# Patient Record
Sex: MALE | Race: WHITE | Employment: OTHER | URBAN - METROPOLITAN AREA
[De-identification: names, ages, dates, MRNs, and addresses within clinical notes are randomized per-mention and may not be internally consistent; named-entity substitution may affect disease eponyms.]

---

## 2021-05-19 ENCOUNTER — OFFICE VISIT (OUTPATIENT)
Dept: ORTHOPEDIC SURGERY | Age: 76
End: 2021-05-19
Payer: MEDICARE

## 2021-05-19 ENCOUNTER — TELEPHONE (OUTPATIENT)
Dept: ORTHOPEDIC SURGERY | Age: 76
End: 2021-05-19

## 2021-05-19 VITALS — WEIGHT: 240 LBS | HEIGHT: 76 IN | BODY MASS INDEX: 29.22 KG/M2

## 2021-05-19 DIAGNOSIS — Z98.890 HISTORY OF LEFT KNEE SURGERY: ICD-10-CM

## 2021-05-19 DIAGNOSIS — M17.32 POST-TRAUMATIC ARTHRITIS OF LEFT LOWER LEG: Primary | ICD-10-CM

## 2021-05-19 PROCEDURE — 99203 OFFICE O/P NEW LOW 30 MIN: CPT | Performed by: ORTHOPAEDIC SURGERY

## 2021-05-19 RX ORDER — MELOXICAM 7.5 MG/1
7.5 TABLET ORAL DAILY
Qty: 30 TABLET | Refills: 1 | Status: SHIPPED | OUTPATIENT
Start: 2021-05-19 | End: 2021-07-12

## 2021-05-19 NOTE — PROGRESS NOTES
CHIEF COMPLAINT: Left knee pain/ posttraumatic osteoarthritis. HISTORY:  Mr. Leandro Armstrong 70 y.o.  male presents today for the first visit for evaluation of left knee pain which started to worsen few months ago and worsening over time gradually. He is complaining of achy pain. Pain is increase with standing and walking and decrease with rest. Pain is sharp early in the morning with first few steps, dull achy pain by the end of the day. Alleviating factors: rest. No radiation and no numbness and tingling sensation. No other complaint. 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     Past Medical History:   Diagnosis Date    Anxiety     CAD (coronary artery disease)     Depression     GERD (gastroesophageal reflux disease)     Hyperlipidemia     Hypertension     Thyroid disease     Wears glasses        Past Surgical History:   Procedure Laterality Date    CARDIAC SURGERY      stent    CHOLECYSTECTOMY  18 years ago    FRACTURE SURGERY Left     KNEE ARTHROSCOPY Right     SHOULDER ARTHROSCOPY Left 15 years ago    TIBIA FRACTURE SURGERY Left 1/26/15    ORIF left tibia nonunion fracture       Social History     Socioeconomic History    Marital status:      Spouse name: Not on file    Number of children: Not on file    Years of education: Not on file    Highest education level: Not on file   Occupational History    Occupation: ret   Tobacco Use    Smoking status: Former Smoker    Smokeless tobacco: Never Used    Tobacco comment: quit:  14 years ago   Substance and Sexual Activity    Alcohol use: Yes     Comment: rare    Drug use: No    Sexual activity: Yes     Partners: Female   Other Topics Concern    Not on file   Social History Narrative    Not on file     Social Determinants of Health     Financial Resource Strain:     Difficulty of Paying Living Expenses:    Food Insecurity:     Worried About Running Out of Food in the Last Year:     Ran Out of Food in the Last Year:    Transportation Needs:     Lack of Transportation (Medical):  Lack of Transportation (Non-Medical):    Physical Activity:     Days of Exercise per Week:     Minutes of Exercise per Session:    Stress:     Feeling of Stress :    Social Connections:     Frequency of Communication with Friends and Family:     Frequency of Social Gatherings with Friends and Family:     Attends Jewish Services:     Active Member of Clubs or Organizations:     Attends Club or Organization Meetings:     Marital Status:    Intimate Partner Violence:     Fear of Current or Ex-Partner:     Emotionally Abused:     Physically Abused:     Sexually Abused:        Family History   Problem Relation Age of Onset    Cancer Mother     Other Father         MVA       Current Outpatient Medications on File Prior to Visit   Medication Sig Dispense Refill    vitamin D (CHOLECALCIFEROL) 1000 UNIT TABS tablet Take 1,000 Units by mouth daily.  LORazepam (ATIVAN) 1 MG tablet Take 0.5 mg by mouth 2 times daily.  buPROPion SR (WELLBUTRIN SR) 150 MG SR tablet Take 150 mg by mouth 2 times daily.  simvastatin (ZOCOR) 40 MG tablet Take 20 mg by mouth nightly.  lisinopril (PRINIVIL;ZESTRIL) 20 MG tablet Take 10 mg by mouth daily.  omeprazole (PRILOSEC) 20 MG capsule Take 20 mg by mouth nightly.  levothyroxine (SYNTHROID) 125 MCG tablet Take 125 mcg by mouth Daily.  busPIRone (BUSPAR) 10 MG tablet Take 15 mg by mouth 2 times daily.  (Patient not taking: Reported on 5/19/2021)       Current Facility-Administered Medications on File Prior to Visit   Medication Dose Route Frequency Provider Last Rate Last Admin    oxyCODONE-acetaminophen (PERCOCET) 5-325 MG per tablet 1 tablet  1 tablet Oral Q4H PRN Nathan Reyes MD           Pertinent items are noted in HPI  Review of systems reviewed from Patient History Form dated on 5/19/2021 and available in the patient's chart under the Media tab. PHYSICAL EXAMINATION:  Mr. Dequan Hilliard is a very pleasant 70 y.o.  male who presents today in no acute distress, awake, alert, and oriented. He is well dressed, nourished and  groomed. Patient with normal affect. Height is  6' 4\" (1.93 m), weight is 240 lb (108.9 kg), Body mass index is 29.21 kg/m². Resting respiratory rate is 16. Examination of the gait, showed that the patient walks heel-toe with a non-antalgic gait and no limp. Examination of both knees showing full ROM, left moderate crepitus, tenderness on medial joint line, stable to varus and valgus stress. He has intact sensation and good pedal pulses. He has good strength in 2 planes, and has mild tenderness on deep palpation over the medial joint line. Knee reflex 1+ bilaterally. There is a large scar on the left lateral knee that is intact. IMAGING:  Xray 3 views of the left knee was obtained today in the office and reviewed. These demonstrate severe degenerative changes with narrowing of the joint space in the medial joint space compartment, subchondral sclerosis, and marginal osteophytosis. There are 2 plates in the tibial fracture. The lateral plate appears to have a crack in the plate. IMPRESSION: Left knee posttraumatic DJD. PLAN: I discussed with the patient the treatment options including both surgical and non-surgical treatment. We recommended Quad exercises and stretching of the calf and hamstrings which was taught to the patient today. He will take NSAIDS as needed intermittently. F/u in 3-4 months, PT if needed. He understands that this may need surgery if the pain did not to resolve.        Juwan Thomas MD

## 2021-05-19 NOTE — TELEPHONE ENCOUNTER
Patient was notified that we will send in meloxicam. Patient states he does not have a reaction to that

## 2021-05-19 NOTE — TELEPHONE ENCOUNTER
General Question     Subject: PATIENT CALLING BACK, CONCERNING PRESCRIPTION  Patient and /or Facility Request: PATIENT  Contact Number: [phone]

## 2021-05-19 NOTE — TELEPHONE ENCOUNTER
Called and left  for patient asking about naproxen. Patient chart states that he is unable to take this due to reacting with hives.

## 2021-05-24 PROBLEM — M17.32 POST-TRAUMATIC ARTHRITIS OF LEFT LOWER LEG: Status: ACTIVE | Noted: 2021-05-24

## 2021-07-12 RX ORDER — MELOXICAM 7.5 MG/1
TABLET ORAL
Qty: 30 TABLET | Refills: 1 | Status: SHIPPED | OUTPATIENT
Start: 2021-07-12 | End: 2022-08-05

## 2022-08-05 ENCOUNTER — OFFICE VISIT (OUTPATIENT)
Dept: ORTHOPEDIC SURGERY | Age: 77
End: 2022-08-05
Payer: MEDICARE

## 2022-08-05 VITALS — BODY MASS INDEX: 29.22 KG/M2 | HEIGHT: 76 IN | WEIGHT: 240 LBS

## 2022-08-05 DIAGNOSIS — M17.32 POST-TRAUMATIC ARTHRITIS OF LEFT LOWER LEG: Primary | ICD-10-CM

## 2022-08-05 PROCEDURE — 99213 OFFICE O/P EST LOW 20 MIN: CPT | Performed by: ORTHOPAEDIC SURGERY

## 2022-08-05 PROCEDURE — 1123F ACP DISCUSS/DSCN MKR DOCD: CPT | Performed by: ORTHOPAEDIC SURGERY

## 2022-08-05 RX ORDER — MELOXICAM 7.5 MG/1
7.5 TABLET ORAL DAILY
Qty: 30 TABLET | Refills: 0 | Status: SHIPPED | OUTPATIENT
Start: 2022-08-05 | End: 2022-09-04

## 2022-08-15 NOTE — PROGRESS NOTES
CHIEF COMPLAINT: Left knee pain/ posttraumatic osteoarthritis. HISTORY:  Mr. Sandhya Reed 78 y.o.  male presents today for f/u evaluation of left knee pain which started to worsen few months ago and worsening over time gradually. He is complaining of achy pain. Pain is increase with standing and walking and decrease with rest. Pain is sharp early in the morning with first few steps, dull achy pain by the end of the day. Alleviating factors: rest. No radiation and no numbness and tingling sensation. No other complaint. 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     Past Medical History:   Diagnosis Date    Anxiety     CAD (coronary artery disease)     Depression     GERD (gastroesophageal reflux disease)     Hyperlipidemia     Hypertension     Thyroid disease     Wears glasses        Past Surgical History:   Procedure Laterality Date    CARDIAC SURGERY      stent    CHOLECYSTECTOMY  18 years ago    FRACTURE SURGERY Left     KNEE ARTHROSCOPY Right     SHOULDER ARTHROSCOPY Left 15 years ago    TIBIA FRACTURE SURGERY Left 1/26/15    ORIF left tibia nonunion fracture       Social History     Socioeconomic History    Marital status:      Spouse name: Not on file    Number of children: Not on file    Years of education: Not on file    Highest education level: Not on file   Occupational History    Occupation: ret   Tobacco Use    Smoking status: Former    Smokeless tobacco: Never    Tobacco comments:     quit:  14 years ago   Substance and Sexual Activity    Alcohol use: Yes     Comment: rare    Drug use: No    Sexual activity: Yes     Partners: Female   Other Topics Concern    Not on file   Social History Narrative    Not on file     Social Determinants of Health     Financial Resource Strain: Not on file   Food Insecurity: Not on file   Transportation Needs: Not on file   Physical Activity: Not on file   Stress: Not on file   Social Connections: Not on file   Intimate Partner Violence: Not on file   Housing Stability: Not on file       Family History   Problem Relation Age of Onset    Cancer Mother     Other Father         MVA       Current Outpatient Medications on File Prior to Visit   Medication Sig Dispense Refill    vitamin D (CHOLECALCIFEROL) 1000 UNIT TABS tablet Take 1,000 Units by mouth daily. LORazepam (ATIVAN) 1 MG tablet Take 0.5 mg by mouth 2 times daily. buPROPion SR (WELLBUTRIN SR) 150 MG SR tablet Take 150 mg by mouth 2 times daily. simvastatin (ZOCOR) 40 MG tablet Take 20 mg by mouth nightly. busPIRone (BUSPAR) 10 MG tablet Take 15 mg by mouth 2 times daily. (Patient not taking: Reported on 5/19/2021)      omeprazole (PRILOSEC) 20 MG capsule Take 20 mg by mouth nightly. levothyroxine (SYNTHROID) 125 MCG tablet Take 125 mcg by mouth Daily. Current Facility-Administered Medications on File Prior to Visit   Medication Dose Route Frequency Provider Last Rate Last Admin    oxyCODONE-acetaminophen (PERCOCET) 5-325 MG per tablet 1 tablet  1 tablet Oral Q4H PRN Nasima Mejia MD           Pertinent items are noted in HPI  Review of systems reviewed from Patient History Form and available in the patient's chart under the Media tab. PHYSICAL EXAMINATION:  Mr. Lila Melendrez is a very pleasant 78 y.o.  male who presents today in no acute distress, awake, alert, and oriented. He is well dressed, nourished and  groomed. Patient with normal affect. Height is  6' 4\" (1.93 m), weight is 240 lb (108.9 kg), Body mass index is 29.21 kg/m². Resting respiratory rate is 16. Examination of the gait, showed that the patient walks heel-toe with a non-antalgic gait and no limp. Examination of both knees showing full ROM, left moderate crepitus, tenderness on medial joint line, stable to varus and valgus stress. He has intact sensation and good pedal pulses.  He has good strength in 2 planes, and has mild tenderness on deep palpation over the medial joint line. Knee reflex 1+ bilaterally. There is a large scar on the left lateral knee that is intact. IMAGING:  Xray 3 views of the left knee was obtained 5/19/2021 in the office and reviewed. These demonstrate severe degenerative changes with narrowing of the joint space in the medial joint space compartment, subchondral sclerosis, and marginal osteophytosis. There are 2 plates in the tibial fracture. The lateral plate appears to have a crack in the plate. IMPRESSION: Left knee posttraumatic DJD. PLAN: I discussed with the patient the treatment options including both surgical and non-surgical treatment. We recommended Quad exercises and stretching of the calf and hamstrings which was taught to the patient today. He will take NSAIDS as needed intermittently. F/u in 3-4 months, PT if needed. He understands that this may need surgery if the pain did not to resolve.  He would like to try Synvisc injectio left knee and we will send for approval.      Ramesh Holt MD

## 2022-08-25 ENCOUNTER — TELEPHONE (OUTPATIENT)
Dept: ORTHOPEDIC SURGERY | Age: 77
End: 2022-08-25

## 2022-08-25 NOTE — TELEPHONE ENCOUNTER
General Question     Subject: Patient call and would like to know what his co pay is going to be he stated it will be 20% he just need to know how much that will be for his visits for his gel injection shots. Please Advise.   Patient Valaria Day  Contact Number: [phone]

## 2022-08-25 NOTE — TELEPHONE ENCOUNTER
Delmis was contacted in regards to his question on his co-pay. He was given the medication and Injection Patient Self-Pay Charge at Time of Service for the following:    Durolane $1375.60  Supartz $218.2  Euflexxa $279.4  Gelsyn 3 $491.2    For the a series 3 injection (Euflexxa and Gelsyn) the patient would pay his normal co-pay for the first office visit and then no co-pay for the additional two. Staff is unsure what the 20% is referrenced to by his insurance. Patient will call insurance and ask if the medication is to be billed at 20% or the injection charge is to be billed at 20%? *ADDENDED: 20% then there is no up front charge   Patient will have to wait until they get the bill from there insurance company. Patient notified.

## 2022-09-07 ENCOUNTER — OFFICE VISIT (OUTPATIENT)
Dept: ORTHOPEDIC SURGERY | Age: 77
End: 2022-09-07
Payer: MEDICARE

## 2022-09-07 VITALS — WEIGHT: 255 LBS | HEIGHT: 76 IN | BODY MASS INDEX: 31.05 KG/M2

## 2022-09-07 DIAGNOSIS — M17.32 POST-TRAUMATIC ARTHRITIS OF LEFT LOWER LEG: Primary | ICD-10-CM

## 2022-09-07 PROCEDURE — 20610 DRAIN/INJ JOINT/BURSA W/O US: CPT | Performed by: ORTHOPAEDIC SURGERY

## 2022-09-07 RX ORDER — HYALURONATE SODIUM 10 MG/ML
20 SYRINGE (ML) INTRAARTICULAR ONCE
Status: COMPLETED | OUTPATIENT
Start: 2022-09-07 | End: 2022-09-07

## 2022-09-07 RX ADMIN — Medication 20 MG: at 11:18

## 2022-09-07 NOTE — PROGRESS NOTES
CHIEF COMPLAINT: Left knee pain/ posttraumatic osteoarthritis. HISTORY:  Mr. Kirby Setting 78 y.o.  male presents today for f/u evaluation of left knee pain which started to worsen few months ago and worsening over time gradually. He is complaining of achy pain 4/10. Pain is increase with standing and walking and decrease with rest. Pain is sharp early in the morning with first few steps, dull achy pain by the end of the day. Alleviating factors: rest. No radiation and no numbness and tingling sensation. No other complaint. 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     Past Medical History:   Diagnosis Date    Anxiety     CAD (coronary artery disease)     Depression     GERD (gastroesophageal reflux disease)     Hyperlipidemia     Hypertension     Thyroid disease     Wears glasses        Past Surgical History:   Procedure Laterality Date    CARDIAC SURGERY      stent    CHOLECYSTECTOMY  18 years ago    FRACTURE SURGERY Left     KNEE ARTHROSCOPY Right     SHOULDER ARTHROSCOPY Left 15 years ago    TIBIA FRACTURE SURGERY Left 1/26/15    ORIF left tibia nonunion fracture       Social History     Socioeconomic History    Marital status:      Spouse name: Not on file    Number of children: Not on file    Years of education: Not on file    Highest education level: Not on file   Occupational History    Occupation: ret   Tobacco Use    Smoking status: Former    Smokeless tobacco: Never    Tobacco comments:     quit:  14 years ago   Substance and Sexual Activity    Alcohol use: Yes     Comment: rare    Drug use: No    Sexual activity: Yes     Partners: Female   Other Topics Concern    Not on file   Social History Narrative    Not on file     Social Determinants of Health     Financial Resource Strain: Not on file   Food Insecurity: Not on file   Transportation Needs: Not on file   Physical Activity: Not on file   Stress: Not on file   Social Connections: Not on file   Intimate Partner Violence: Not on file   Housing Stability: Not on file       Family History   Problem Relation Age of Onset    Cancer Mother     Other Father         MVA       Current Outpatient Medications on File Prior to Visit   Medication Sig Dispense Refill    meloxicam (MOBIC) 7.5 MG tablet Take 1 tablet by mouth in the morning. 30 tablet 0    vitamin D (CHOLECALCIFEROL) 1000 UNIT TABS tablet Take 1,000 Units by mouth daily. LORazepam (ATIVAN) 1 MG tablet Take 0.5 mg by mouth 2 times daily. buPROPion SR (WELLBUTRIN SR) 150 MG SR tablet Take 150 mg by mouth 2 times daily. simvastatin (ZOCOR) 40 MG tablet Take 20 mg by mouth nightly. busPIRone (BUSPAR) 10 MG tablet Take 15 mg by mouth 2 times daily. (Patient not taking: Reported on 5/19/2021)      omeprazole (PRILOSEC) 20 MG capsule Take 20 mg by mouth nightly. levothyroxine (SYNTHROID) 125 MCG tablet Take 125 mcg by mouth Daily. Current Facility-Administered Medications on File Prior to Visit   Medication Dose Route Frequency Provider Last Rate Last Admin    oxyCODONE-acetaminophen (PERCOCET) 5-325 MG per tablet 1 tablet  1 tablet Oral Q4H PRN Kimberley Grubbs MD           Pertinent items are noted in HPI  Review of systems reviewed from Patient History Form and available in the patient's chart under the Media tab. PHYSICAL EXAMINATION:  Mr. Elvia Bower is a very pleasant 78 y.o.  male who presents today in no acute distress, awake, alert, and oriented. He is well dressed, nourished and  groomed. Patient with normal affect. Height is  6' 4\" (1.93 m), weight is 255 lb (115.7 kg), Body mass index is 31.04 kg/m². Resting respiratory rate is 16. Examination of the gait, showed that the patient walks heel-toe with a non-antalgic gait and no limp. Examination of both knees showing full ROM, left moderate crepitus, tenderness on medial joint line, stable to varus and valgus stress.   He has intact sensation and good pedal pulses. He has good strength in 2 planes, and has mild tenderness on deep palpation over the medial joint line. Knee reflex 1+ bilaterally. There is a large scar on the left lateral knee that is intact. IMAGING:  Xray 3 views of the left knee was obtained 5/19/2021 in the office and reviewed. These demonstrate severe degenerative changes with narrowing of the joint space in the medial joint space compartment, subchondral sclerosis, and marginal osteophytosis. There are 2 plates in the tibial fracture. The lateral plate appears to have a crack in the plate. IMPRESSION: Left knee posttraumatic DJD. PLAN: I discussed with the patient the treatment options including both surgical and non-surgical treatment. We recommended continue Quad exercises and stretching of the calf and hamstrings which was taught to the patient today. He will take NSAIDS as needed intermittently. He understands that this may need surgery if the pain did not to resolve. He would like to try Euflexxa injection left knee. PROCEDURE:    With the patient's permission, and under sterile condition, the left knee was prepared and draped with alcohol and injected with 1st Euflexxa through the medial parapatellar port area. The patient tolerated the procedure well. A band-aid was applied and the patient was advised to ice the knee for 15-20 minutes to relieve any injection site related pain. He reported a good improvement immediatly after the injection. F/U next week for 2nd Euflexxa injection.       John Martinez MD

## 2022-09-08 ENCOUNTER — TELEPHONE (OUTPATIENT)
Dept: ORTHOPEDIC SURGERY | Age: 77
End: 2022-09-08

## 2022-09-08 NOTE — TELEPHONE ENCOUNTER
Patient reports some tightness in calf muscle. No Discoloration in skin tone. No pain with squeezing or touch. No redness at injection site. Patient advised that it is common to experience soreness in the muscle group that surrounds the affected joint. Given instruction to stretch with heat to relieve tightness in his calf.

## 2022-09-14 ENCOUNTER — OFFICE VISIT (OUTPATIENT)
Dept: ORTHOPEDIC SURGERY | Age: 77
End: 2022-09-14
Payer: MEDICARE

## 2022-09-14 VITALS — BODY MASS INDEX: 31.05 KG/M2 | HEIGHT: 76 IN | WEIGHT: 255 LBS

## 2022-09-14 DIAGNOSIS — Z98.890 HISTORY OF LEFT KNEE SURGERY: ICD-10-CM

## 2022-09-14 DIAGNOSIS — M17.32 POST-TRAUMATIC ARTHRITIS OF LEFT LOWER LEG: Primary | ICD-10-CM

## 2022-09-14 PROCEDURE — 20610 DRAIN/INJ JOINT/BURSA W/O US: CPT | Performed by: ORTHOPAEDIC SURGERY

## 2022-09-14 RX ORDER — HYALURONATE SODIUM 10 MG/ML
20 SYRINGE (ML) INTRAARTICULAR ONCE
Status: COMPLETED | OUTPATIENT
Start: 2022-09-14 | End: 2022-09-14

## 2022-09-14 RX ADMIN — Medication 20 MG: at 08:30

## 2022-09-18 NOTE — PROGRESS NOTES
CHIEF COMPLAINT: Left knee pain/ posttraumatic osteoarthritis. HISTORY:  Mr. Tigre Gipson 78 y.o.  male presents today for f/u evaluation of left knee pain which started to worsen few months ago and worsening over time gradually. He is complaining of achy pain 4/10. Pain is increase with standing and walking and decrease with rest. Pain is sharp early in the morning with first few steps, dull achy pain by the end of the day. Alleviating factors: rest. No radiation and no numbness and tingling sensation. No other complaint. 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     Past Medical History:   Diagnosis Date    Anxiety     CAD (coronary artery disease)     Depression     GERD (gastroesophageal reflux disease)     Hyperlipidemia     Hypertension     Thyroid disease     Wears glasses        Past Surgical History:   Procedure Laterality Date    CARDIAC SURGERY      stent    CHOLECYSTECTOMY  18 years ago    FRACTURE SURGERY Left     KNEE ARTHROSCOPY Right     SHOULDER ARTHROSCOPY Left 15 years ago    TIBIA FRACTURE SURGERY Left 1/26/15    ORIF left tibia nonunion fracture       Social History     Socioeconomic History    Marital status:      Spouse name: Not on file    Number of children: Not on file    Years of education: Not on file    Highest education level: Not on file   Occupational History    Occupation: ret   Tobacco Use    Smoking status: Former    Smokeless tobacco: Never    Tobacco comments:     quit:  14 years ago   Substance and Sexual Activity    Alcohol use: Yes     Comment: rare    Drug use: No    Sexual activity: Yes     Partners: Female   Other Topics Concern    Not on file   Social History Narrative    Not on file     Social Determinants of Health     Financial Resource Strain: Not on file   Food Insecurity: Not on file   Transportation Needs: Not on file   Physical Activity: Not on file   Stress: Not on file   Social Connections: Not on file   Intimate Partner Violence: Not on file   Housing Stability: Not on file       Family History   Problem Relation Age of Onset    Cancer Mother     Other Father         MVA       Current Outpatient Medications on File Prior to Visit   Medication Sig Dispense Refill    meloxicam (MOBIC) 7.5 MG tablet Take 1 tablet by mouth in the morning. 30 tablet 0    vitamin D (CHOLECALCIFEROL) 1000 UNIT TABS tablet Take 1,000 Units by mouth daily. LORazepam (ATIVAN) 1 MG tablet Take 0.5 mg by mouth 2 times daily. buPROPion SR (WELLBUTRIN SR) 150 MG SR tablet Take 150 mg by mouth 2 times daily. simvastatin (ZOCOR) 40 MG tablet Take 20 mg by mouth nightly. busPIRone (BUSPAR) 10 MG tablet Take 15 mg by mouth 2 times daily. (Patient not taking: Reported on 5/19/2021)      omeprazole (PRILOSEC) 20 MG capsule Take 20 mg by mouth nightly. levothyroxine (SYNTHROID) 125 MCG tablet Take 125 mcg by mouth Daily. Current Facility-Administered Medications on File Prior to Visit   Medication Dose Route Frequency Provider Last Rate Last Admin    oxyCODONE-acetaminophen (PERCOCET) 5-325 MG per tablet 1 tablet  1 tablet Oral Q4H PRN Cleve Stafford MD           Pertinent items are noted in HPI  Review of systems reviewed from Patient History Form and available in the patient's chart under the Media tab. PHYSICAL EXAMINATION:  Mr. Parmjit Kovacs is a very pleasant 78 y.o.  male who presents today in no acute distress, awake, alert, and oriented. He is well dressed, nourished and  groomed. Patient with normal affect. Height is  6' 4\" (1.93 m), weight is 255 lb (115.7 kg), Body mass index is 31.04 kg/m². Resting respiratory rate is 16. Examination of the gait, showed that the patient walks heel-toe with a non-antalgic gait and no limp. Examination of both knees showing full ROM, left moderate crepitus, tenderness on medial joint line, stable to varus and valgus stress.   He has intact

## 2022-09-21 ENCOUNTER — OFFICE VISIT (OUTPATIENT)
Dept: ORTHOPEDIC SURGERY | Age: 77
End: 2022-09-21
Payer: MEDICARE

## 2022-09-21 VITALS — WEIGHT: 255 LBS | HEIGHT: 76 IN | BODY MASS INDEX: 31.05 KG/M2

## 2022-09-21 DIAGNOSIS — M17.32 POST-TRAUMATIC ARTHRITIS OF LEFT LOWER LEG: Primary | ICD-10-CM

## 2022-09-21 PROCEDURE — 20610 DRAIN/INJ JOINT/BURSA W/O US: CPT | Performed by: ORTHOPAEDIC SURGERY

## 2022-09-21 RX ORDER — HYALURONATE SODIUM 10 MG/ML
20 SYRINGE (ML) INTRAARTICULAR ONCE
Status: COMPLETED | OUTPATIENT
Start: 2022-09-21 | End: 2022-09-21

## 2022-09-21 RX ADMIN — Medication 20 MG: at 09:01

## 2022-10-03 NOTE — PROGRESS NOTES
CHIEF COMPLAINT: Left knee pain/ posttraumatic osteoarthritis. HISTORY:  Mr. Vanda Gaona 78 y.o.  male presents today for f/u evaluation of left knee pain which started to worsen few months ago and worsening over time gradually. He is complaining of achy pain 5/10. Pain is increase with standing and walking and decrease with rest. Pain is sharp early in the morning with first few steps, dull achy pain by the end of the day. Alleviating factors: rest. No radiation and no numbness and tingling sensation. No other complaint. 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     Past Medical History:   Diagnosis Date    Anxiety     CAD (coronary artery disease)     Depression     GERD (gastroesophageal reflux disease)     Hyperlipidemia     Hypertension     Thyroid disease     Wears glasses        Past Surgical History:   Procedure Laterality Date    CARDIAC SURGERY      stent    CHOLECYSTECTOMY  18 years ago    FRACTURE SURGERY Left     KNEE ARTHROSCOPY Right     SHOULDER ARTHROSCOPY Left 15 years ago    TIBIA FRACTURE SURGERY Left 1/26/15    ORIF left tibia nonunion fracture       Social History     Socioeconomic History    Marital status:      Spouse name: Not on file    Number of children: Not on file    Years of education: Not on file    Highest education level: Not on file   Occupational History    Occupation: ret   Tobacco Use    Smoking status: Former    Smokeless tobacco: Never    Tobacco comments:     quit:  14 years ago   Substance and Sexual Activity    Alcohol use: Yes     Comment: rare    Drug use: No    Sexual activity: Yes     Partners: Female   Other Topics Concern    Not on file   Social History Narrative    Not on file     Social Determinants of Health     Financial Resource Strain: Not on file   Food Insecurity: Not on file   Transportation Needs: Not on file   Physical Activity: Not on file   Stress: Not on file   Social Connections: Not on file   Intimate Partner Violence: Not on file   Housing Stability: Not on file       Family History   Problem Relation Age of Onset    Cancer Mother     Other Father         MVA       Current Outpatient Medications on File Prior to Visit   Medication Sig Dispense Refill    meloxicam (MOBIC) 7.5 MG tablet Take 1 tablet by mouth in the morning. 30 tablet 0    vitamin D (CHOLECALCIFEROL) 1000 UNIT TABS tablet Take 1,000 Units by mouth daily. LORazepam (ATIVAN) 1 MG tablet Take 0.5 mg by mouth 2 times daily. buPROPion SR (WELLBUTRIN SR) 150 MG SR tablet Take 150 mg by mouth 2 times daily. simvastatin (ZOCOR) 40 MG tablet Take 20 mg by mouth nightly. busPIRone (BUSPAR) 10 MG tablet Take 15 mg by mouth 2 times daily. (Patient not taking: Reported on 5/19/2021)      omeprazole (PRILOSEC) 20 MG capsule Take 20 mg by mouth nightly. levothyroxine (SYNTHROID) 125 MCG tablet Take 125 mcg by mouth Daily. Current Facility-Administered Medications on File Prior to Visit   Medication Dose Route Frequency Provider Last Rate Last Admin    oxyCODONE-acetaminophen (PERCOCET) 5-325 MG per tablet 1 tablet  1 tablet Oral Q4H PRN Benito Reis MD           Pertinent items are noted in HPI  Review of systems reviewed from Patient History Form and available in the patient's chart under the Media tab. PHYSICAL EXAMINATION:  Mr. Tye Issa is a very pleasant 78 y.o.  male who presents today in no acute distress, awake, alert, and oriented. He is well dressed, nourished and  groomed. Patient with normal affect. Height is  6' 4\" (1.93 m), weight is 255 lb (115.7 kg), Body mass index is 31.04 kg/m². Resting respiratory rate is 16. Examination of the gait, showed that the patient walks heel-toe with a non-antalgic gait and no limp. Examination of both knees showing full ROM, left moderate crepitus, tenderness on medial joint line, stable to varus and valgus stress.   He has intact sensation and good pedal pulses. He has good strength in 2 planes, and has mild tenderness on deep palpation over the medial joint line. Knee reflex 1+ bilaterally. There is a large scar on the left lateral knee that is intact. IMAGING:  Xray 3 views of the left knee was obtained 5/19/2021 in the office and reviewed. These demonstrate severe degenerative changes with narrowing of the joint space in the medial joint space compartment, subchondral sclerosis, and marginal osteophytosis. There are 2 plates in the tibial fracture. The lateral plate appears to have a crack in the plate. IMPRESSION: Left knee posttraumatic DJD. PLAN: I discussed with the patient the treatment options including both surgical and non-surgical treatment. We recommended continue Quad exercises and stretching of the calf and hamstrings which was taught to the patient today. He will take NSAIDS as needed intermittently. He understands that this may need surgery if the pain did not to resolve. He would like to try Euflexxa injection left knee. PROCEDURE:    With the patient's permission, and under sterile condition, the left knee was prepared and draped with alcohol and injected with 3rd Euflexxa through the medial parapatellar port area. The patient tolerated the procedure well. A band-aid was applied and the patient was advised to ice the knee for 15-20 minutes to relieve any injection site related pain. He reported a good improvement immediatly after the injection. F/U in 3-4 months.       Kim Martinez MD

## 2023-03-01 ENCOUNTER — TELEPHONE (OUTPATIENT)
Dept: ORTHOPEDIC SURGERY | Age: 78
End: 2023-03-01

## 2023-03-08 ENCOUNTER — OFFICE VISIT (OUTPATIENT)
Dept: ORTHOPEDIC SURGERY | Age: 78
End: 2023-03-08

## 2023-03-08 VITALS — BODY MASS INDEX: 31.05 KG/M2 | WEIGHT: 255 LBS | HEIGHT: 76 IN

## 2023-03-08 DIAGNOSIS — M17.32 POST-TRAUMATIC ARTHRITIS OF LEFT LOWER LEG: ICD-10-CM

## 2023-03-08 DIAGNOSIS — Z98.890 HISTORY OF LEFT KNEE SURGERY: ICD-10-CM

## 2023-03-08 DIAGNOSIS — S82.252K CLOSED DISPLACED COMMINUTED FRACTURE OF SHAFT OF LEFT TIBIA WITH NONUNION: ICD-10-CM

## 2023-03-08 DIAGNOSIS — M17.32 POST-TRAUMATIC OSTEOARTHRITIS OF LEFT KNEE: Primary | ICD-10-CM

## 2023-03-08 RX ORDER — LIDOCAINE HYDROCHLORIDE 10 MG/ML
40 INJECTION, SOLUTION INFILTRATION; PERINEURAL ONCE
Status: COMPLETED | OUTPATIENT
Start: 2023-03-08 | End: 2023-03-08

## 2023-03-08 RX ORDER — TRIAMCINOLONE ACETONIDE 40 MG/ML
40 INJECTION, SUSPENSION INTRA-ARTICULAR; INTRAMUSCULAR ONCE
Status: COMPLETED | OUTPATIENT
Start: 2023-03-08 | End: 2023-03-08

## 2023-03-08 RX ADMIN — LIDOCAINE HYDROCHLORIDE 40 MG: 10 INJECTION, SOLUTION INFILTRATION; PERINEURAL at 10:29

## 2023-03-08 RX ADMIN — TRIAMCINOLONE ACETONIDE 40 MG: 40 INJECTION, SUSPENSION INTRA-ARTICULAR; INTRAMUSCULAR at 10:29

## 2023-03-08 NOTE — PROGRESS NOTES
CHIEF COMPLAINT:   1- Left knee pain/ posttraumatic osteoarthritis. 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 HISTORY:  Mr. Penny Louis 78 y.o.  male presents today for f/u evaluation of left knee pain which started to worsen summer 2022 and worsening over time gradually. He had left knee Eufflexxa injection on 9/21/2023 with good improvement. He is complaining of achy pain 6/10. Pain is increase with standing and walking and decrease with rest. Pain is sharp early in the morning with first few steps, dull achy pain by the end of the day. Alleviating factors: rest. No radiation and no numbness and tingling sensation. No other complaint. 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     Past Medical History:   Diagnosis Date    Anxiety     CAD (coronary artery disease)     Depression     GERD (gastroesophageal reflux disease)     Hyperlipidemia     Hypertension     Thyroid disease     Wears glasses        Past Surgical History:   Procedure Laterality Date    CARDIAC SURGERY      stent    CHOLECYSTECTOMY  18 years ago    FRACTURE SURGERY Left     KNEE ARTHROSCOPY Right     SHOULDER ARTHROSCOPY Left 15 years ago    TIBIA FRACTURE SURGERY Left 1/26/15    ORIF left tibia nonunion fracture       Social History     Socioeconomic History    Marital status:      Spouse name: Not on file    Number of children: Not on file    Years of education: Not on file    Highest education level: Not on file   Occupational History    Occupation: ret   Tobacco Use    Smoking status: Former    Smokeless tobacco: Never    Tobacco comments:     quit:  14 years ago   Substance and Sexual Activity    Alcohol use: Yes     Comment: rare    Drug use: No    Sexual activity: Yes     Partners: Female   Other Topics Concern    Not on file   Social History Narrative    Not on file     Social Determinants of Health     Financial Resource Strain: Not on file   Food Insecurity: Not on file   Transportation Needs: Not on file   Physical Activity: Not on file   Stress: Not on file   Social Connections: Not on file   Intimate Partner Violence: Not on file   Housing Stability: Not on file       Family History   Problem Relation Age of Onset    Cancer Mother     Other Father         MVA       Current Outpatient Medications on File Prior to Visit   Medication Sig Dispense Refill    meloxicam (MOBIC) 7.5 MG tablet Take 1 tablet by mouth in the morning. 30 tablet 0    vitamin D (CHOLECALCIFEROL) 1000 UNIT TABS tablet Take 1,000 Units by mouth daily. LORazepam (ATIVAN) 1 MG tablet Take 0.5 mg by mouth 2 times daily. buPROPion SR (WELLBUTRIN SR) 150 MG SR tablet Take 150 mg by mouth 2 times daily. simvastatin (ZOCOR) 40 MG tablet Take 20 mg by mouth nightly. busPIRone (BUSPAR) 10 MG tablet Take 15 mg by mouth 2 times daily. (Patient not taking: Reported on 5/19/2021)      omeprazole (PRILOSEC) 20 MG capsule Take 20 mg by mouth nightly. levothyroxine (SYNTHROID) 125 MCG tablet Take 125 mcg by mouth Daily. Current Facility-Administered Medications on File Prior to Visit   Medication Dose Route Frequency Provider Last Rate Last Admin    oxyCODONE-acetaminophen (PERCOCET) 5-325 MG per tablet 1 tablet  1 tablet Oral Q4H PRN Mary Dallas MD           Pertinent items are noted in HPI  Review of systems reviewed from Patient History Form and available in the patient's chart under the Media tab. PHYSICAL EXAMINATION:  Mr. Wilhelmenia Runner is a very pleasant 78 y.o.  male who presents today in no acute distress, awake, alert, and oriented. He is well dressed, nourished and  groomed. Patient with normal affect. Height is  6' 4\" (1.93 m), weight is 255 lb (115.7 kg), Body mass index is 31.04 kg/m². Resting respiratory rate is 16.      Examination of the gait, showed that the patient walks heel-toe with a non-antalgic gait and no limp.  Examination of both knees showing full ROM, left moderate crepitus, tenderness on medial joint line, stable to varus and valgus stress. He has intact sensation and good pedal pulses. He has good strength in 2 planes, and has mild tenderness on deep palpation over the medial joint line. Knee reflex 1+ bilaterally. There is a large scar on the left lateral knee that is intact. No tenderness over the tibia fracture nonunion site. IMAGING:  Xray 3 views of the left knee was obtained today in the office and reviewed. These demonstrate severe degenerative changes with narrowing of the joint space in the medial joint space compartment, subchondral sclerosis, and marginal osteophytosis. There are 2 plates in the tibial fracture. The anterior plate has a crack in the plate but fracture healed. IMPRESSION: Left knee posttraumatic DJD. 1- Left knee pain/ posttraumatic osteoarthritis. 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 PLAN: I discussed with the patient the treatment options including both surgical and non-surgical treatment. We recommended continue Quad exercises and stretching of the calf and hamstrings which was taught to the patient today. He will take NSAIDS as needed intermittently. He understands that this may need surgery if the pain did not to resolve. I believe he will benefit from cortisone injection left knee, and he agreed to have. PROCEDURE:    With the patient's permission, and under sterile condition, the left knee was prepared and draped with alcohol and injected with a mixture of 1 mL Kenalog 40mg and 4 mL of 1% lidocaine through the medial parapatellar port area. The patient tolerated the procedure well. A band-aid was applied and the patient was advised to ice the knee for 15-20 minutes to relieve any injection site related pain. He reported a good improvement immediatly after the injection.  We will sent for Eufflexxa approval.    F/U in 3-4 months.       Lina Morgan MD

## 2023-03-14 DIAGNOSIS — M17.12 PRIMARY OSTEOARTHRITIS OF LEFT KNEE: Primary | ICD-10-CM

## 2023-03-20 ENCOUNTER — TELEPHONE (OUTPATIENT)
Dept: ORTHOPEDIC SURGERY | Age: 78
End: 2023-03-20

## 2023-03-20 NOTE — TELEPHONE ENCOUNTER
Called and left patient a voicemail message saying his Euflexxa injections are approved 3/22 - 5/21. Asked him to callback to schedule 3 appointments at his convenience.

## 2023-03-29 ENCOUNTER — OFFICE VISIT (OUTPATIENT)
Dept: ORTHOPEDIC SURGERY | Age: 78
End: 2023-03-29
Payer: MEDICARE

## 2023-03-29 VITALS — WEIGHT: 255 LBS | HEIGHT: 76 IN | BODY MASS INDEX: 31.05 KG/M2

## 2023-03-29 DIAGNOSIS — M17.32 POST-TRAUMATIC OSTEOARTHRITIS OF LEFT KNEE: Primary | ICD-10-CM

## 2023-03-29 DIAGNOSIS — S82.252K CLOSED DISPLACED COMMINUTED FRACTURE OF SHAFT OF LEFT TIBIA WITH NONUNION: ICD-10-CM

## 2023-03-29 PROCEDURE — 99213 OFFICE O/P EST LOW 20 MIN: CPT | Performed by: ORTHOPAEDIC SURGERY

## 2023-03-29 PROCEDURE — 20610 DRAIN/INJ JOINT/BURSA W/O US: CPT | Performed by: ORTHOPAEDIC SURGERY

## 2023-03-29 PROCEDURE — 1123F ACP DISCUSS/DSCN MKR DOCD: CPT | Performed by: ORTHOPAEDIC SURGERY

## 2023-03-29 RX ORDER — HYALURONATE SODIUM 10 MG/ML
20 SYRINGE (ML) INTRAARTICULAR ONCE
Status: COMPLETED | OUTPATIENT
Start: 2023-03-29 | End: 2023-03-29

## 2023-03-29 RX ADMIN — Medication 20 MG: at 09:26

## 2023-03-29 NOTE — PROGRESS NOTES
CHIEF COMPLAINT:   1- Left knee pain/ posttraumatic osteoarthritis. 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 HISTORY:  Mr. Vyas Nephew 78 y.o.  male presents today for f/u evaluation of left knee pain which started to worsen summer 2022 and worsening over time gradually. He had left knee cortisone injection on 3/8/2023 with good improvement. He had left knee Eufflexxa injection on 9/21/2023 with good improvement. He is complaining of achy pain 4/10. Pain is increase with standing and walking and decrease with rest. Pain is sharp early in the morning with first few steps, dull achy pain by the end of the day. Alleviating factors: rest. No radiation and no numbness and tingling sensation. No other complaint. 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     Past Medical History:   Diagnosis Date    Anxiety     CAD (coronary artery disease)     Depression     GERD (gastroesophageal reflux disease)     Hyperlipidemia     Hypertension     Thyroid disease     Wears glasses        Past Surgical History:   Procedure Laterality Date    CARDIAC SURGERY      stent    CHOLECYSTECTOMY  18 years ago    FRACTURE SURGERY Left     KNEE ARTHROSCOPY Right     SHOULDER ARTHROSCOPY Left 15 years ago    TIBIA FRACTURE SURGERY Left 1/26/15    ORIF left tibia nonunion fracture       Social History     Socioeconomic History    Marital status:      Spouse name: Not on file    Number of children: Not on file    Years of education: Not on file    Highest education level: Not on file   Occupational History    Occupation: ret   Tobacco Use    Smoking status: Former    Smokeless tobacco: Never    Tobacco comments:     quit:  14 years ago   Substance and Sexual Activity    Alcohol use: Yes     Comment: rare    Drug use: No    Sexual activity: Yes     Partners: Female   Other Topics Concern    Not on file   Social History Narrative    Not

## 2023-04-05 ENCOUNTER — OFFICE VISIT (OUTPATIENT)
Dept: ORTHOPEDIC SURGERY | Age: 78
End: 2023-04-05

## 2023-04-05 VITALS — WEIGHT: 255 LBS | BODY MASS INDEX: 31.05 KG/M2 | HEIGHT: 76 IN

## 2023-04-05 DIAGNOSIS — S82.252K CLOSED DISPLACED COMMINUTED FRACTURE OF SHAFT OF LEFT TIBIA WITH NONUNION: ICD-10-CM

## 2023-04-05 DIAGNOSIS — M17.32 POST-TRAUMATIC OSTEOARTHRITIS OF LEFT KNEE: Primary | ICD-10-CM

## 2023-04-05 RX ORDER — HYALURONATE SODIUM 10 MG/ML
20 SYRINGE (ML) INTRAARTICULAR ONCE
Status: COMPLETED | OUTPATIENT
Start: 2023-04-05 | End: 2023-04-05

## 2023-04-05 RX ADMIN — Medication 20 MG: at 09:40

## 2023-04-05 NOTE — PROGRESS NOTES
CHIEF COMPLAINT:   1- Left knee pain/ posttraumatic osteoarthritis. 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 HISTORY:  Mr. Norma Patel 78 y.o.  male presents today for f/u evaluation of left knee pain which started to worsen summer 2022 and worsening over time gradually. He had left knee cortisone injection on 3/8/2023 with good improvement. He had left knee Eufflexxa injection on 9/21/2023 with good improvement. He reported no to minimal achy pain 0/10. Pain is increase with standing and walking and decrease with rest. Pain is sharp early in the morning with first few steps, dull achy pain by the end of the day. Alleviating factors: rest. No radiation and no numbness and tingling sensation. No other complaint. 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     Past Medical History:   Diagnosis Date    Anxiety     CAD (coronary artery disease)     Depression     GERD (gastroesophageal reflux disease)     Hyperlipidemia     Hypertension     Thyroid disease     Wears glasses        Past Surgical History:   Procedure Laterality Date    CARDIAC SURGERY      stent    CHOLECYSTECTOMY  18 years ago    FRACTURE SURGERY Left     KNEE ARTHROSCOPY Right     SHOULDER ARTHROSCOPY Left 15 years ago    TIBIA FRACTURE SURGERY Left 1/26/15    ORIF left tibia nonunion fracture       Social History     Socioeconomic History    Marital status:      Spouse name: Not on file    Number of children: Not on file    Years of education: Not on file    Highest education level: Not on file   Occupational History    Occupation: ret   Tobacco Use    Smoking status: Former    Smokeless tobacco: Never    Tobacco comments:     quit:  14 years ago   Substance and Sexual Activity    Alcohol use: Yes     Comment: rare    Drug use: No    Sexual activity: Yes     Partners: Female   Other Topics Concern    Not on file   Social History Narrative

## 2023-07-02 SDOH — HEALTH STABILITY: PHYSICAL HEALTH: ON AVERAGE, HOW MANY DAYS PER WEEK DO YOU ENGAGE IN MODERATE TO STRENUOUS EXERCISE (LIKE A BRISK WALK)?: 0 DAYS

## 2023-07-05 ENCOUNTER — OFFICE VISIT (OUTPATIENT)
Dept: ORTHOPEDIC SURGERY | Age: 78
End: 2023-07-05
Payer: MEDICARE

## 2023-07-05 VITALS — HEIGHT: 76 IN | WEIGHT: 255 LBS | BODY MASS INDEX: 31.05 KG/M2

## 2023-07-05 DIAGNOSIS — M17.32 POST-TRAUMATIC OSTEOARTHRITIS OF LEFT KNEE: Primary | ICD-10-CM

## 2023-07-05 DIAGNOSIS — S82.252K CLOSED DISPLACED COMMINUTED FRACTURE OF SHAFT OF LEFT TIBIA WITH NONUNION: ICD-10-CM

## 2023-07-05 PROCEDURE — 20610 DRAIN/INJ JOINT/BURSA W/O US: CPT | Performed by: ORTHOPAEDIC SURGERY

## 2023-07-05 PROCEDURE — 99213 OFFICE O/P EST LOW 20 MIN: CPT | Performed by: ORTHOPAEDIC SURGERY

## 2023-07-05 PROCEDURE — 1123F ACP DISCUSS/DSCN MKR DOCD: CPT | Performed by: ORTHOPAEDIC SURGERY

## 2023-07-05 RX ORDER — TRIAMCINOLONE ACETONIDE 40 MG/ML
40 INJECTION, SUSPENSION INTRA-ARTICULAR; INTRAMUSCULAR ONCE
Status: COMPLETED | OUTPATIENT
Start: 2023-07-05 | End: 2023-07-05

## 2023-07-05 RX ORDER — LIDOCAINE HYDROCHLORIDE 10 MG/ML
40 INJECTION, SOLUTION INFILTRATION; PERINEURAL ONCE
Status: COMPLETED | OUTPATIENT
Start: 2023-07-05 | End: 2023-07-05

## 2023-07-05 RX ADMIN — TRIAMCINOLONE ACETONIDE 40 MG: 40 INJECTION, SUSPENSION INTRA-ARTICULAR; INTRAMUSCULAR at 15:16

## 2023-07-05 RX ADMIN — LIDOCAINE HYDROCHLORIDE 40 MG: 10 INJECTION, SOLUTION INFILTRATION; PERINEURAL at 15:15

## 2023-07-07 NOTE — PROGRESS NOTES
CHIEF COMPLAINT:   1- Left knee pain/ posttraumatic osteoarthritis. 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 HISTORY:  Mr. Nicole Toledo 78 y.o.  male presents today for f/u evaluation of left knee pain which started to worsen summer 2022 and worsening over time gradually. He had left knee cortisone injection on 4/12/2023 with good improvement. He had left knee Eufflexxa injection on 9/21/2022 with good improvement. He reported no to minimal achy pain 0/10. Pain is increase with standing and walking and decrease with rest. Pain is sharp early in the morning with first few steps, dull achy pain by the end of the day. Alleviating factors: rest. No radiation and no numbness and tingling sensation. No other complaint. 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     Past Medical History:   Diagnosis Date    Anxiety     CAD (coronary artery disease)     Depression     GERD (gastroesophageal reflux disease)     Hyperlipidemia     Hypertension     Thyroid disease     Wears glasses        Past Surgical History:   Procedure Laterality Date    CARDIAC SURGERY      stent    CHOLECYSTECTOMY  18 years ago    FRACTURE SURGERY Left     KNEE ARTHROSCOPY Right     SHOULDER ARTHROSCOPY Left 15 years ago    TIBIA FRACTURE SURGERY Left 1/26/15    ORIF left tibia nonunion fracture       Social History     Socioeconomic History    Marital status:      Spouse name: Not on file    Number of children: Not on file    Years of education: Not on file    Highest education level: Not on file   Occupational History    Occupation: ret   Tobacco Use    Smoking status: Former    Smokeless tobacco: Never    Tobacco comments:     quit:  14 years ago   Substance and Sexual Activity    Alcohol use: Yes     Comment: rare    Drug use: No    Sexual activity: Yes     Partners: Female   Other Topics Concern    Not on file   Social History Narrative

## 2023-11-22 ENCOUNTER — OFFICE VISIT (OUTPATIENT)
Dept: ORTHOPEDIC SURGERY | Age: 78
End: 2023-11-22

## 2023-11-22 VITALS — HEIGHT: 76 IN | BODY MASS INDEX: 31.05 KG/M2 | WEIGHT: 255 LBS

## 2023-11-22 DIAGNOSIS — M17.32 POST-TRAUMATIC OSTEOARTHRITIS OF LEFT KNEE: Primary | ICD-10-CM

## 2023-11-22 DIAGNOSIS — M17.32 POST-TRAUMATIC ARTHRITIS OF LEFT LOWER LEG: ICD-10-CM

## 2023-11-22 DIAGNOSIS — S82.252K CLOSED DISPLACED COMMINUTED FRACTURE OF SHAFT OF LEFT TIBIA WITH NONUNION: ICD-10-CM

## 2023-11-22 RX ORDER — TRIAMCINOLONE ACETONIDE 40 MG/ML
40 INJECTION, SUSPENSION INTRA-ARTICULAR; INTRAMUSCULAR ONCE
Status: COMPLETED | OUTPATIENT
Start: 2023-11-22 | End: 2023-11-22

## 2023-11-22 RX ORDER — LIDOCAINE HYDROCHLORIDE 10 MG/ML
4 INJECTION, SOLUTION INFILTRATION; PERINEURAL ONCE
Status: COMPLETED | OUTPATIENT
Start: 2023-11-22 | End: 2023-11-22

## 2023-11-22 RX ADMIN — LIDOCAINE HYDROCHLORIDE 4 ML: 10 INJECTION, SOLUTION INFILTRATION; PERINEURAL at 15:43

## 2023-11-22 RX ADMIN — TRIAMCINOLONE ACETONIDE 40 MG: 40 INJECTION, SUSPENSION INTRA-ARTICULAR; INTRAMUSCULAR at 15:44

## 2023-11-27 NOTE — PROGRESS NOTES
CHIEF COMPLAINT:   1- Left knee pain/ posttraumatic osteoarthritis. 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 HISTORY:  Mr. Rodolfo Howard 70 y.o.  male presents today for f/u evaluation of left knee pain which started to worsen summer 2022 and worsening over time gradually. He had left knee cortisone injection on 4/12/2023 with good improvement. He had left knee cortisone injection on 7/5/2023 with good improvement. He reported no to minimal achy pain 7/10. Pain is increase with standing and walking and decrease with rest. Pain is sharp early in the morning with first few steps, dull achy pain by the end of the day. Alleviating factors: rest. No radiation and no numbness and tingling sensation. No other complaint. 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     Past Medical History:   Diagnosis Date    Anxiety     CAD (coronary artery disease)     Depression     GERD (gastroesophageal reflux disease)     Hyperlipidemia     Hypertension     Thyroid disease     Wears glasses        Past Surgical History:   Procedure Laterality Date    CARDIAC SURGERY      stent    CHOLECYSTECTOMY  18 years ago    FRACTURE SURGERY Left     KNEE ARTHROSCOPY Right     SHOULDER ARTHROSCOPY Left 15 years ago    TIBIA FRACTURE SURGERY Left 1/26/15    ORIF left tibia nonunion fracture       Social History     Socioeconomic History    Marital status:      Spouse name: Not on file    Number of children: Not on file    Years of education: Not on file    Highest education level: Not on file   Occupational History    Occupation: ret   Tobacco Use    Smoking status: Former    Smokeless tobacco: Never    Tobacco comments:     quit:  14 years ago   Substance and Sexual Activity    Alcohol use: Yes     Comment: rare    Drug use: No    Sexual activity: Yes     Partners: Female   Other Topics Concern    Not on file   Social History Narrative

## 2024-02-09 ENCOUNTER — TELEPHONE (OUTPATIENT)
Dept: ORTHOPEDIC SURGERY | Age: 79
End: 2024-02-09

## 2024-02-09 NOTE — TELEPHONE ENCOUNTER
Patient was scheduled for 2/14/2024 at the [facility], [address] [place] [address]., [address], [place] [address].

## 2024-02-09 NOTE — TELEPHONE ENCOUNTER
General Question     Subject: EUFLEXXA - PLEASE GET PA   Patient and /or Facility Request: Mark Josue \"Mina\"  Contact Number: [phone]     Pt WOULD LIKE TO GET EUFLEXXA GEL INJECTIONS FOR L  KNEE AND IS READY FOR THE PA AND FOR THEM TO BE ORDERED.     PLEASE CALL WHEN OK TO SCHEDULE.

## 2024-02-14 ENCOUNTER — OFFICE VISIT (OUTPATIENT)
Dept: ORTHOPEDIC SURGERY | Age: 79
End: 2024-02-14
Payer: MEDICARE

## 2024-02-14 DIAGNOSIS — M17.32 POST-TRAUMATIC OSTEOARTHRITIS OF LEFT KNEE: Primary | ICD-10-CM

## 2024-02-14 PROCEDURE — 20610 DRAIN/INJ JOINT/BURSA W/O US: CPT | Performed by: ORTHOPAEDIC SURGERY

## 2024-02-14 PROCEDURE — 1123F ACP DISCUSS/DSCN MKR DOCD: CPT | Performed by: ORTHOPAEDIC SURGERY

## 2024-02-14 PROCEDURE — 99213 OFFICE O/P EST LOW 20 MIN: CPT | Performed by: ORTHOPAEDIC SURGERY

## 2024-02-14 RX ORDER — LIDOCAINE HYDROCHLORIDE 10 MG/ML
4 INJECTION, SOLUTION INFILTRATION; PERINEURAL ONCE
Status: COMPLETED | OUTPATIENT
Start: 2024-02-14 | End: 2024-02-14

## 2024-02-14 RX ORDER — TRIAMCINOLONE ACETONIDE 40 MG/ML
40 INJECTION, SUSPENSION INTRA-ARTICULAR; INTRAMUSCULAR ONCE
Status: COMPLETED | OUTPATIENT
Start: 2024-02-14 | End: 2024-02-14

## 2024-02-14 RX ADMIN — TRIAMCINOLONE ACETONIDE 40 MG: 40 INJECTION, SUSPENSION INTRA-ARTICULAR; INTRAMUSCULAR at 08:54

## 2024-02-14 RX ADMIN — LIDOCAINE HYDROCHLORIDE 4 ML: 10 INJECTION, SOLUTION INFILTRATION; PERINEURAL at 08:53

## 2024-02-14 NOTE — PROGRESS NOTES
CHIEF COMPLAINT:   1- Left knee pain/ posttraumatic osteoarthritis.  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    HISTORY:  Mr. Josue 78 y.o.  male presents today for f/u evaluation of left knee pain which started to worsen summer 2022 and worsening over time gradually. He had left knee cortisone injection on 11/22/2023 with good improvement. He had left knee Euflexxa injection on 7/5/2023 with good improvement.  He reported no to minimal achy pain 7/10.  Pain is increase with standing and walking and decrease with rest. Pain is sharp early in the morning with first few steps, dull achy pain by the end of the day. Alleviating factors: rest. No radiation and no numbness and tingling sensation. No other complaint.  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    Past Medical History:   Diagnosis Date    Anxiety     CAD (coronary artery disease)     Depression     GERD (gastroesophageal reflux disease)     Hyperlipidemia     Hypertension     Thyroid disease     Wears glasses        Past Surgical History:   Procedure Laterality Date    CARDIAC SURGERY      stent    CHOLECYSTECTOMY  18 years ago    FRACTURE SURGERY Left     KNEE ARTHROSCOPY Right     SHOULDER ARTHROSCOPY Left 15 years ago    TIBIA FRACTURE SURGERY Left 1/26/15    ORIF left tibia nonunion fracture       Social History     Socioeconomic History    Marital status:      Spouse name: Not on file    Number of children: Not on file    Years of education: Not on file    Highest education level: Not on file   Occupational History    Occupation: ret   Tobacco Use    Smoking status: Former    Smokeless tobacco: Never    Tobacco comments:     quit:  14 years ago   Substance and Sexual Activity    Alcohol use: Yes     Comment: rare    Drug use: No    Sexual activity: Yes     Partners: Female   Other Topics Concern    Not on file   Social History Narrative

## 2024-02-19 ENCOUNTER — TELEPHONE (OUTPATIENT)
Dept: ORTHOPEDIC SURGERY | Age: 79
End: 2024-02-19

## 2024-02-19 NOTE — TELEPHONE ENCOUNTER
Augustus Noe MA  P [facility] Schedule Staff  EUFLEXXA  (SERIES OF 3) LEFT KNEE    APPROVED FOR BUY & BILL  APPROVED # [id number].   DATES:  02/14/2024 - 08/12/2024.  PER FAX FROM [facility]. SAVED TO [employer].    [facility] stating that his injections were approved and to call [phone] to schedule his appointments.     Patient will need to schedule 1 injection per week for 3 consecutive weeks.

## 2024-02-28 ENCOUNTER — OFFICE VISIT (OUTPATIENT)
Dept: ORTHOPEDIC SURGERY | Age: 79
End: 2024-02-28
Payer: MEDICARE

## 2024-02-28 VITALS — BODY MASS INDEX: 31.05 KG/M2 | WEIGHT: 255 LBS | HEIGHT: 76 IN

## 2024-02-28 DIAGNOSIS — M17.32 POST-TRAUMATIC OSTEOARTHRITIS OF LEFT KNEE: Primary | ICD-10-CM

## 2024-02-28 DIAGNOSIS — S82.252K CLOSED DISPLACED COMMINUTED FRACTURE OF SHAFT OF LEFT TIBIA WITH NONUNION: ICD-10-CM

## 2024-02-28 PROCEDURE — 1123F ACP DISCUSS/DSCN MKR DOCD: CPT | Performed by: ORTHOPAEDIC SURGERY

## 2024-02-28 PROCEDURE — 99213 OFFICE O/P EST LOW 20 MIN: CPT | Performed by: ORTHOPAEDIC SURGERY

## 2024-02-28 PROCEDURE — 20610 DRAIN/INJ JOINT/BURSA W/O US: CPT | Performed by: ORTHOPAEDIC SURGERY

## 2024-02-28 RX ORDER — HYALURONATE SODIUM 10 MG/ML
20 SYRINGE (ML) INTRAARTICULAR ONCE
Status: COMPLETED | OUTPATIENT
Start: 2024-02-28 | End: 2024-02-28

## 2024-02-28 RX ADMIN — Medication 20 MG: at 10:41

## 2024-02-28 NOTE — PROGRESS NOTES
CHIEF COMPLAINT:   1- Left knee pain/ posttraumatic osteoarthritis.  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    HISTORY:  Mr. Josue 78 y.o.  male presents today for f/u evaluation of left knee pain which started to worsen summer 2022 and worsening over time gradually. He had left knee cortisone injection on 2/14/2024 with good improvement. He had left knee Euflexxa injection on 7/5/2023 with good improvement.  He reported no to minimal achy pain 5/10.  Pain is increase with standing and walking and decrease with rest. Pain is sharp early in the morning with first few steps, dull achy pain by the end of the day. Alleviating factors: rest. No radiation and no numbness and tingling sensation. No other complaint.  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    Past Medical History:   Diagnosis Date    Anxiety     CAD (coronary artery disease)     Depression     GERD (gastroesophageal reflux disease)     Hyperlipidemia     Hypertension     Thyroid disease     Wears glasses        Past Surgical History:   Procedure Laterality Date    CARDIAC SURGERY      stent    CHOLECYSTECTOMY  18 years ago    FRACTURE SURGERY Left     KNEE ARTHROSCOPY Right     SHOULDER ARTHROSCOPY Left 15 years ago    TIBIA FRACTURE SURGERY Left 1/26/15    ORIF left tibia nonunion fracture       Social History     Socioeconomic History    Marital status:      Spouse name: Not on file    Number of children: Not on file    Years of education: Not on file    Highest education level: Not on file   Occupational History    Occupation: ret   Tobacco Use    Smoking status: Former    Smokeless tobacco: Never    Tobacco comments:     quit:  14 years ago   Substance and Sexual Activity    Alcohol use: Yes     Comment: rare    Drug use: No    Sexual activity: Yes     Partners: Female   Other Topics Concern    Not on file   Social History Narrative

## 2024-03-06 ENCOUNTER — OFFICE VISIT (OUTPATIENT)
Dept: ORTHOPEDIC SURGERY | Age: 79
End: 2024-03-06
Payer: MEDICARE

## 2024-03-06 VITALS — BODY MASS INDEX: 31.05 KG/M2 | HEIGHT: 76 IN | WEIGHT: 255 LBS

## 2024-03-06 DIAGNOSIS — M17.32 POST-TRAUMATIC ARTHRITIS OF LEFT LOWER LEG: ICD-10-CM

## 2024-03-06 DIAGNOSIS — M17.32 POST-TRAUMATIC OSTEOARTHRITIS OF LEFT KNEE: Primary | ICD-10-CM

## 2024-03-06 DIAGNOSIS — S82.252K CLOSED DISPLACED COMMINUTED FRACTURE OF SHAFT OF LEFT TIBIA WITH NONUNION: ICD-10-CM

## 2024-03-06 PROCEDURE — 1123F ACP DISCUSS/DSCN MKR DOCD: CPT | Performed by: ORTHOPAEDIC SURGERY

## 2024-03-06 PROCEDURE — 99213 OFFICE O/P EST LOW 20 MIN: CPT | Performed by: ORTHOPAEDIC SURGERY

## 2024-03-06 PROCEDURE — 20610 DRAIN/INJ JOINT/BURSA W/O US: CPT | Performed by: ORTHOPAEDIC SURGERY

## 2024-03-06 RX ORDER — HYALURONATE SODIUM 10 MG/ML
20 SYRINGE (ML) INTRAARTICULAR ONCE
Status: COMPLETED | OUTPATIENT
Start: 2024-03-06 | End: 2024-03-06

## 2024-03-06 RX ADMIN — Medication 20 MG: at 13:18

## 2024-03-06 NOTE — PROGRESS NOTES
Not on file     Social Determinants of Health     Financial Resource Strain: Not on file   Food Insecurity: Not on file   Transportation Needs: Not on file   Physical Activity: Unknown (7/2/2023)    Exercise Vital Sign     Days of Exercise per Week: 0 days     Minutes of Exercise per Session: Not on file   Stress: Not on file   Social Connections: Not on file   Intimate Partner Violence: Not At Risk (7/2/2023)    Humiliation, Afraid, Rape, and Kick questionnaire     Fear of Current or Ex-Partner: No     Emotionally Abused: No     Physically Abused: No     Sexually Abused: No   Housing Stability: Not on file       Family History   Problem Relation Age of Onset    Cancer Mother     Other Father         MVA       Current Outpatient Medications on File Prior to Visit   Medication Sig Dispense Refill    meloxicam (MOBIC) 7.5 MG tablet Take 1 tablet by mouth in the morning. 30 tablet 0    vitamin D (CHOLECALCIFEROL) 1000 UNIT TABS tablet Take 1,000 Units by mouth daily.      LORazepam (ATIVAN) 1 MG tablet Take 0.5 mg by mouth 2 times daily.      buPROPion SR (WELLBUTRIN SR) 150 MG SR tablet Take 150 mg by mouth 2 times daily.      simvastatin (ZOCOR) 40 MG tablet Take 20 mg by mouth nightly.      busPIRone (BUSPAR) 10 MG tablet Take 15 mg by mouth 2 times daily. (Patient not taking: Reported on 5/19/2021)      omeprazole (PRILOSEC) 20 MG capsule Take 20 mg by mouth nightly.      levothyroxine (SYNTHROID) 125 MCG tablet Take 125 mcg by mouth Daily.       Current Facility-Administered Medications on File Prior to Visit   Medication Dose Route Frequency Provider Last Rate Last Admin    oxyCODONE-acetaminophen (PERCOCET) 5-325 MG per tablet 1 tablet  1 tablet Oral Q4H PRN Mark Mccall MD           Pertinent items are noted in HPI  Review of systems reviewed from Patient History Form and available in the patient's chart under the Media tab.        PHYSICAL EXAMINATION:  Mr. Josue is a very pleasant 78 y.o.

## 2024-03-13 ENCOUNTER — OFFICE VISIT (OUTPATIENT)
Dept: ORTHOPEDIC SURGERY | Age: 79
End: 2024-03-13

## 2024-03-13 VITALS — WEIGHT: 255.07 LBS | BODY MASS INDEX: 31.06 KG/M2 | HEIGHT: 76 IN

## 2024-03-13 DIAGNOSIS — M17.32 POST-TRAUMATIC ARTHRITIS OF LEFT LOWER LEG: ICD-10-CM

## 2024-03-13 DIAGNOSIS — S82.252K CLOSED DISPLACED COMMINUTED FRACTURE OF SHAFT OF LEFT TIBIA WITH NONUNION: ICD-10-CM

## 2024-03-13 DIAGNOSIS — M17.32 POST-TRAUMATIC OSTEOARTHRITIS OF LEFT KNEE: Primary | ICD-10-CM

## 2024-03-13 RX ORDER — HYALURONATE SODIUM 10 MG/ML
20 SYRINGE (ML) INTRAARTICULAR ONCE
Status: COMPLETED | OUTPATIENT
Start: 2024-03-13 | End: 2024-03-13

## 2024-03-13 RX ADMIN — Medication 20 MG: at 10:28

## 2024-03-13 NOTE — PROGRESS NOTES
condition, the left knee was prepared and draped with alcohol and injected with 3rd Eufflexxa through the medial parapatellar port area.  The patient tolerated the procedure well.   A band-aid was applied and the patient was advised to ice the knee for 15-20 minutes to relieve any injection site related pain.    He reported a good improvement immediatly after the injection.     F/U in 3-4 months, PT if indicated.         Parag Britton MD

## 2024-03-27 SDOH — HEALTH STABILITY: PHYSICAL HEALTH: ON AVERAGE, HOW MANY DAYS PER WEEK DO YOU ENGAGE IN MODERATE TO STRENUOUS EXERCISE (LIKE A BRISK WALK)?: 0 DAYS

## 2024-03-27 SDOH — HEALTH STABILITY: PHYSICAL HEALTH: ON AVERAGE, HOW MANY MINUTES DO YOU ENGAGE IN EXERCISE AT THIS LEVEL?: 0 MIN

## 2024-03-29 ENCOUNTER — OFFICE VISIT (OUTPATIENT)
Dept: ORTHOPEDIC SURGERY | Age: 79
End: 2024-03-29
Payer: MEDICARE

## 2024-03-29 VITALS — WEIGHT: 255 LBS | HEIGHT: 75 IN | BODY MASS INDEX: 31.71 KG/M2

## 2024-03-29 DIAGNOSIS — M17.32 POST-TRAUMATIC OSTEOARTHRITIS OF LEFT KNEE: Primary | ICD-10-CM

## 2024-03-29 PROCEDURE — 1123F ACP DISCUSS/DSCN MKR DOCD: CPT | Performed by: ORTHOPAEDIC SURGERY

## 2024-03-29 PROCEDURE — 99213 OFFICE O/P EST LOW 20 MIN: CPT | Performed by: ORTHOPAEDIC SURGERY

## 2024-03-29 NOTE — PROGRESS NOTES
[facility]  Office Visit    Chief Complaint: Left knee pain    HPI:  Mark Josue is a 78 y.o. who is here for evaluation of left knee pain.  He is seen in and been treated by Dr. Britton for this issue for multiple years.  His history is significant for left proximal tibia fracture that he sustained about 40 years ago.  He subsequently had the hardware removed after that surgery.  However, he again fractured his left proximal tibia in November 2014.  This was initially treated in a cast nonoperatively for approximately 2 months at  before he underwent open reduction internal fixation in February 2015 with Dr. Britton.  He did go on to heal this fracture.  He now reports worsening left knee pain.  He has been treated in the past with steroid and Euflexxa injections.  However, he continues to have pain on a daily basis that is worse with weightbearing activities including steps. The patient has difficulty climbing stairs, difficulty getting out of a chair, difficulty with activities of daily living including hygiene and pain at night.  Pain level at rest is 7 and with activities 9-10/10.     He does have congestive heart failure and takes aspirin.  He denies diabetes, history of blood clots, tobacco use.  He has help at home.  He walks with use of a cane.  He also wears a knee brace.      Past Medical History:   Diagnosis Date    Anxiety     CAD (coronary artery disease)     Depression     GERD (gastroesophageal reflux disease)     Hyperlipidemia     Hypertension     Thyroid disease     Wears glasses         ROS:  Constitutional: denies fever, chills, weight loss  MSK: denies pain in other joints, muscle aches  Neurological: denies numbness, tingling, weakness    Exam:  Ht 1.905 m (6' 3\")   Wt 115.7 kg (255 lb)   BMI 31.87 kg/m²      Appearance: sitting in exam room chair, appears to be in no acute distress, awake and alert  Resp: unlabored breathing on room air  Skin: warm, dry and

## 2024-04-03 ENCOUNTER — TELEPHONE (OUTPATIENT)
Dept: ORTHOPEDIC SURGERY | Age: 79
End: 2024-04-03

## 2024-04-03 NOTE — TELEPHONE ENCOUNTER
Milena from call center called, patient called in to see if he stop by tomorrow (Thursday) and get fitted for a brace?  I asked Alison about it, and she did not see anything in the notes, and told me to send a message.      Please call patient [phone], thank you!

## 2024-04-04 ENCOUNTER — TELEPHONE (OUTPATIENT)
Dept: ORTHOPEDIC SURGERY | Age: 79
End: 2024-04-04

## 2024-04-04 DIAGNOSIS — M17.32 POST-TRAUMATIC OSTEOARTHRITIS OF LEFT KNEE: Primary | ICD-10-CM

## 2024-04-05 ENCOUNTER — PREP FOR PROCEDURE (OUTPATIENT)
Dept: ORTHOPEDIC SURGERY | Age: 79
End: 2024-04-05

## 2024-04-05 PROBLEM — M17.12 OSTEOARTHRITIS OF LEFT KNEE: Status: ACTIVE | Noted: 2024-04-05

## 2024-04-08 ENCOUNTER — APPOINTMENT (OUTPATIENT)
Dept: PHYSICAL THERAPY | Age: 79
End: 2024-04-08
Payer: MEDICARE

## 2024-04-09 ENCOUNTER — HOSPITAL ENCOUNTER (OUTPATIENT)
Dept: PHYSICAL THERAPY | Age: 79
Setting detail: THERAPIES SERIES
Discharge: HOME | End: 2024-04-09
Payer: MEDICARE

## 2024-04-09 DIAGNOSIS — M17.12 LOCALIZED OSTEOARTHRITIS OF LEFT KNEE: Primary | ICD-10-CM

## 2024-04-09 PROCEDURE — 97530 THERAPEUTIC ACTIVITIES: CPT

## 2024-04-09 PROCEDURE — 97161 PT EVAL LOW COMPLEX 20 MIN: CPT

## 2024-04-09 PROCEDURE — 97112 NEUROMUSCULAR REEDUCATION: CPT

## 2024-04-09 NOTE — PLAN OF CARE
functional ROM, Decreased core/proximal hip strength and neuromuscular control, Decreased LE functional strength , and Reduced balance/proprioceptive control    Functional Activity Limitations (from functional questionnaire and intake):  Reduced ability to tolerate prolonged functional positions  Reduced ability or difficulty with changes of positions or transfers between positions  Reduced ability to maintain good posture and demonstrate good body mechanics with sitting, bending, and lifting  Reduced ability to sleep  Reduced ability to perform lifting, reaching, carrying tasks  Reduced ability to squat  Reduced ability to ambulate prolonged functional periods/distances/surfaces  Reduced ability to ascend/descend stairs    Participation Restrictions:   Reduced participation in social activities    Classification :   Signs/symptoms consistent with knee OA/hip OA    Barriers to/and or personal factors that will affect rehab potential:   Co-morbidities    Physical Therapy Evaluation Complexity Justification  [x] A history of present problem and 1-2 personal factors and/or co-morbidities that impact the plan of care  [x] A total of 3 elements found upon examination of body systems using standardized tests and measures addressing any of the following: body structures, functions (impairments), activity limitations, and/or participation restrictions  [x] A clinical presentation with stable and/or uncomplicated characteristics   [x] Clinical decision making of LOW (45395 - Typically 20 minutes face-to-face) complexity using standardized patient assessment instrument and/or measurable assessment of functional outcome.    Today's Assessment: See above    Medical Necessity Documentation:  I certify that this patient meets the below criteria necessary for medical necessity for care and/or justification of therapy services:  The patient has functional impairments and/or activity limitations and would benefit from continued

## 2024-05-20 ENCOUNTER — OFFICE VISIT (OUTPATIENT)
Dept: ORTHOPEDIC SURGERY | Age: 79
End: 2024-05-20
Payer: OTHER GOVERNMENT

## 2024-05-20 VITALS — HEIGHT: 75 IN | BODY MASS INDEX: 31.71 KG/M2 | WEIGHT: 255 LBS

## 2024-05-20 DIAGNOSIS — M17.32 POST-TRAUMATIC OSTEOARTHRITIS OF LEFT KNEE: Primary | ICD-10-CM

## 2024-05-20 DIAGNOSIS — M17.11 ARTHRITIS OF RIGHT KNEE: ICD-10-CM

## 2024-05-20 PROCEDURE — 99213 OFFICE O/P EST LOW 20 MIN: CPT | Performed by: PHYSICIAN ASSISTANT

## 2024-05-20 PROCEDURE — 1123F ACP DISCUSS/DSCN MKR DOCD: CPT | Performed by: PHYSICIAN ASSISTANT

## 2024-05-20 PROCEDURE — 20610 DRAIN/INJ JOINT/BURSA W/O US: CPT | Performed by: PHYSICIAN ASSISTANT

## 2024-05-20 RX ORDER — BUPIVACAINE HYDROCHLORIDE 2.5 MG/ML
2 INJECTION, SOLUTION INFILTRATION; PERINEURAL ONCE
Status: COMPLETED | OUTPATIENT
Start: 2024-05-20 | End: 2024-05-20

## 2024-05-20 RX ORDER — TRIAMCINOLONE ACETONIDE 40 MG/ML
40 INJECTION, SUSPENSION INTRA-ARTICULAR; INTRAMUSCULAR ONCE
Status: COMPLETED | OUTPATIENT
Start: 2024-05-20 | End: 2024-05-20

## 2024-05-20 RX ADMIN — BUPIVACAINE HYDROCHLORIDE 5 MG: 2.5 INJECTION, SOLUTION INFILTRATION; PERINEURAL at 10:53

## 2024-05-20 RX ADMIN — TRIAMCINOLONE ACETONIDE 40 MG: 40 INJECTION, SUSPENSION INTRA-ARTICULAR; INTRAMUSCULAR at 10:55

## 2024-05-20 RX ADMIN — TRIAMCINOLONE ACETONIDE 40 MG: 40 INJECTION, SUSPENSION INTRA-ARTICULAR; INTRAMUSCULAR at 10:54

## 2024-05-20 NOTE — PROGRESS NOTES
This dictation was done with [email or web address] dictation and may contain mechanical errors related to translation.  Height 1.905 m (6' 3\"), weight 115.7 kg (255 lb).    This is a very pleasant 78-year-old gentleman who has bilateral knee osteoarthritis he is actually on the schedule for more than 3 months to have his left knee replaced.  Both knees are causing some soreness and pain he is getting ready to do some preconditioning physical therapy prior to the surgery so we talked about short and long-term expectations and we decided to do injections of cortisone in both of his knees today.    This patient is here in follow-up for ongoing pain and dysfunction in their knees. There x-rays done previously showed joint space narrowing subchondral sclerosis with osteophyte formation. They currently have had relief with injections of cortisone, anti-inflammatories and a home exercise program. There are here with increased pain over the last few days with swelling and dysfunction.  They noticed that there was some increasing pain and and swelling and disability over the last 7 to 10 days especially.  This increase led to them making an appointment.    On examination this is a well-developed patient in no acute distress. They are alert and oriented ×3. They walk without antalgia or any significant varus or valgus deformity. They have crepitus during flexion and extension in the patellofemoral joint. They have a negative Lachman and a negative Abdulaziz. There are good distal pulses and good dorsiflexion and plantarflexion strength present    My impression is bilateral knee osteoarthritis    After a discussion of the multiple options, they consented to a cortisone shot. 1 ml of 40mg/ml Kenalog and 2 ml's of 0.25%Marcaine were injected into both the right and the left knee joint spaces.  The leg was slightly flexed and injected just lateral to the patella tendon to under the patella. This was done with sterile technique and they tolerated

## 2024-07-08 ENCOUNTER — HOSPITAL ENCOUNTER (OUTPATIENT)
Dept: CT IMAGING | Age: 79
Discharge: HOME | End: 2024-07-08
Attending: ORTHOPAEDIC SURGERY
Payer: MEDICARE

## 2024-07-08 DIAGNOSIS — M17.32 POST-TRAUMATIC OSTEOARTHRITIS OF LEFT KNEE: ICD-10-CM

## 2024-07-08 PROCEDURE — 73700 CT LOWER EXTREMITY W/O DYE: CPT

## 2024-07-15 ENCOUNTER — TELEPHONE (OUTPATIENT)
Dept: ORTHOPEDIC SURGERY | Age: 79
End: 2024-07-15

## 2024-07-15 NOTE — TELEPHONE ENCOUNTER
Pt has [employer] product and they wont pay for the CT.   Apparently he had it done already on 7-8.  He said they called him to schedule it.   Now Seun wants a P2P because they dont think it is necessary.   How do we take care of this so he doesn't get a bill.

## 2024-08-07 ENCOUNTER — TELEPHONE (OUTPATIENT)
Dept: ORTHOPEDIC SURGERY | Age: 79
End: 2024-08-07

## 2024-08-07 NOTE — TELEPHONE ENCOUNTER
Pt wife works 7-8 hrs on the weekends every week.   Please call him regarding getting home healthcare for those hours she is gone.    [phone]

## 2024-08-12 ENCOUNTER — HOSPITAL ENCOUNTER (OUTPATIENT)
Dept: PREADMISSION TESTING | Age: 79
Discharge: HOME | End: 2024-08-16
Payer: MEDICARE

## 2024-08-12 ENCOUNTER — OFFICE VISIT (OUTPATIENT)
Dept: ORTHOPEDIC SURGERY | Age: 79
End: 2024-08-12
Payer: MEDICARE

## 2024-08-12 VITALS — HEIGHT: 75 IN | WEIGHT: 255 LBS | BODY MASS INDEX: 31.71 KG/M2

## 2024-08-12 DIAGNOSIS — M17.32 POST-TRAUMATIC OSTEOARTHRITIS OF LEFT KNEE: Primary | ICD-10-CM

## 2024-08-12 DIAGNOSIS — M17.32 POST-TRAUMATIC OSTEOARTHRITIS OF LEFT KNEE: ICD-10-CM

## 2024-08-12 LAB
25(OH)D3 SERPL-MCNC: 17.6 NG/ML
ABO + RH BLD: NORMAL
ALBUMIN SERPL-MCNC: 3.9 G/DL (ref 3.4–5)
ANION GAP SERPL CALCULATED.3IONS-SCNC: 15 MMOL/L (ref 3–16)
APTT BLD: 31.3 SEC (ref 22.1–36.4)
BASOPHILS # BLD: 0.1 K/UL (ref 0–0.2)
BASOPHILS NFR BLD: 0.7 %
BLD GP AB SCN SERPL QL: NORMAL
BUN SERPL-MCNC: 16 MG/DL (ref 7–20)
CALCIUM SERPL-MCNC: 8.8 MG/DL (ref 8.3–10.6)
CHLORIDE SERPL-SCNC: 104 MMOL/L (ref 99–110)
CO2 SERPL-SCNC: 22 MMOL/L (ref 21–32)
CREAT SERPL-MCNC: 1.3 MG/DL (ref 0.8–1.3)
DEPRECATED RDW RBC AUTO: 15.1 % (ref 12.4–15.4)
EOSINOPHIL # BLD: 0.1 K/UL (ref 0–0.6)
EOSINOPHIL NFR BLD: 1.7 %
EST. AVERAGE GLUCOSE BLD GHB EST-MCNC: 114 MG/DL
GFR SERPLBLD CREATININE-BSD FMLA CKD-EPI: 57 ML/MIN/{1.73_M2}
GLUCOSE SERPL-MCNC: 94 MG/DL (ref 70–99)
HBA1C MFR BLD: 5.6 %
HCT VFR BLD AUTO: 45.8 % (ref 40.5–52.5)
HGB BLD-MCNC: 15.9 G/DL (ref 13.5–17.5)
INR PPP: 1.15 (ref 0.85–1.15)
LYMPHOCYTES # BLD: 1.5 K/UL (ref 1–5.1)
LYMPHOCYTES NFR BLD: 17.2 %
MCH RBC QN AUTO: 33.8 PG (ref 26–34)
MCHC RBC AUTO-ENTMCNC: 34.7 G/DL (ref 31–36)
MCV RBC AUTO: 97.3 FL (ref 80–100)
MONOCYTES # BLD: 1 K/UL (ref 0–1.3)
MONOCYTES NFR BLD: 11.7 %
MRSA DNA SPEC QL NAA+PROBE: NORMAL
NEUTROPHILS # BLD: 6 K/UL (ref 1.7–7.7)
NEUTROPHILS NFR BLD: 68.7 %
PLATELET # BLD AUTO: 222 K/UL (ref 135–450)
PMV BLD AUTO: 8.9 FL (ref 5–10.5)
POTASSIUM SERPL-SCNC: 3.8 MMOL/L (ref 3.5–5.1)
PREALB SERPL-MCNC: 30.1 MG/DL (ref 20–40)
PROTHROMBIN TIME: 14.9 SEC (ref 11.9–14.9)
RBC # BLD AUTO: 4.7 M/UL (ref 4.2–5.9)
SODIUM SERPL-SCNC: 141 MMOL/L (ref 136–145)
WBC # BLD AUTO: 8.7 K/UL (ref 4–11)

## 2024-08-12 PROCEDURE — 86900 BLOOD TYPING SEROLOGIC ABO: CPT

## 2024-08-12 PROCEDURE — 83036 HEMOGLOBIN GLYCOSYLATED A1C: CPT

## 2024-08-12 PROCEDURE — 80048 BASIC METABOLIC PNL TOTAL CA: CPT

## 2024-08-12 PROCEDURE — 86901 BLOOD TYPING SEROLOGIC RH(D): CPT

## 2024-08-12 PROCEDURE — 85610 PROTHROMBIN TIME: CPT

## 2024-08-12 PROCEDURE — 86850 RBC ANTIBODY SCREEN: CPT

## 2024-08-12 PROCEDURE — 84134 ASSAY OF PREALBUMIN: CPT

## 2024-08-12 PROCEDURE — 82306 VITAMIN D 25 HYDROXY: CPT

## 2024-08-12 PROCEDURE — 85730 THROMBOPLASTIN TIME PARTIAL: CPT

## 2024-08-12 PROCEDURE — 82040 ASSAY OF SERUM ALBUMIN: CPT

## 2024-08-12 PROCEDURE — 99214 OFFICE O/P EST MOD 30 MIN: CPT | Performed by: ORTHOPAEDIC SURGERY

## 2024-08-12 PROCEDURE — 87641 MR-STAPH DNA AMP PROBE: CPT

## 2024-08-12 PROCEDURE — 85025 COMPLETE CBC W/AUTO DIFF WBC: CPT

## 2024-08-12 PROCEDURE — MISCCOLD COLD THERAPY UNIT AND PAD: Performed by: ORTHOPAEDIC SURGERY

## 2024-08-12 PROCEDURE — 1123F ACP DISCUSS/DSCN MKR DOCD: CPT | Performed by: ORTHOPAEDIC SURGERY

## 2024-08-12 RX ORDER — CARVEDILOL 25 MG/1
12.5 TABLET ORAL 2 TIMES DAILY WITH MEALS
COMMUNITY
Start: 2024-02-13

## 2024-08-12 RX ORDER — TADALAFIL 5 MG/1
5 TABLET ORAL DAILY
COMMUNITY

## 2024-08-12 RX ORDER — FLUTICASONE PROPIONATE 50 MCG
1 SPRAY, SUSPENSION (ML) NASAL EVERY EVENING
COMMUNITY
Start: 2024-02-13

## 2024-08-12 RX ORDER — ASPIRIN 81 MG/1
81 TABLET, CHEWABLE ORAL NIGHTLY
COMMUNITY

## 2024-08-12 RX ORDER — BUSPIRONE HYDROCHLORIDE 15 MG/1
15 TABLET ORAL 2 TIMES DAILY
COMMUNITY
End: 2024-08-13

## 2024-08-12 RX ORDER — DAPAGLIFLOZIN 10 MG/1
10 TABLET, FILM COATED ORAL DAILY
COMMUNITY
Start: 2024-04-14

## 2024-08-12 NOTE — PROGRESS NOTES
[facility]  Office Visit    Chief Complaint: Left knee pain    HPI:  Mark Josue is a 78 y.o. who is here in follow-up of left knee pain.  He is scheduled for left total knee arthroplasty next week.  For review, he has been seen and treated by Dr. Britton for this issue for multiple years.  His history is significant for left proximal tibia fracture that he sustained about 40 years ago.  He subsequently had the hardware removed after that surgery.  However, he again fractured his left proximal tibia in November 2014.  This was initially treated in a cast nonoperatively for approximately 2 months at  before he underwent open reduction internal fixation in February 2015 with Dr. Britton.  He did go on to heal this fracture.  He now reports worsening left knee pain.  He has been treated in the past with steroid and Euflexxa injections.  However, he continues to have pain on a daily basis that is worse with weightbearing activities including steps. The patient has difficulty climbing stairs, difficulty getting out of a chair, difficulty with activities of daily living including hygiene and pain at night.  Pain level at rest is 7 and with activities 9-10/10.     He does have congestive heart failure and takes aspirin.  He denies diabetes, history of blood clots, tobacco use.  He has help at home.  He walks with use of a cane.  He also wears a knee brace.      Past Medical History:   Diagnosis Date    Anxiety     CAD (coronary artery disease)     Depression     GERD (gastroesophageal reflux disease)     Hyperlipidemia     Hypertension     Thyroid disease     Wears glasses         ROS:  Constitutional: denies fever, chills, weight loss  MSK: denies pain in other joints, muscle aches  Neurological: denies numbness, tingling, weakness    Exam:  Ht 1.905 m (6' 3\")   Wt 115.7 kg (255 lb)   BMI 31.87 kg/m²      Appearance: sitting in exam room chair, appears to be in no acute distress, awake

## 2024-08-12 NOTE — PROGRESS NOTES
Patient  attended JET class on 8/12/2024 . Patient verified surgery for Total Knee replacement. Patient  received patient information and educational JET folder including the following handouts: jet powerpoint, ERAS, incentive spirometry including purpose and how to perform, case management contact information, hand hygiene, preventing constipation, choices for home health care agency list, pre-operative showering techniques and the use of anti-septic 3 days before surgery.  Interviews completed by PT, OT, and Nurse Navigator.  Labs and Tests to be completed/completed as ordered/necessary by outpatient lab if not already completed.  Anti-septic bottle given to patient to take home. Patient  states no further questions or concerns. Patient provided with orthopedic office, after hours clinic, case management, and nurse navigator contact information.    Date Of Surgery: 8/20/2024  Surgeon: Dr Deng  Per Patient Will see/Saw Primary care provider on 8/14/2024.     Will see/Saw Specialist Cardiology  on 7/12/2024.    HISTORY OF JOINT REPLACEMENT(S): No history of joint replacement    DC Plan: Home    HOME ASSISTANCE - WHO WILL BE STAYING WITH YOU AT HOME FOR FIRST SEVERAL DAYS? spouse Aria    DC TRANSPORTATION: spouse Aria    STEPS INTO HOME: through back 5; through front 3    STEPS TO BATHROOM/BEDROOM: none - one level    DME NEEDS:  Needs a rolling walker, agreeable to hospital durable medical equipment representative and company. Already has a cane and a shower chair.    LENGTH OF STAY HAS BEEN DISCUSSED WITH THE PATIENT, APPROPRIATE TO HIS/ HER PROCEDURE.  PATIENT HAS BEEN INFORMED THAT THEY WILL BE DISCHARGED WHEN THE PHYSICIAN DEEMS THEM MEDICALLY READY. MOST PATIENTS CAN EXPECT TO BE IN THE HOSPITAL ONE NIGHT OR 1-2 DAYS AS AN ADMISSION FOR THOSE WITH MEDICAL HEALTH ISSUES/COMPLICATIONS.    HOME CARE CHOICE(S): open to any agency, home health care list was provided to patient. States his home health care will

## 2024-08-12 NOTE — DISCHARGE INSTR - COC
[facility] Continuity of Care Form    Patient Name:  Mark Josue  : 1945    MRN:  [id number]    Admit date:  (Not on file)  Discharge date:  2024    Code Status Order: Prior  Advance Directives: Yes    Admitting Physician: Rory Deng MD  PCP: Darius Sow MD    Discharging Nurse: roxana  Discharging Hospital Unit/Room#: No information available for this encounter.  Discharging Unit Phone Number: [phone]    Emergency Contact:        Past Surgical History:  Past Surgical History:   Procedure Laterality Date    CARDIAC SURGERY      stent    CHOLECYSTECTOMY  18 years ago    FRACTURE SURGERY Left     KNEE ARTHROSCOPY Right     SHOULDER ARTHROSCOPY Left 15 years ago    TIBIA FRACTURE SURGERY Left 1/26/15    ORIF left tibia nonunion fracture       Immunization History:   Immunization History   Administered Date(s) Administered    COVID-19, PFIZER Bivalent, DO NOT Dilute, (age 12y+), IM, 30 mcg/0.3 mL 2022    COVID-19, PFIZER GRAY top, DO NOT Dilute, (age 12 y+), IM, 30 mcg/0.3 mL 2022    COVID-19, PFIZER PURPLE top, DILUTE for use, (age 12 y+), 30mcg/0.3mL 2021, 2021, 2021    COVID-19, PFIZER, (- formula), (age 12y+), IM, 30mcg/0.3mL 2023       Active Problems:  Principal Problem:    Osteoarthritis of left knee  Resolved Problems:    * No resolved hospital problems. *      Isolation/Infection:       Nurse Assessment:  Last Vital Signs:There were no vitals taken for this visit.  Last documented pain score (0-10 scale):    Last Weight:   Wt Readings from Last 1 Encounters:   24 115.7 kg (255 lb)     Mental Status:  oriented, alert, coherent, and able to concentrate and follow conversation     IV Access:  - None    Nursing Mobility/ADLs:  Walking   Assisted  Transfer  Assisted  Bathing  Independent  Dressing  Independent  Toileting  Independent  Feeding  Independent  Med Admin  Independent  Med Delivery   whole    Wound Care Documentation  signed by CELINA Morris - CNP on 8/12/24 at 2:59 PM EDT/ Dr Deng    CASE MANAGEMENT/SOCIAL WORK SECTION    Inpatient Status Date: ***    [facility] Readmission Risk Assessment Score:    Discharging to Facility/ Agency   Name:   Address:  Phone:  Fax:    Dialysis Facility (if applicable)   Name:  Address:  Dialysis Schedule:  Phone:  Fax:    / signature: {Esignature:[id number]}          Followup with Dr Deng 2 weeks after surgery in office   [phone]    [facility], [address], [address]   [id number]    DISCHARGE INSTRUCTIONS FOR TOTAL JOINT REPLACEMENT  Activity:  Wear knee hi GRAHAM hose daily and remove at night for 2 weeks post surgery  Elevate your leg if swelling occurs in your ankle.    Continue the exercise program as prescribed by physical therapists.  Take frequent walks.  Use walker, crutches, or cane with weight bearing instructions as indicated by the physical therapists.  Take rest periods often.   Incentive spirometer 4 times a day for 10 breaths for 1 weeks post surgery  Wear BIPAP/CPAP at all times when sleeping or napping if you have sleep apnea AND have a BIPAP/CPAP at home  Do not drive until cleared by your surgeon.  Wound Care:  Check the incision every day for drainage or wound opening. Call for concerns.   Do not scrub wound.  Pat it dry with a soft towel.   Don’t apply any lotions, ointments,  or creams to your wound.  Ice the operative knee over clothing or a cloth . Use as needed for swelling and pain control   Diet:  You can resume your normal diet.  There are no limits on your diet due to your surgery.  Pain pills and activity changes may lead to constipation.  To prevent this, use prune juice or bran cereals liberally.  You may need to use a laxative such as Dulcolax, Senokot, or Milk of Magnesia.  Drink plenty of fluids.  Medications:  Take pain pills as ordered to maintain comfort.  Never drive while taking pain

## 2024-08-13 ENCOUNTER — TELEPHONE (OUTPATIENT)
Dept: ORTHOPEDIC SURGERY | Age: 79
End: 2024-08-13

## 2024-08-13 NOTE — TELEPHONE ENCOUNTER
Other PATIENT IS CALLING IN WANTING TO KNOW IF HE SHOULD STOP TAKING BUPROPION. HE NEEDED CLARIFICATION SAYS DR WATSON DID NOT MENTION TO HIM TO STOP THIS.  [phone]

## 2024-08-13 NOTE — PROGRESS NOTES
Covid testing to be done @  If positive---Pt instructed to notify MD ASAP   If negative--pt was instructed to bring results DOP/DOS         PRE-OP INSTRUCTIONS     Do not eat or drink anything after 12:00 midnight prior to surgery.    This includes water, chewing gum, mints and ice chips.    You may brush your teeth and gargle the morning of surgery but DO  NOT SWALLOW THE WATER.    Take the following medications with a small sip of water on the morning of procedure...Follow your MD/Surgeons pre-procedure instructions regarding your medications     You may be asked to stop blood thinners such as:  Coumadin, Plavix, Fragmin and lovenox.  Please check with your doctor before stopping these or any other medications.    Aspirin, ibuprofen, advil and naproxen, any anti-inflammatory products should be stopped for a week prior to your surgery.    Do not smoke and do not drink any alcoholic beverages 24 hours prior to your surgery.    Please do not wear any jewelry or body piercings on the day of surgery.    Please wear something simple, loose fitting clothing to the hospital.  Do not wear any make-up(including eye make-up) or nail polish on your fingers and toes.    As part of our patient safety program to minimize surgical infections, we ask you to do the following:    Please notify your surgeon if you develop any illness between now and the day of your surgery.  This includes a cough, cold, fever, sore  throat, nausea, vomiting, diarrhea, etc.   Please notify your surgeon if you experience dizziness, shortness of  breath or blurred vision between now and the time of your surgery.     Please notify your surgeon of any open or redden areas that may look infected       DO NOT shave your operative site 96 hours(four days) prior to surgery.          Shower the week before surgery with an antibacterial soap such as:dial,safeguard, etc.       Three(3) days prior to your surgery, cleanse the operative site with

## 2024-08-13 NOTE — PROGRESS NOTES
Notification sent to Dr Deng and medical assistant Joanne VIVAS regarding abnormal preoperative labs and pertinent medical history.

## 2024-08-13 NOTE — PROGRESS NOTES
Follow Up Prior to Surgery    DOS:   :1945      History and Physical: Appointment  @ 1400 with Dr Sow                         Surgeon's Name: Dr Deng

## 2024-08-13 NOTE — PROGRESS NOTES
[facility] Pre-Admission Testing Electronic Communication Worksheet for OR/ENDO Procedures        Patient: Mark Josue    DOS: 8/20    Transportation Confirmed: [x] YES    []  NO    History and Physical:  [x] YES    []  NO  [] N/A  If yes, please list doctor or Urgent Care and date of H&P:   Appointment 8/14 @ 2818 with Dr Sow    Additional Clearance(Cardiac, Pulmonary, etc):  [x] YES    []  NO  Cardiac Clearance 7/2 with Dr Leija     Pre-Admission Testing Visit:  [x] YES    []  NO If no, do labs/testing need to be done DOS?  [] YES    [x]  NO    Medication Reconciliation Complete:  [x] YES    []  NO        Additional Notes:                Interview Complete: [x] YES    []  NO          Maddie Diego RN  11:09 AM

## 2024-08-19 ENCOUNTER — ANESTHESIA EVENT (OUTPATIENT)
Dept: OPERATING ROOM | Age: 79
End: 2024-08-19
Payer: MEDICARE

## 2024-08-20 ENCOUNTER — ANESTHESIA (OUTPATIENT)
Dept: OPERATING ROOM | Age: 79
End: 2024-08-20
Payer: MEDICARE

## 2024-08-20 ENCOUNTER — HOSPITAL ENCOUNTER (OUTPATIENT)
Age: 79
Setting detail: OBSERVATION
Discharge: NURSING, REHAB OR LONG-TERM CARE | End: 2024-08-23
Attending: ORTHOPAEDIC SURGERY | Admitting: ORTHOPAEDIC SURGERY
Payer: OTHER GOVERNMENT

## 2024-08-20 ENCOUNTER — APPOINTMENT (OUTPATIENT)
Dept: GENERAL RADIOLOGY | Age: 79
End: 2024-08-20
Attending: ORTHOPAEDIC SURGERY
Payer: OTHER GOVERNMENT

## 2024-08-20 DIAGNOSIS — M17.12 ARTHRITIS OF LEFT KNEE: Primary | ICD-10-CM

## 2024-08-20 LAB
ABO + RH BLD: NORMAL
BLD GP AB SCN SERPL QL: NORMAL
GLUCOSE BLD-MCNC: 115 MG/DL (ref 70–99)
GLUCOSE BLD-MCNC: 140 MG/DL (ref 70–99)
GLUCOSE BLD-MCNC: 157 MG/DL (ref 70–99)
GLUCOSE BLD-MCNC: 174 MG/DL (ref 70–99)
PERFORMED ON: ABNORMAL

## 2024-08-20 PROCEDURE — 3600000005 HC SURGERY LEVEL 5 BASE: Performed by: ORTHOPAEDIC SURGERY

## 2024-08-20 PROCEDURE — 6360000002 HC RX W HCPCS

## 2024-08-20 PROCEDURE — 2580000003 HC RX 258: Performed by: ORTHOPAEDIC SURGERY

## 2024-08-20 PROCEDURE — 6370000000 HC RX 637 (ALT 250 FOR IP): Performed by: ORTHOPAEDIC SURGERY

## 2024-08-20 PROCEDURE — 7100000001 HC PACU RECOVERY - ADDTL 15 MIN: Performed by: ORTHOPAEDIC SURGERY

## 2024-08-20 PROCEDURE — C9290 INJ, BUPIVACAINE LIPOSOME: HCPCS | Performed by: ORTHOPAEDIC SURGERY

## 2024-08-20 PROCEDURE — G0378 HOSPITAL OBSERVATION PER HR: HCPCS

## 2024-08-20 PROCEDURE — 2709999900 HC NON-CHARGEABLE SUPPLY: Performed by: ORTHOPAEDIC SURGERY

## 2024-08-20 PROCEDURE — 2700000000 HC OXYGEN THERAPY PER DAY

## 2024-08-20 PROCEDURE — 6360000002 HC RX W HCPCS: Performed by: ANESTHESIOLOGY

## 2024-08-20 PROCEDURE — 2500000003 HC RX 250 WO HCPCS: Performed by: ANESTHESIOLOGY

## 2024-08-20 PROCEDURE — 97530 THERAPEUTIC ACTIVITIES: CPT

## 2024-08-20 PROCEDURE — 86900 BLOOD TYPING SEROLOGIC ABO: CPT

## 2024-08-20 PROCEDURE — 2580000003 HC RX 258: Performed by: ANESTHESIOLOGY

## 2024-08-20 PROCEDURE — 20985 CPTR-ASST DIR MS PX: CPT | Performed by: ORTHOPAEDIC SURGERY

## 2024-08-20 PROCEDURE — 86850 RBC ANTIBODY SCREEN: CPT

## 2024-08-20 PROCEDURE — 2500000003 HC RX 250 WO HCPCS

## 2024-08-20 PROCEDURE — 3700000001 HC ADD 15 MINUTES (ANESTHESIA): Performed by: ORTHOPAEDIC SURGERY

## 2024-08-20 PROCEDURE — A4217 STERILE WATER/SALINE, 500 ML: HCPCS | Performed by: ORTHOPAEDIC SURGERY

## 2024-08-20 PROCEDURE — 6360000002 HC RX W HCPCS: Performed by: ORTHOPAEDIC SURGERY

## 2024-08-20 PROCEDURE — C1776 JOINT DEVICE (IMPLANTABLE): HCPCS | Performed by: ORTHOPAEDIC SURGERY

## 2024-08-20 PROCEDURE — 3700000000 HC ANESTHESIA ATTENDED CARE: Performed by: ORTHOPAEDIC SURGERY

## 2024-08-20 PROCEDURE — 64447 NJX AA&/STRD FEMORAL NRV IMG: CPT | Performed by: ANESTHESIOLOGY

## 2024-08-20 PROCEDURE — 2720000010 HC SURG SUPPLY STERILE: Performed by: ORTHOPAEDIC SURGERY

## 2024-08-20 PROCEDURE — 73560 X-RAY EXAM OF KNEE 1 OR 2: CPT

## 2024-08-20 PROCEDURE — 27447 TOTAL KNEE ARTHROPLASTY: CPT | Performed by: ORTHOPAEDIC SURGERY

## 2024-08-20 PROCEDURE — 3600000015 HC SURGERY LEVEL 5 ADDTL 15MIN: Performed by: ORTHOPAEDIC SURGERY

## 2024-08-20 PROCEDURE — 97116 GAIT TRAINING THERAPY: CPT

## 2024-08-20 PROCEDURE — 97162 PT EVAL MOD COMPLEX 30 MIN: CPT

## 2024-08-20 PROCEDURE — 94760 N-INVAS EAR/PLS OXIMETRY 1: CPT

## 2024-08-20 PROCEDURE — 27447 TOTAL KNEE ARTHROPLASTY: CPT | Performed by: PHYSICIAN ASSISTANT

## 2024-08-20 PROCEDURE — 86901 BLOOD TYPING SEROLOGIC RH(D): CPT

## 2024-08-20 PROCEDURE — 7100000000 HC PACU RECOVERY - FIRST 15 MIN: Performed by: ORTHOPAEDIC SURGERY

## 2024-08-20 DEVICE — INSERT TIB SZ 7 THK11MM KNEE X3 CNDYL STBL BEAR TECHNOLOGY: Type: IMPLANTABLE DEVICE | Site: KNEE | Status: FUNCTIONAL

## 2024-08-20 DEVICE — CEMENT BNE RADIOPAQUE FAST SET ACRYL RESIN HI VISC SIMPLEXHV: Type: IMPLANTABLE DEVICE | Site: KNEE | Status: FUNCTIONAL

## 2024-08-20 DEVICE — IMPLANTABLE DEVICE: Type: IMPLANTABLE DEVICE | Site: KNEE | Status: FUNCTIONAL

## 2024-08-20 DEVICE — IMPLANTABLE DEVICE: Type: IMPLANTABLE DEVICE | Site: PATELLA | Status: FUNCTIONAL

## 2024-08-20 DEVICE — BASEPLATE TIB SZ 7 KNEE TRITANIUM CEM PRI LO PROF TRIATHLON: Type: IMPLANTABLE DEVICE | Site: KNEE | Status: FUNCTIONAL

## 2024-08-20 RX ORDER — BUPROPION HYDROCHLORIDE 150 MG/1
150 TABLET, EXTENDED RELEASE ORAL 2 TIMES DAILY
Status: DISCONTINUED | OUTPATIENT
Start: 2024-08-20 | End: 2024-08-23 | Stop reason: HOSPADM

## 2024-08-20 RX ORDER — VASOPRESSIN 20 U/ML
INJECTION PARENTERAL PRN
Status: DISCONTINUED | OUTPATIENT
Start: 2024-08-20 | End: 2024-08-20 | Stop reason: SDUPTHER

## 2024-08-20 RX ORDER — SODIUM CHLORIDE 0.9 % (FLUSH) 0.9 %
5-40 SYRINGE (ML) INJECTION PRN
Status: DISCONTINUED | OUTPATIENT
Start: 2024-08-20 | End: 2024-08-20 | Stop reason: HOSPADM

## 2024-08-20 RX ORDER — OXYCODONE HYDROCHLORIDE 10 MG/1
10 TABLET ORAL PRN
Status: DISCONTINUED | OUTPATIENT
Start: 2024-08-20 | End: 2024-08-20 | Stop reason: HOSPADM

## 2024-08-20 RX ORDER — ACETAMINOPHEN 325 MG/1
650 TABLET ORAL
Status: DISCONTINUED | OUTPATIENT
Start: 2024-08-20 | End: 2024-08-20 | Stop reason: HOSPADM

## 2024-08-20 RX ORDER — MIDAZOLAM HYDROCHLORIDE 1 MG/ML
2 INJECTION INTRAMUSCULAR; INTRAVENOUS ONCE
Status: COMPLETED | OUTPATIENT
Start: 2024-08-20 | End: 2024-08-20

## 2024-08-20 RX ORDER — LIDOCAINE HYDROCHLORIDE 20 MG/ML
INJECTION, SOLUTION EPIDURAL; INFILTRATION; INTRACAUDAL; PERINEURAL PRN
Status: DISCONTINUED | OUTPATIENT
Start: 2024-08-20 | End: 2024-08-20 | Stop reason: SDUPTHER

## 2024-08-20 RX ORDER — SODIUM CHLORIDE 450 MG/100ML
INJECTION, SOLUTION INTRAVENOUS CONTINUOUS
Status: DISCONTINUED | OUTPATIENT
Start: 2024-08-20 | End: 2024-08-23 | Stop reason: HOSPADM

## 2024-08-20 RX ORDER — LEVOTHYROXINE SODIUM 125 UG/1
125 TABLET ORAL DAILY
Status: DISCONTINUED | OUTPATIENT
Start: 2024-08-21 | End: 2024-08-23 | Stop reason: HOSPADM

## 2024-08-20 RX ORDER — FENTANYL CITRATE 0.05 MG/ML
50 INJECTION, SOLUTION INTRAMUSCULAR; INTRAVENOUS EVERY 5 MIN PRN
Status: DISCONTINUED | OUTPATIENT
Start: 2024-08-20 | End: 2024-08-20 | Stop reason: HOSPADM

## 2024-08-20 RX ORDER — PROPOFOL 10 MG/ML
INJECTION, EMULSION INTRAVENOUS PRN
Status: DISCONTINUED | OUTPATIENT
Start: 2024-08-20 | End: 2024-08-20 | Stop reason: SDUPTHER

## 2024-08-20 RX ORDER — INSULIN LISPRO 100 [IU]/ML
0.08 INJECTION, SOLUTION INTRAVENOUS; SUBCUTANEOUS
Status: DISCONTINUED | OUTPATIENT
Start: 2024-08-20 | End: 2024-08-22

## 2024-08-20 RX ORDER — MORPHINE SULFATE 2 MG/ML
2 INJECTION, SOLUTION INTRAMUSCULAR; INTRAVENOUS
Status: DISCONTINUED | OUTPATIENT
Start: 2024-08-20 | End: 2024-08-23 | Stop reason: HOSPADM

## 2024-08-20 RX ORDER — DEXTROSE MONOHYDRATE 100 MG/ML
INJECTION, SOLUTION INTRAVENOUS CONTINUOUS PRN
Status: DISCONTINUED | OUTPATIENT
Start: 2024-08-20 | End: 2024-08-23 | Stop reason: HOSPADM

## 2024-08-20 RX ORDER — BUPIVACAINE HYDROCHLORIDE AND EPINEPHRINE 2.5; 5 MG/ML; UG/ML
30 INJECTION, SOLUTION EPIDURAL; INFILTRATION; INTRACAUDAL; PERINEURAL ONCE
Status: COMPLETED | OUTPATIENT
Start: 2024-08-20 | End: 2024-08-20

## 2024-08-20 RX ORDER — OXYCODONE HYDROCHLORIDE 5 MG/1
5 TABLET ORAL EVERY 4 HOURS PRN
Status: DISCONTINUED | OUTPATIENT
Start: 2024-08-20 | End: 2024-08-23 | Stop reason: HOSPADM

## 2024-08-20 RX ORDER — POLYETHYLENE GLYCOL 3350 17 G/17G
17 POWDER, FOR SOLUTION ORAL DAILY PRN
Status: DISCONTINUED | OUTPATIENT
Start: 2024-08-20 | End: 2024-08-23 | Stop reason: HOSPADM

## 2024-08-20 RX ORDER — ATORVASTATIN CALCIUM 10 MG/1
10 TABLET, FILM COATED ORAL NIGHTLY
Status: DISCONTINUED | OUTPATIENT
Start: 2024-08-20 | End: 2024-08-23 | Stop reason: HOSPADM

## 2024-08-20 RX ORDER — SODIUM CHLORIDE 9 MG/ML
INJECTION, SOLUTION INTRAVENOUS PRN
Status: DISCONTINUED | OUTPATIENT
Start: 2024-08-20 | End: 2024-08-20 | Stop reason: HOSPADM

## 2024-08-20 RX ORDER — INSULIN LISPRO 100 [IU]/ML
0-4 INJECTION, SOLUTION INTRAVENOUS; SUBCUTANEOUS NIGHTLY
Status: DISCONTINUED | OUTPATIENT
Start: 2024-08-20 | End: 2024-08-22

## 2024-08-20 RX ORDER — TRANEXAMIC ACID 650 MG/1
1950 TABLET ORAL ONCE
Status: COMPLETED | OUTPATIENT
Start: 2024-08-20 | End: 2024-08-20

## 2024-08-20 RX ORDER — ONDANSETRON 2 MG/ML
INJECTION INTRAMUSCULAR; INTRAVENOUS PRN
Status: DISCONTINUED | OUTPATIENT
Start: 2024-08-20 | End: 2024-08-20 | Stop reason: SDUPTHER

## 2024-08-20 RX ORDER — ONDANSETRON 2 MG/ML
4 INJECTION INTRAMUSCULAR; INTRAVENOUS EVERY 6 HOURS PRN
Status: DISCONTINUED | OUTPATIENT
Start: 2024-08-20 | End: 2024-08-23 | Stop reason: HOSPADM

## 2024-08-20 RX ORDER — SODIUM CHLORIDE 9 MG/ML
INJECTION, SOLUTION INTRAVENOUS PRN
Status: DISCONTINUED | OUTPATIENT
Start: 2024-08-20 | End: 2024-08-23 | Stop reason: HOSPADM

## 2024-08-20 RX ORDER — ACETAMINOPHEN 500 MG
1000 TABLET ORAL ONCE
Status: COMPLETED | OUTPATIENT
Start: 2024-08-20 | End: 2024-08-20

## 2024-08-20 RX ORDER — OXYCODONE HYDROCHLORIDE 10 MG/1
10 TABLET ORAL EVERY 4 HOURS PRN
Status: DISCONTINUED | OUTPATIENT
Start: 2024-08-20 | End: 2024-08-23 | Stop reason: HOSPADM

## 2024-08-20 RX ORDER — ROCURONIUM BROMIDE 10 MG/ML
INJECTION, SOLUTION INTRAVENOUS PRN
Status: DISCONTINUED | OUTPATIENT
Start: 2024-08-20 | End: 2024-08-20 | Stop reason: SDUPTHER

## 2024-08-20 RX ORDER — MORPHINE SULFATE 4 MG/ML
4 INJECTION, SOLUTION INTRAMUSCULAR; INTRAVENOUS
Status: DISCONTINUED | OUTPATIENT
Start: 2024-08-20 | End: 2024-08-23 | Stop reason: HOSPADM

## 2024-08-20 RX ORDER — INSULIN GLARGINE 100 [IU]/ML
0.25 INJECTION, SOLUTION SUBCUTANEOUS NIGHTLY
Status: DISCONTINUED | OUTPATIENT
Start: 2024-08-20 | End: 2024-08-22

## 2024-08-20 RX ORDER — SODIUM CHLORIDE 0.9 % (FLUSH) 0.9 %
5-40 SYRINGE (ML) INJECTION EVERY 12 HOURS SCHEDULED
Status: DISCONTINUED | OUTPATIENT
Start: 2024-08-20 | End: 2024-08-20 | Stop reason: HOSPADM

## 2024-08-20 RX ORDER — GLUCAGON 1 MG/ML
1 KIT INJECTION PRN
Status: DISCONTINUED | OUTPATIENT
Start: 2024-08-20 | End: 2024-08-23 | Stop reason: HOSPADM

## 2024-08-20 RX ORDER — DULOXETIN HYDROCHLORIDE 60 MG/1
60 CAPSULE, DELAYED RELEASE ORAL DAILY
Status: DISCONTINUED | OUTPATIENT
Start: 2024-08-20 | End: 2024-08-20 | Stop reason: HOSPADM

## 2024-08-20 RX ORDER — DEXAMETHASONE SODIUM PHOSPHATE 4 MG/ML
INJECTION, SOLUTION INTRA-ARTICULAR; INTRALESIONAL; INTRAMUSCULAR; INTRAVENOUS; SOFT TISSUE PRN
Status: DISCONTINUED | OUTPATIENT
Start: 2024-08-20 | End: 2024-08-20 | Stop reason: SDUPTHER

## 2024-08-20 RX ORDER — PHENYLEPHRINE HCL IN 0.9% NACL 1 MG/10 ML
SYRINGE (ML) INTRAVENOUS PRN
Status: DISCONTINUED | OUTPATIENT
Start: 2024-08-20 | End: 2024-08-20 | Stop reason: SDUPTHER

## 2024-08-20 RX ORDER — FENTANYL CITRATE 0.05 MG/ML
100 INJECTION, SOLUTION INTRAMUSCULAR; INTRAVENOUS ONCE
Status: DISCONTINUED | OUTPATIENT
Start: 2024-08-20 | End: 2024-08-20

## 2024-08-20 RX ORDER — ONDANSETRON 2 MG/ML
4 INJECTION INTRAMUSCULAR; INTRAVENOUS
Status: DISCONTINUED | OUTPATIENT
Start: 2024-08-20 | End: 2024-08-20 | Stop reason: HOSPADM

## 2024-08-20 RX ORDER — MAGNESIUM HYDROXIDE 1200 MG/15ML
LIQUID ORAL CONTINUOUS PRN
Status: DISCONTINUED | OUTPATIENT
Start: 2024-08-20 | End: 2024-08-20 | Stop reason: HOSPADM

## 2024-08-20 RX ORDER — SODIUM CHLORIDE 0.9 % (FLUSH) 0.9 %
5-40 SYRINGE (ML) INJECTION PRN
Status: DISCONTINUED | OUTPATIENT
Start: 2024-08-20 | End: 2024-08-23 | Stop reason: HOSPADM

## 2024-08-20 RX ORDER — OXYCODONE HYDROCHLORIDE 5 MG/1
5 TABLET ORAL PRN
Status: DISCONTINUED | OUTPATIENT
Start: 2024-08-20 | End: 2024-08-20 | Stop reason: HOSPADM

## 2024-08-20 RX ORDER — FENTANYL CITRATE 50 UG/ML
INJECTION, SOLUTION INTRAMUSCULAR; INTRAVENOUS PRN
Status: DISCONTINUED | OUTPATIENT
Start: 2024-08-20 | End: 2024-08-20 | Stop reason: SDUPTHER

## 2024-08-20 RX ORDER — CALCIUM CARBONATE 500 MG/1
500 TABLET, CHEWABLE ORAL 3 TIMES DAILY PRN
Status: DISCONTINUED | OUTPATIENT
Start: 2024-08-20 | End: 2024-08-23 | Stop reason: HOSPADM

## 2024-08-20 RX ORDER — ASPIRIN 81 MG/1
81 TABLET ORAL 2 TIMES DAILY
Status: DISCONTINUED | OUTPATIENT
Start: 2024-08-20 | End: 2024-08-23 | Stop reason: HOSPADM

## 2024-08-20 RX ORDER — SUCCINYLCHOLINE/SOD CL,ISO/PF 200MG/10ML
SYRINGE (ML) INTRAVENOUS PRN
Status: DISCONTINUED | OUTPATIENT
Start: 2024-08-20 | End: 2024-08-20 | Stop reason: SDUPTHER

## 2024-08-20 RX ORDER — BUPIVACAINE HYDROCHLORIDE AND EPINEPHRINE 2.5; 5 MG/ML; UG/ML
INJECTION, SOLUTION EPIDURAL; INFILTRATION; INTRACAUDAL; PERINEURAL
Status: COMPLETED | OUTPATIENT
Start: 2024-08-20 | End: 2024-08-20

## 2024-08-20 RX ORDER — CARVEDILOL 12.5 MG/1
12.5 TABLET ORAL 2 TIMES DAILY WITH MEALS
Status: DISCONTINUED | OUTPATIENT
Start: 2024-08-20 | End: 2024-08-23 | Stop reason: HOSPADM

## 2024-08-20 RX ORDER — ACETAMINOPHEN 325 MG/1
650 TABLET ORAL EVERY 6 HOURS
Status: DISCONTINUED | OUTPATIENT
Start: 2024-08-20 | End: 2024-08-23 | Stop reason: HOSPADM

## 2024-08-20 RX ORDER — ONDANSETRON 4 MG/1
4 TABLET, ORALLY DISINTEGRATING ORAL EVERY 8 HOURS PRN
Status: DISCONTINUED | OUTPATIENT
Start: 2024-08-20 | End: 2024-08-23 | Stop reason: HOSPADM

## 2024-08-20 RX ORDER — LORAZEPAM 0.5 MG/1
0.5 TABLET ORAL 2 TIMES DAILY
Status: DISCONTINUED | OUTPATIENT
Start: 2024-08-20 | End: 2024-08-23 | Stop reason: HOSPADM

## 2024-08-20 RX ORDER — BUSPIRONE HYDROCHLORIDE 15 MG/1
15 TABLET ORAL 2 TIMES DAILY
Status: DISCONTINUED | OUTPATIENT
Start: 2024-08-20 | End: 2024-08-23 | Stop reason: HOSPADM

## 2024-08-20 RX ORDER — SODIUM CHLORIDE 0.9 % (FLUSH) 0.9 %
5-40 SYRINGE (ML) INJECTION EVERY 12 HOURS SCHEDULED
Status: DISCONTINUED | OUTPATIENT
Start: 2024-08-20 | End: 2024-08-23 | Stop reason: HOSPADM

## 2024-08-20 RX ORDER — INSULIN LISPRO 100 [IU]/ML
0-4 INJECTION, SOLUTION INTRAVENOUS; SUBCUTANEOUS
Status: DISCONTINUED | OUTPATIENT
Start: 2024-08-20 | End: 2024-08-22

## 2024-08-20 RX ORDER — DULOXETIN HYDROCHLORIDE 60 MG/1
60 CAPSULE, DELAYED RELEASE ORAL DAILY
Status: DISCONTINUED | OUTPATIENT
Start: 2024-08-21 | End: 2024-08-23 | Stop reason: HOSPADM

## 2024-08-20 RX ORDER — NALOXONE HYDROCHLORIDE 0.4 MG/ML
INJECTION, SOLUTION INTRAMUSCULAR; INTRAVENOUS; SUBCUTANEOUS PRN
Status: DISCONTINUED | OUTPATIENT
Start: 2024-08-20 | End: 2024-08-20 | Stop reason: HOSPADM

## 2024-08-20 RX ORDER — PANTOPRAZOLE SODIUM 40 MG/1
40 TABLET, DELAYED RELEASE ORAL
Status: DISCONTINUED | OUTPATIENT
Start: 2024-08-21 | End: 2024-08-23 | Stop reason: HOSPADM

## 2024-08-20 RX ADMIN — ACETAMINOPHEN 1000 MG: 500 TABLET ORAL at 12:20

## 2024-08-20 RX ADMIN — OXYCODONE HYDROCHLORIDE 10 MG: 10 TABLET ORAL at 16:21

## 2024-08-20 RX ADMIN — BUPIVACAINE HYDROCHLORIDE AND EPINEPHRINE BITARTRATE 30 ML: 2.5; .005 INJECTION, SOLUTION EPIDURAL; INFILTRATION; INTRACAUDAL; PERINEURAL at 12:37

## 2024-08-20 RX ADMIN — FENTANYL CITRATE 50 MCG: 50 INJECTION INTRAMUSCULAR; INTRAVENOUS at 14:24

## 2024-08-20 RX ADMIN — HYDROMORPHONE HYDROCHLORIDE 0.5 MG: 1 INJECTION, SOLUTION INTRAMUSCULAR; INTRAVENOUS; SUBCUTANEOUS at 14:30

## 2024-08-20 RX ADMIN — DULOXETINE HYDROCHLORIDE 60 MG: 60 CAPSULE, DELAYED RELEASE ORAL at 12:20

## 2024-08-20 RX ADMIN — ACETAMINOPHEN 325MG 650 MG: 325 TABLET ORAL at 16:21

## 2024-08-20 RX ADMIN — BUSPIRONE HYDROCHLORIDE 15 MG: 15 TABLET ORAL at 20:42

## 2024-08-20 RX ADMIN — SUGAMMADEX 300 MG: 100 INJECTION, SOLUTION INTRAVENOUS at 14:19

## 2024-08-20 RX ADMIN — Medication 100 MCG: at 13:00

## 2024-08-20 RX ADMIN — MIDAZOLAM 2 MG: 1 INJECTION INTRAMUSCULAR; INTRAVENOUS at 12:35

## 2024-08-20 RX ADMIN — ROCURONIUM BROMIDE 10 MG: 10 SOLUTION INTRAVENOUS at 12:58

## 2024-08-20 RX ADMIN — BUPROPION HYDROCHLORIDE 150 MG: 150 TABLET, FILM COATED, EXTENDED RELEASE ORAL at 20:42

## 2024-08-20 RX ADMIN — FENTANYL CITRATE 50 MCG: 0.05 INJECTION, SOLUTION INTRAMUSCULAR; INTRAVENOUS at 14:41

## 2024-08-20 RX ADMIN — INSULIN LISPRO 9 UNITS: 100 INJECTION, SOLUTION INTRAVENOUS; SUBCUTANEOUS at 17:50

## 2024-08-20 RX ADMIN — LIDOCAINE HYDROCHLORIDE 100 MG: 20 INJECTION, SOLUTION EPIDURAL; INFILTRATION; INTRACAUDAL; PERINEURAL at 12:58

## 2024-08-20 RX ADMIN — FENTANYL CITRATE 50 MCG: 50 INJECTION INTRAMUSCULAR; INTRAVENOUS at 12:58

## 2024-08-20 RX ADMIN — CEFAZOLIN 2000 MG: 2 INJECTION, POWDER, FOR SOLUTION INTRAVENOUS at 20:52

## 2024-08-20 RX ADMIN — ATORVASTATIN CALCIUM 10 MG: 10 TABLET, FILM COATED ORAL at 20:42

## 2024-08-20 RX ADMIN — CARVEDILOL 12.5 MG: 12.5 TABLET, FILM COATED ORAL at 16:21

## 2024-08-20 RX ADMIN — SODIUM CHLORIDE: 4.5 INJECTION, SOLUTION INTRAVENOUS at 16:21

## 2024-08-20 RX ADMIN — ROCURONIUM BROMIDE 40 MG: 10 SOLUTION INTRAVENOUS at 13:00

## 2024-08-20 RX ADMIN — LORAZEPAM 0.5 MG: 0.5 TABLET ORAL at 22:30

## 2024-08-20 RX ADMIN — DEXAMETHASONE SODIUM PHOSPHATE 8 MG: 4 INJECTION, SOLUTION INTRAMUSCULAR; INTRAVENOUS at 13:01

## 2024-08-20 RX ADMIN — SODIUM CHLORIDE: 9 INJECTION, SOLUTION INTRAVENOUS at 12:19

## 2024-08-20 RX ADMIN — SODIUM CHLORIDE 2000 MG: 900 INJECTION INTRAVENOUS at 13:01

## 2024-08-20 RX ADMIN — PROPOFOL 230 MG: 10 INJECTION, EMULSION INTRAVENOUS at 12:58

## 2024-08-20 RX ADMIN — BUPIVACAINE HYDROCHLORIDE AND EPINEPHRINE BITARTRATE 30 ML: 2.5; .0091 INJECTION, SOLUTION EPIDURAL; INFILTRATION; INTRACAUDAL; PERINEURAL at 12:46

## 2024-08-20 RX ADMIN — OXYCODONE HYDROCHLORIDE 10 MG: 10 TABLET ORAL at 20:42

## 2024-08-20 RX ADMIN — VASOPRESSIN 1 UNITS: 20 INJECTION PARENTERAL at 13:09

## 2024-08-20 RX ADMIN — ONDANSETRON 4 MG: 2 INJECTION INTRAMUSCULAR; INTRAVENOUS at 13:01

## 2024-08-20 RX ADMIN — VASOPRESSIN 1 UNITS: 20 INJECTION PARENTERAL at 13:24

## 2024-08-20 RX ADMIN — Medication 200 MCG: at 13:03

## 2024-08-20 RX ADMIN — Medication 160 MG: at 12:58

## 2024-08-20 RX ADMIN — ASPIRIN 81 MG: 81 TABLET, COATED ORAL at 20:42

## 2024-08-20 RX ADMIN — ACETAMINOPHEN 325MG 650 MG: 325 TABLET ORAL at 20:42

## 2024-08-20 RX ADMIN — TRANEXAMIC ACID 1950 MG: 650 TABLET ORAL at 12:20

## 2024-08-20 ASSESSMENT — PAIN DESCRIPTION - ONSET
ONSET: ON-GOING

## 2024-08-20 ASSESSMENT — PAIN DESCRIPTION - ORIENTATION
ORIENTATION: LEFT
ORIENTATION: RIGHT

## 2024-08-20 ASSESSMENT — PAIN SCALES - GENERAL
PAINLEVEL_OUTOF10: 6
PAINLEVEL_OUTOF10: 8
PAINLEVEL_OUTOF10: 0
PAINLEVEL_OUTOF10: 9
PAINLEVEL_OUTOF10: 7
PAINLEVEL_OUTOF10: 8
PAINLEVEL_OUTOF10: 6

## 2024-08-20 ASSESSMENT — PAIN DESCRIPTION - PAIN TYPE
TYPE: SURGICAL PAIN

## 2024-08-20 ASSESSMENT — PAIN DESCRIPTION - FREQUENCY
FREQUENCY: CONTINUOUS

## 2024-08-20 ASSESSMENT — PAIN DESCRIPTION - DESCRIPTORS
DESCRIPTORS: ACHING;SORE
DESCRIPTORS: ACHING;SORE
DESCRIPTORS: ACHING;SHARP
DESCRIPTORS: ACHING
DESCRIPTORS: ACHING;SHARP
DESCRIPTORS: ACHING;SHARP

## 2024-08-20 ASSESSMENT — PAIN DESCRIPTION - LOCATION
LOCATION: KNEE

## 2024-08-20 ASSESSMENT — PAIN - FUNCTIONAL ASSESSMENT
PAIN_FUNCTIONAL_ASSESSMENT: PREVENTS OR INTERFERES SOME ACTIVE ACTIVITIES AND ADLS
PAIN_FUNCTIONAL_ASSESSMENT: 0-10
PAIN_FUNCTIONAL_ASSESSMENT: 0-10
PAIN_FUNCTIONAL_ASSESSMENT: ACTIVITIES ARE NOT PREVENTED
PAIN_FUNCTIONAL_ASSESSMENT: ACTIVITIES ARE NOT PREVENTED

## 2024-08-20 ASSESSMENT — ENCOUNTER SYMPTOMS: SHORTNESS OF BREATH: 0

## 2024-08-20 NOTE — CARE COORDINATION
08/20/24 1626   Service Assessment   Patient Orientation Alert and Oriented;Person;Place;Situation   Cognition Alert   History Provided By Patient   Primary Caregiver Self   Accompanied By/Relationship wife   Support Systems Spouse/Significant Other   Patient's Healthcare Decision Maker is: Legal Next of Kin   PCP Verified by CM Yes   Last Visit to PCP Within last 3 months   Prior Functional Level Independent in ADLs/IADLs   Current Functional Level Assistance with the following:;Mobility;Bathing;Dressing;Toileting;Cooking;Housework;Shopping   Can patient return to prior living arrangement Yes   Ability to make needs known: Good   Family able to assist with home care needs: Yes   Would you like for me to discuss the discharge plan with any other family members/significant others, and if so, who? Yes  (wife bedside)   Financial Resources Medicare  (BCBS Medicare)   Community Resources None   CM/SW Referral Other (see comment)  (dc needs)   Social/Functional History   Lives With Spouse   Type of Home House   Discharge Planning   Type of Residence House   Living Arrangements Spouse/Significant Other   Current Services Prior To Admission Durable Medical Equipment   Current DME Prior to Arrival Shower Chair;Other (Comment)  (RTS)   Potential Assistance Needed Durable Medical Equipment;Home Care   Potential DME Needed Walker   DME Ordered? Walker   Potential Assistance Purchasing Medications No   Type of Home Care Services None   Patient expects to be discharged to: House   Services At/After Discharge   Transition of Care Consult (CM Consult) Home Health   Services At/After Discharge DME;Home Health    Resource Information Provided? No   Mode of Transport at Discharge Other (see comment)  (car)   Condition of Participation: Discharge Planning   The Plan for Transition of Care is related to the following treatment goals: home care   The Patient and/or Patient Representative was provided with a Choice of Provider?  Patient   The Patient and/Or Patient Representative agree with the Discharge Plan? Yes   Freedom of Choice list was provided with basic dialogue that supports the patient's individualized plan of care/goals, treatment preferences, and shares the quality data associated with the providers?  Yes     Case Management Assessment  Initial Evaluation    Date/Time of Evaluation: 8/20/2024 4:35 PM  Assessment Completed by: Katiana Martinez    If patient is discharged prior to next notation, then this note serves as note for discharge by case management.    Patient Name: Mark Josue                   YOB: 1945  Diagnosis: Osteoarthritis of left knee [M17.12]  Arthritis of left knee [M17.12]                   Date / Time: 8/20/2024 11:29 AM    Patient Admission Status: Observation   Readmission Risk (Low < 19, Mod (19-27), High > 27): No data recorded  Current PCP: Darius Sow MD  PCP verified by CM? (P) Yes    Chart Reviewed: Yes      History Provided by: (P) Patient  Patient Orientation: (P) Alert and Oriented, Person, Place, Situation    Patient Cognition: (P) Alert    Hospitalization in the last 30 days (Readmission):  No    If yes, Readmission Assessment in  Navigator will be completed.    Advance Directives:      Code Status: Prior   Patient's Primary Decision Maker is: (P) Legal Next of Kin      Discharge Planning:    Patient lives with: (P) Spouse/Significant Other Type of Home: (P) House  Primary Care Giver: (P) Self  Patient Support Systems include: (P) Spouse/Significant Other   Current Financial resources: (P) Medicare (BCBS Medicare)  Current community resources: (P) None  Current services prior to admission: (P) Durable Medical Equipment            Current DME: (P) Shower Chair, Other (Comment) (RTS)            Type of Home Care services:  (P) None    ADLS  Prior functional level: (P) Independent in ADLs/IADLs  Current functional level: (P) Assistance with the following:,  Mobility, Bathing, Dressing, Toileting, Cooking, Housework, Shopping    PT AM-PAC:   /24  OT AM-PAC:   /24    Family can provide assistance at DC: (P) Yes  Would you like Case Management to discuss the discharge plan with any other family members/significant others, and if so, who? (P) Yes (wife bedside)  Plans to Return to Present Housing: (P) Yes  Other Identified Issues/Barriers to RETURNING to current housing: none  Potential Assistance needed at discharge: (P) Durable Medical Equipment, Home Care            Potential DME: (P) Walker  Patient expects to discharge to: (P) House  Plan for transportation at discharge:  home w/ wife who will transport    Financial    Payor: [facility] MEDICARE / Plan: [employer] ESSENTIAL/PLUS / Product Type: *No Product type* /     Does insurance require precert for SNF: n/a    Potential assistance Purchasing Medications: (P) No  Meds-to-Beds request:        [facility]/pharmacy #[id number] - [place], [place] - [address] [facility] [phone] - F [phone]  [address]  [facility] [address]  Phone: [phone] Fax: [phone]    [facility] - [facility] [address] [phone] - F [phone]  [address] [place] [id number]  Phone: [phone] Fax: [phone]    [facility] - [address] - P [phone] - F [phone]  [address] [place] [address]  Phone: [phone] Fax: [phone]      Notes:    Factors facilitating achievement of predicted outcomes: Family support, Cooperative, and Pleasant    Barriers to discharge: none     Additional Case Management Notes:    spoke with patient and confirmed JET Class plan to return home with his wife and the support of home care  Pt will need VA approved home care       Spoke w/ wife Aria at bedside - she states she has secured private duty care through [employer] . She states she works part-time at times and has secured the

## 2024-08-20 NOTE — PROGRESS NOTES
To pacu from OR.  Pt awake, moaning. States pain 9/10 - medicated per crna.  Dressing to left leg dry and intact.  Pedal pulses palpable.  IV infusing.  Monitor in sinus tachycardia with IVCD.

## 2024-08-20 NOTE — PLAN OF CARE
Problem: Discharge Planning  Goal: Discharge to home or other facility with appropriate resources  Outcome: Progressing  Flowsheets (Taken 8/20/2024 1632)  Discharge to home or other facility with appropriate resources:   Identify barriers to discharge with patient and caregiver   Identify discharge learning needs (meds, wound care, etc)   Refer to discharge planning if patient needs post-hospital services based on physician order or complex needs related to functional status, cognitive ability or social support system   Arrange for needed discharge resources and transportation as appropriate     Problem: Chronic Conditions and Co-morbidities  Goal: Patient's chronic conditions and co-morbidity symptoms are monitored and maintained or improved  Outcome: Progressing  Flowsheets (Taken 8/20/2024 1632)  Care Plan - Patient's Chronic Conditions and Co-Morbidity Symptoms are Monitored and Maintained or Improved: Monitor and assess patient's chronic conditions and comorbid symptoms for stability, deterioration, or improvement     Problem: Neurosensory - Adult  Goal: Achieves stable or improved neurological status  Outcome: Progressing  Flowsheets (Taken 8/20/2024 1632)  Achieves stable or improved neurological status: Assess for and report changes in neurological status     Problem: Skin/Tissue Integrity - Adult  Goal: Incisions, wounds, or drain sites healing without S/S of infection  Outcome: Progressing  Flowsheets (Taken 8/20/2024 1632)  Incisions, Wounds, or Drain Sites Healing Without Sign and Symptoms of Infection: ADMISSION and DAILY: Assess and document risk factors for pressure ulcer development     Problem: Musculoskeletal - Adult  Goal: Return mobility to safest level of function  Outcome: Progressing  Flowsheets (Taken 8/20/2024 1632)  Return Mobility to Safest Level of Function: Assess patient stability and activity tolerance for standing, transferring and ambulating with or without assistive devices      Problem: Musculoskeletal - Adult  Goal: Maintain proper alignment of affected body part  Outcome: Progressing     Problem: Musculoskeletal - Adult  Goal: Return ADL status to a safe level of function  Outcome: Progressing     Problem: Infection - Adult  Goal: Absence of infection at discharge  Outcome: Progressing  Flowsheets (Taken 8/20/2024 1632)  Absence of infection at discharge: Assess and monitor for signs and symptoms of infection     Problem: Infection - Adult  Goal: Absence of infection during hospitalization  Outcome: Progressing     Problem: Metabolic/Fluid and Electrolytes - Adult  Goal: Glucose maintained within prescribed range  Outcome: Progressing  Flowsheets (Taken 8/20/2024 1632)  Glucose maintained within prescribed range: Monitor blood glucose as ordered     Problem: Pain  Goal: Verbalizes/displays adequate comfort level or baseline comfort level  Outcome: Progressing  Flowsheets (Taken 8/20/2024 1632)  Verbalizes/displays adequate comfort level or baseline comfort level:   Encourage patient to monitor pain and request assistance   Administer analgesics based on type and severity of pain and evaluate response   Assess pain using appropriate pain scale   Implement non-pharmacological measures as appropriate and evaluate response   Notify Licensed Independent Practitioner if interventions unsuccessful or patient reports new pain     Problem: Safety - Adult  Goal: Free from fall injury  Outcome: Progressing  Flowsheets (Taken 8/20/2024 1632)  Free From Fall Injury: Instruct family/caregiver on patient safety     Problem: ABCDS Injury Assessment  Goal: Absence of physical injury  Outcome: Progressing  Flowsheets (Taken 8/20/2024 1632)  Absence of Physical Injury: Implement safety measures based on patient assessment

## 2024-08-20 NOTE — ANESTHESIA PROCEDURE NOTES
Peripheral Block    Patient location during procedure: PACU  Reason for block: post-op pain management and at surgeon's request  Start time: 8/20/2024 12:37 PM  End time: 8/20/2024 12:47 PM  Staffing  Performed: anesthesiologist and other anesthesia staff   Anesthesiologist: Renetta Deng MD  Performed by: Renetta Deng MD  Authorized by: Renetta Deng MD    Preanesthetic Checklist  Completed: patient identified, IV checked, site marked, risks and benefits discussed, surgical/procedural consents, equipment checked, pre-op evaluation, timeout performed, anesthesia consent given, oxygen available and monitors applied/VS acknowledged  Peripheral Block   Patient position: supine  Prep: ChloraPrep  Provider prep: mask and sterile gloves  Patient monitoring: cardiac monitor, continuous pulse ox, frequent blood pressure checks and IV access  Block type: Femoral  Adductor canal  Laterality: left  Injection technique: single-shot  Guidance: ultrasound guided  Infiltration strength: 1 %    Needle   Needle type: combined needle/nerve stimulator   Needle gauge: 20 G  Needle localization: ultrasound guidance  Needle length: 10 cm  Assessment   Injection assessment: negative aspiration for heme, no paresthesia on injection, local visualized surrounding nerve on ultrasound and no intravascular symptoms  Paresthesia pain: none  Slow fractionated injection: yes  Hemodynamics: stable  Outcomes: uncomplicated and patient tolerated procedure well    Additional Notes  Sartorius and Vastus Medialis Muscle, Femoral artery and Saphenous nerve are identified; the tip of the needle and the spread of the local anesthetic around the Saphenous nerve are visualized. The Saphenous nerve appeared to be anatomically normal and there were no abnormal pathologically findings seen.   Medications Administered  BUPivacaine 0.25%-EPINEPHrine injection [id number] - Perineural   30 mL - 8/20/2024 12:37:00 PM

## 2024-08-20 NOTE — PROGRESS NOTES
Pt sleeping, wakes easily.  States pain 8/10.  Grabiel po ice chips well.  Back to sleep.  Report to Sofia.  To room per bed with RN assist.

## 2024-08-20 NOTE — OP NOTE
Patient: Mark Josue  YOB: 1945  MRN: [id number]    DATE OF PROCEDURE: 8/20/2024      PREOPERATIVE DIAGNOSIS:  Post-traumatic osteoarthritis of the left knee.     POSTOPERATIVE DIAGNOSIS:  Same     OPERATION PERFORMED:  Robotic-assisted left total knee arthroplasty.     SURGEON:  Rory Deng MD     ASSISTANT:  ROXY Morocho    EBL: 100 mL     IMPLANTS:  [place] Triathlon CR femur size 7  [place] Triathlon tibia size 7  11mm CS polyethylene  40mm asymmetric patella     INDICATIONS:  The patient is a 79 y.o. male who presents for left knee replacement.  He had been treated conservatively with anti-inflammatories, physical therapy and intra-articular cortisone injections; none of these gave any significant relief. We discussed total knee arthroplasty. The operative procedure, alternatives, and risks were discussed in detail with the patient.  The risks include but are not limited to: Infection, vessel injury, nerve injury, DVT, pulmonary embolism, implant loosening, need for revision surgery, loss of motion, continued pain. Informed consent for surgery was signed by the patient.     OPERATIVE PROCEDURE:  The patient was seen in the preoperative holding area where the site of surgery was marked and informed consent was confirmed. The patient was brought back to the operating room by OR personnel. General anesthesia was administered. The patient was positioned supine on the operating table. The left lower extremity was then prepped and draped in a standard and sterile fashion. A final and formal timeout was then performed which confirmed the correct patient, correct position, and correct site of surgery. IV antibiotics were administered within 1 hour of the skin incision.     An anterior midline incision was made over the knee. Skin and subcutaneous tissue were divided down to the extensor mechanism. A medial parapatellar arthrotomy was performed. The knee was fully exposed and then two distal  MD  8/20/2024

## 2024-08-20 NOTE — CARE COORDINATION
08/20/24 1624   IMM Letter   Observation Status Letter date given: 08/20/24   Observation Status Letter time given: 1624   Observation Status Letter given to Patient/Family/Significant other/Guardian/POA/by: EVAN expained to pt and copy provided per SANGEETHA Martinez RN     Electronically signed by Katiana Martinez on 8/20/2024 at 4:24 PM  #[phone]

## 2024-08-20 NOTE — PROGRESS NOTES
Physical Therapy  Facility/Department: [address] ORTHOPEDICS  Physical Therapy Initial Assessment  This note serves as patient discharge summary if pt discharges prior to next PT visit      Name: Mark Josue  : 1945  MRN: [id number]  Date of Service: 2024    Discharge Recommendations:  24 hour supervision or assist, Therapy recommended at discharge (2-3)     Mark Josue scored a 16/ on the AM-PAC short mobility form. Current research shows that an AM-PAC score of 18 or greater is typically associated with a discharge to the patient's home setting. Based on the patient's AM-PAC score and their current functional mobility deficits, it is recommended that the patient have 2-3 sessions per week of HOME Physical Therapy at d/c to increase the patient's independence.  Aassessment towards goals.       PT Equipment Recommendations  Equipment Needed: Yes  Mobility Devices: Walker  Walker: Rolling  Other: Patient is 6'4\", 260 pounds.        Past Medical History:  has a past medical history of Anxiety, CAD (coronary artery disease), Depression, GERD (gastroesophageal reflux disease), Hyperlipidemia, Hypertension, TAM on CPAP, Thyroid disease, and Wears glasses.  Past Surgical History:  has a past surgical history that includes Knee arthroscopy (Right); Shoulder arthroscopy (Left, 15 years ago); Cardiac surgery (); Cholecystectomy (18 years ago); Tibia fracture surgery (Left, 2015); Cataract extraction (Bilateral); and Colonoscopy ().    Assessment   Body Structures, Functions, Activity Limitations Requiring Skilled Therapeutic Intervention: Decreased functional mobility ;Decreased ADL status;Decreased ROM;Decreased balance;Increased pain  Assessment: Prior to elective L TKR 24, patient reports living in mobile home with wife, steps to enter, and independence in functional mobility without device. Status 24: Bed Mobility CGA-Min A. Transfers CGA/cues. RW gait 20' with CGA.  Patient

## 2024-08-20 NOTE — PROGRESS NOTES
4 Eyes Skin Assessment     NAME:  Mark Josue  YOB: 1945  MEDICAL RECORD NUMBER:  [id number]    The patient is being assessed for  Admission    I agree that at least one RN has performed a thorough Head to Toe Skin Assessment on the patient. ALL assessment sites listed below have been assessed.      Areas assessed by both nurses:    Head, Face, Ears, Shoulders, Back, Chest, Arms, Elbows, Hands, Sacrum. Buttock, Coccyx, Ischium, Legs. Feet and Heels, and Under Medical Devices         Does the Patient have a Wound? No noted wound(s)       Dick Prevention initiated by RN: Yes  Wound Care Orders initiated by RN: No    Pressure Injury (Stage 3,4, Unstageable, DTI, NWPT, and Complex wounds) if present, place Wound referral order by RN under : No    New Ostomies, if present place, Ostomy referral order under : No     Nurse 1 eSignature: Electronically signed by Sofia Patel RN on 8/20/24 at 3:50 PM EDT    **SHARE this note so that the co-signing nurse can place an eSignature**    Nurse 2 eSignature: Electronically signed by Sujey Yeboah RN on 8/20/24 at 3:53 PM EDT eye

## 2024-08-20 NOTE — ANESTHESIA PRE PROCEDURE
CRT  3. Coronary artery disease - Status post LAD stent in 2004. Last angiogram on November 5, 2016 revealed persistence in patency. No angina  4. Dyslipidemia -continue current regime. Most recent FLP demonstrated LDL=66 2-6-24  7. Suspected Atrial Flutter. 12 lead ECG performed today demonstrated heart rate of 117 bpm. I reviewed ECG with EP and they were unable to distinguish sinus tachycardia and atrial flutter. Holter monitor definitely ruled out atrial flutter and showed presence of sinus tachycardia.     07/09/2024. Study time: 07:33 AM.  Location:  Echo laboratory.     ----------------------------------------------------------------------------   Study Conclusions     - Left ventricle: The cavity size is normal. Wall thickness is normal. Left     ventricular geometry shows evidence of concentric remodeling, with normal     ventricular mass and mildly increased relative wall thickness. Systolic     function is mildly reduced. The estimated ejection fraction is 41-46%.     There is diffuse hypokinesis. Doppler parameters are consistent with     abnormal left ventricular relaxation (grade 1 diastolic dysfunction). The     LVOT stroke index is 26ml/m^2.   - Ventricular septum: Septal motion is paradoxical septal motion consistent     with a conduction abnormality or paced rhythm.   - Aortic valve: The peak systolic gradient is 6mm Hg. The valve area is     2.7cm^2.   - Inferior vena cava: The IVC is normal-sized. Respirophasic diameter     changes are in the normal range (> 50%), consistent with normal central     venous pressure.   - Elevated HR noted during echocardiogram, around 100 BPM.          Neuro/Psych:   (+) psychiatric history:depression/anxiety    (-) seizures and CVA            ROS comment: Takes Ativan at baseline GI/Hepatic/Renal:   (+) GERD: well controlled     (-) liver disease and no renal disease       Endo/Other: Negative Endo/Other ROS       (-) diabetes mellitus,

## 2024-08-20 NOTE — PROGRESS NOTES
Pt arrived to unit at 1533. Pt is alert and oriented X4. Pt oriented to unit and to room. Pt oriented to call light and to phone. White board updated. Pt denies any further needs at this time. Family at bedside. Will continue to monitor and assess.   Electronically signed by Sofia Patel RN on 8/20/2024 at 3:33 PM

## 2024-08-20 NOTE — H&P
Update History & Physical    The patient's History and Physical of August 14, 2024 was reviewed with the patient and I examined the patient. There was no change. The surgical site was confirmed by the patient and me.       Plan: The risks, benefits, expected outcome, and alternative to the recommended procedure have been discussed with the patient. Patient understands and wants to proceed with the procedure.     Electronically signed by KEYLA WATSON MD on 8/20/2024 at 12:45 PM

## 2024-08-20 NOTE — ANESTHESIA POSTPROCEDURE EVALUATION
Department of Anesthesiology  Postprocedure Note    Patient: Mark Josue  MRN: [id number]  YOB: 1945  Date of evaluation: 8/20/2024    Procedure Summary       Date: 08/20/24 Room / Location: [address]    Anesthesia Start: 1249 Anesthesia Stop: 1431    Procedure: LEFT TOTAL KNEE REPLACEMENT WITH  ROBOTIC ASSISTANCE (Left: Knee) Diagnosis:       Osteoarthritis of left knee      (Osteoarthritis of left knee [M17.12])    Surgeons: Rory Deng MD Responsible Provider: Renetta Deng MD    Anesthesia Type: general ASA Status: 3            Anesthesia Type: No value filed.    Geraldine Phase I: Geraldine Score: 8    Geraldine Phase II:      Anesthesia Post Evaluation    Patient location during evaluation: PACU  Level of consciousness: awake and alert  Airway patency: patent  Nausea & Vomiting: no nausea and no vomiting  Cardiovascular status: blood pressure returned to baseline  Respiratory status: acceptable  Hydration status: euvolemic  Comments: Postoperative Anesthesia Note    Name:    Mark Josue  MRN:      [id number]    Patient Vitals in the past 12 hrs:  08/20/24 1519, Temp:97.3 °F (36.3 °C), Pulse:82, Resp:10, SpO2:99 %  08/20/24 1515, BP:(!) 141/79, Pulse:79, Resp:14, SpO2:97 %  08/20/24 1500, BP:(!) 143/80, Pulse:82, Resp:19, SpO2:96 %  08/20/24 1455, Pulse:78, Resp:16, SpO2:97 %  08/20/24 1449, Pulse:97, Resp:27, SpO2:94 %  08/20/24 1445, BP:(!) 138/90, Pulse:95, Resp:12, SpO2:96 %  08/20/24 1440, BP:(!) 141/89, Pulse:100, Resp:18, SpO2:94 %  08/20/24 1435, BP:(!) 142/84, Pulse:97, Resp:17, SpO2:94 %  08/20/24 1430, BP:(!) 156/86, Pulse:(!) 103, Resp:20, SpO2:93 %  08/20/24 1427, BP:(!) 155/94, Temp:97.9 °F (36.6 °C), Temp src:Temporal, Pulse:(!) 103, Resp:21, SpO2:96 %  08/20/24 1245, BP:117/77, Pulse:84, Resp:19, SpO2:97 %  08/20/24 1240, BP:(!) 132/90, Pulse:85, Resp:15, SpO2:98 %  08/20/24 1235, BP:(!) 136/118  08/20/24 1211, BP:(!) 154/105  08/20/24 1205,

## 2024-08-20 NOTE — CARE COORDINATION
Left a message for [facility] home discharge planner ([phone]) regarding patient's need for home care and what process is needed to initiate home care so that is [facility] by the [facility].   Left message that patient will also need a walker.     Left message for Susi alvarado [facility] to see they can provide a rolling walker under patient's VA coverage.   Augustus with [facility] can accept patient if approved by [facility].     Electronically signed by Rachel Guzman, ALANNA, RAYAW, Case Management on 8/20/2024 at 4:34 PM  [place] [phone]

## 2024-08-21 LAB
ANION GAP SERPL CALCULATED.3IONS-SCNC: 11 MMOL/L (ref 3–16)
BUN SERPL-MCNC: 16 MG/DL (ref 7–20)
CALCIUM SERPL-MCNC: 8.4 MG/DL (ref 8.3–10.6)
CHLORIDE SERPL-SCNC: 103 MMOL/L (ref 99–110)
CO2 SERPL-SCNC: 21 MMOL/L (ref 21–32)
CREAT SERPL-MCNC: 1 MG/DL (ref 0.8–1.3)
GFR SERPLBLD CREATININE-BSD FMLA CKD-EPI: 78 ML/MIN/{1.73_M2}
GLUCOSE BLD-MCNC: 130 MG/DL (ref 70–99)
GLUCOSE BLD-MCNC: 133 MG/DL (ref 70–99)
GLUCOSE BLD-MCNC: 145 MG/DL (ref 70–99)
GLUCOSE BLD-MCNC: 148 MG/DL (ref 70–99)
GLUCOSE SERPL-MCNC: 143 MG/DL (ref 70–99)
PERFORMED ON: ABNORMAL
POTASSIUM SERPL-SCNC: 4.2 MMOL/L (ref 3.5–5.1)
SODIUM SERPL-SCNC: 135 MMOL/L (ref 136–145)

## 2024-08-21 PROCEDURE — 80048 BASIC METABOLIC PNL TOTAL CA: CPT

## 2024-08-21 PROCEDURE — 94660 CPAP INITIATION&MGMT: CPT

## 2024-08-21 PROCEDURE — 36415 COLL VENOUS BLD VENIPUNCTURE: CPT

## 2024-08-21 PROCEDURE — 2580000003 HC RX 258: Performed by: ORTHOPAEDIC SURGERY

## 2024-08-21 PROCEDURE — G0378 HOSPITAL OBSERVATION PER HR: HCPCS

## 2024-08-21 PROCEDURE — 6360000002 HC RX W HCPCS: Performed by: ORTHOPAEDIC SURGERY

## 2024-08-21 PROCEDURE — 97110 THERAPEUTIC EXERCISES: CPT

## 2024-08-21 PROCEDURE — 97530 THERAPEUTIC ACTIVITIES: CPT

## 2024-08-21 PROCEDURE — 6370000000 HC RX 637 (ALT 250 FOR IP): Performed by: ORTHOPAEDIC SURGERY

## 2024-08-21 PROCEDURE — 94760 N-INVAS EAR/PLS OXIMETRY 1: CPT

## 2024-08-21 PROCEDURE — 96365 THER/PROPH/DIAG IV INF INIT: CPT

## 2024-08-21 PROCEDURE — 97166 OT EVAL MOD COMPLEX 45 MIN: CPT

## 2024-08-21 PROCEDURE — 97535 SELF CARE MNGMENT TRAINING: CPT

## 2024-08-21 RX ORDER — OXYCODONE HYDROCHLORIDE 5 MG/1
5-10 TABLET ORAL EVERY 6 HOURS PRN
Qty: 40 TABLET | Refills: 0 | Status: ON HOLD | OUTPATIENT
Start: 2024-08-21 | End: 2024-08-26

## 2024-08-21 RX ORDER — ASPIRIN 81 MG/1
81 TABLET ORAL 2 TIMES DAILY
Qty: 28 TABLET | Refills: 0 | Status: ON HOLD | OUTPATIENT
Start: 2024-08-21 | End: 2024-09-04

## 2024-08-21 RX ADMIN — OXYCODONE HYDROCHLORIDE 10 MG: 10 TABLET ORAL at 09:30

## 2024-08-21 RX ADMIN — ASPIRIN 81 MG: 81 TABLET, COATED ORAL at 09:30

## 2024-08-21 RX ADMIN — LEVOTHYROXINE SODIUM 125 MCG: 0.12 TABLET ORAL at 05:20

## 2024-08-21 RX ADMIN — LORAZEPAM 0.5 MG: 0.5 TABLET ORAL at 09:30

## 2024-08-21 RX ADMIN — CARVEDILOL 12.5 MG: 12.5 TABLET, FILM COATED ORAL at 09:30

## 2024-08-21 RX ADMIN — PANTOPRAZOLE SODIUM 40 MG: 40 TABLET, DELAYED RELEASE ORAL at 05:20

## 2024-08-21 RX ADMIN — BUPROPION HYDROCHLORIDE 150 MG: 150 TABLET, FILM COATED, EXTENDED RELEASE ORAL at 20:44

## 2024-08-21 RX ADMIN — BUSPIRONE HYDROCHLORIDE 15 MG: 15 TABLET ORAL at 09:30

## 2024-08-21 RX ADMIN — ASPIRIN 81 MG: 81 TABLET, COATED ORAL at 20:45

## 2024-08-21 RX ADMIN — CARVEDILOL 12.5 MG: 12.5 TABLET, FILM COATED ORAL at 16:44

## 2024-08-21 RX ADMIN — ACETAMINOPHEN 325MG 650 MG: 325 TABLET ORAL at 09:30

## 2024-08-21 RX ADMIN — SACUBITRIL AND VALSARTAN 1 TABLET: 49; 51 TABLET, FILM COATED ORAL at 09:30

## 2024-08-21 RX ADMIN — SODIUM CHLORIDE, PRESERVATIVE FREE 10 ML: 5 INJECTION INTRAVENOUS at 09:34

## 2024-08-21 RX ADMIN — CEFAZOLIN 2000 MG: 2 INJECTION, POWDER, FOR SOLUTION INTRAVENOUS at 05:40

## 2024-08-21 RX ADMIN — ACETAMINOPHEN 325MG 650 MG: 325 TABLET ORAL at 05:20

## 2024-08-21 RX ADMIN — DULOXETINE HYDROCHLORIDE 60 MG: 60 CAPSULE, DELAYED RELEASE ORAL at 09:30

## 2024-08-21 RX ADMIN — OXYCODONE HYDROCHLORIDE 10 MG: 10 TABLET ORAL at 16:44

## 2024-08-21 RX ADMIN — ACETAMINOPHEN 325MG 650 MG: 325 TABLET ORAL at 16:44

## 2024-08-21 RX ADMIN — OXYCODONE HYDROCHLORIDE 10 MG: 10 TABLET ORAL at 13:43

## 2024-08-21 RX ADMIN — OXYCODONE HYDROCHLORIDE 10 MG: 10 TABLET ORAL at 20:45

## 2024-08-21 RX ADMIN — BUSPIRONE HYDROCHLORIDE 15 MG: 15 TABLET ORAL at 20:44

## 2024-08-21 RX ADMIN — LORAZEPAM 0.5 MG: 0.5 TABLET ORAL at 20:44

## 2024-08-21 RX ADMIN — ATORVASTATIN CALCIUM 10 MG: 10 TABLET, FILM COATED ORAL at 20:44

## 2024-08-21 RX ADMIN — BUPROPION HYDROCHLORIDE 150 MG: 150 TABLET, FILM COATED, EXTENDED RELEASE ORAL at 09:30

## 2024-08-21 RX ADMIN — SODIUM CHLORIDE, PRESERVATIVE FREE 10 ML: 5 INJECTION INTRAVENOUS at 20:46

## 2024-08-21 RX ADMIN — OXYCODONE HYDROCHLORIDE 10 MG: 10 TABLET ORAL at 05:20

## 2024-08-21 RX ADMIN — ACETAMINOPHEN 325MG 650 MG: 325 TABLET ORAL at 20:45

## 2024-08-21 ASSESSMENT — PAIN DESCRIPTION - LOCATION
LOCATION: KNEE
LOCATION: KNEE

## 2024-08-21 ASSESSMENT — PAIN - FUNCTIONAL ASSESSMENT
PAIN_FUNCTIONAL_ASSESSMENT: ACTIVITIES ARE NOT PREVENTED
PAIN_FUNCTIONAL_ASSESSMENT: ACTIVITIES ARE NOT PREVENTED

## 2024-08-21 ASSESSMENT — PAIN DESCRIPTION - PAIN TYPE
TYPE: SURGICAL PAIN
TYPE: SURGICAL PAIN

## 2024-08-21 ASSESSMENT — PAIN DESCRIPTION - ORIENTATION
ORIENTATION: LEFT
ORIENTATION: LEFT

## 2024-08-21 ASSESSMENT — PAIN SCALES - GENERAL
PAINLEVEL_OUTOF10: 9
PAINLEVEL_OUTOF10: 8
PAINLEVEL_OUTOF10: 5
PAINLEVEL_OUTOF10: 9
PAINLEVEL_OUTOF10: 7
PAINLEVEL_OUTOF10: 8
PAINLEVEL_OUTOF10: 9

## 2024-08-21 ASSESSMENT — PAIN DESCRIPTION - FREQUENCY
FREQUENCY: CONTINUOUS
FREQUENCY: CONTINUOUS

## 2024-08-21 ASSESSMENT — PAIN DESCRIPTION - DESCRIPTORS
DESCRIPTORS: ACHING
DESCRIPTORS: ACHING

## 2024-08-21 ASSESSMENT — PAIN SCALES - WONG BAKER
WONGBAKER_NUMERICALRESPONSE: NO HURT
WONGBAKER_NUMERICALRESPONSE: HURTS LITTLE MORE

## 2024-08-21 ASSESSMENT — PAIN DESCRIPTION - ONSET
ONSET: ON-GOING
ONSET: ON-GOING

## 2024-08-21 NOTE — PLAN OF CARE
Problem: Discharge Planning  Goal: Discharge to home or other facility with appropriate resources  8/21/2024 1043 by Edilia Mueller RN  Outcome: Progressing  8/20/2024 2255 by Frances De La Vega RN  Outcome: Progressing  Flowsheets (Taken 8/20/2024 1632 by Sofia Patel RN)  Discharge to home or other facility with appropriate resources:   Identify barriers to discharge with patient and caregiver   Identify discharge learning needs (meds, wound care, etc)   Refer to discharge planning if patient needs post-hospital services based on physician order or complex needs related to functional status, cognitive ability or social support system   Arrange for needed discharge resources and transportation as appropriate     Problem: Chronic Conditions and Co-morbidities  Goal: Patient's chronic conditions and co-morbidity symptoms are monitored and maintained or improved  8/21/2024 1043 by Edilia Mueller RN  Outcome: Progressing  8/20/2024 2255 by Frances De La Vega RN  Outcome: Progressing  Flowsheets (Taken 8/20/2024 1632 by Sofia Patel RN)  Care Plan - Patient's Chronic Conditions and Co-Morbidity Symptoms are Monitored and Maintained or Improved: Monitor and assess patient's chronic conditions and comorbid symptoms for stability, deterioration, or improvement     Problem: Neurosensory - Adult  Goal: Achieves stable or improved neurological status  8/21/2024 1043 by Edilia Mueller RN  Outcome: Progressing  8/20/2024 2255 by Frances De La Vega RN  Outcome: Progressing  Flowsheets (Taken 8/20/2024 1632 by Sofia Patel RN)  Achieves stable or improved neurological status: Assess for and report changes in neurological status     Problem: Skin/Tissue Integrity - Adult  Goal: Incisions, wounds, or drain sites healing without S/S of infection  8/21/2024 1043 by Edilia Mueller RN  Outcome: Progressing  8/20/2024 2255 by Frances De La Vega RN  Outcome: Progressing  Flowsheets (Taken 8/20/2024 1632 by Sofia Patel

## 2024-08-21 NOTE — PROGRESS NOTES
[facility] Orthopedic Surgery   Progress Note      S/P :  SUBJECTIVE  in bed. Alert and oriented,. Family at bedside. . Pain is   described in left knee and with the intensity of severe. Pain is described as aching. States tried to stand up this AM and too painful He had last dose pain med orally at 520am.       OBJECTIVE              Physical                      VITALS:  /88   Pulse 96   Temp 97.4 °F (36.3 °C) (Oral)   Resp 18   Ht 1.93 m (6' 4\")   Wt 117.9 kg (260 lb)   SpO2 94%   BMI 31.65 kg/m²                     MUSCULOSKELETAL:  left foot NVI. Wiggles toes to command. Able to plantarflex and dorsiflex ankle Pedal pulses are palpable.                    NEUROLOGIC:                                  Sensory:  Touch:  Left Lower Extremity:  normal                                                 Surgical wound appears clean and dry left knee with Prineo dressing Ice pad on. GRAHAM hose on.     Data       CBC:   Lab Results   Component Value Date/Time    WBC 8.7 08/12/2024 11:15 AM    RBC 4.70 08/12/2024 11:15 AM    HGB 15.9 08/12/2024 11:15 AM    HCT 45.8 08/12/2024 11:15 AM    MCV 97.3 08/12/2024 11:15 AM    MCH 33.8 08/12/2024 11:15 AM    MCHC 34.7 08/12/2024 11:15 AM    RDW 15.1 08/12/2024 11:15 AM     08/12/2024 11:15 AM    MPV 8.9 08/12/2024 11:15 AM        WBC:    Lab Results   Component Value Date/Time    WBC 8.7 08/12/2024 11:15 AM        Hemoglobin/Hematocrit:    Lab Results   Component Value Date/Time    HGB 15.9 08/12/2024 11:15 AM    HCT 45.8 08/12/2024 11:15 AM        PT/INR:    Lab Results   Component Value Date/Time    PROTIME 14.9 08/12/2024 11:15 AM    INR 1.15 08/12/2024 11:15 AM              Current Inpatient Medications             Current Facility-Administered Medications: buPROPion (WELLBUTRIN SR) extended release tablet 150 mg, 150 mg, Oral, BID  busPIRone (BUSPAR) tablet 15 mg, 15 mg, Oral, BID  carvedilol (COREG) tablet 12.5 mg, 12.5 mg, Oral, BID WC  levothyroxine (SYNTHROID)  tablet 125 mcg, 125 mcg, Oral, Daily  LORazepam (ATIVAN) tablet 0.5 mg, 0.5 mg, Oral, BID  pantoprazole (PROTONIX) tablet 40 mg, 40 mg, Oral, QAM AC  sacubitril-valsartan (ENTRESTO) 49-51 MG per tablet 1 tablet, 1 tablet, Oral, BID  atorvastatin (LIPITOR) tablet 10 mg, 10 mg, Oral, Nightly  insulin glargine (LANTUS) injection vial 29 Units, 0.25 Units/kg, SubCUTAneous, Nightly  insulin lispro (HUMALOG,ADMELOG) injection vial 9 Units, 0.08 Units/kg, SubCUTAneous, TID WC  insulin lispro (HUMALOG,ADMELOG) injection vial 0-4 Units, 0-4 Units, SubCUTAneous, TID WC  insulin lispro (HUMALOG,ADMELOG) injection vial 0-4 Units, 0-4 Units, SubCUTAneous, Nightly  glucose chewable tablet 16 g, 4 tablet, Oral, PRN  dextrose bolus 10% 125 mL, 125 mL, IntraVENous, PRN **OR** dextrose bolus 10% 250 mL, 250 mL, IntraVENous, PRN  glucagon injection 1 mg, 1 mg, SubCUTAneous, PRN  dextrose 10 % infusion, , IntraVENous, Continuous PRN  0.45 % sodium chloride infusion, , IntraVENous, Continuous  sodium chloride flush 0.9 % injection 5-40 mL, 5-40 mL, IntraVENous, 2 times per day  sodium chloride flush 0.9 % injection 5-40 mL, 5-40 mL, IntraVENous, PRN  0.9 % sodium chloride infusion, , IntraVENous, PRN  acetaminophen (TYLENOL) tablet 650 mg, 650 mg, Oral, Q6H  oxyCODONE (ROXICODONE) immediate release tablet 5 mg, 5 mg, Oral, Q4H PRN **OR** oxyCODONE HCl (OXY-IR) immediate release tablet 10 mg, 10 mg, Oral, Q4H PRN  morphine (PF) injection 2 mg, 2 mg, IntraVENous, Q3H PRN **OR** morphine (PF) injection 4 mg, 4 mg, IntraVENous, Q3H PRN  ondansetron (ZOFRAN-ODT) disintegrating tablet 4 mg, 4 mg, Oral, Q8H PRN **OR** ondansetron (ZOFRAN) injection 4 mg, 4 mg, IntraVENous, Q6H PRN  polyethylene glycol (GLYCOLAX) packet 17 g, 17 g, Oral, Daily PRN  aspirin EC tablet 81 mg, 81 mg, Oral, BID  calcium carbonate (TUMS) chewable tablet 500 mg, 500 mg, Oral, TID PRN  DULoxetine (CYMBALTA) extended release capsule 60 mg, 60 mg, Oral,

## 2024-08-21 NOTE — PLAN OF CARE
Problem: Discharge Planning  Goal: Discharge to home or other facility with appropriate resources  8/20/2024 2255 by Frances De La Vega, RN  Outcome: Progressing  Flowsheets (Taken 8/20/2024 1632 by Sofia Patel RN)  Discharge to home or other facility with appropriate resources:   Identify barriers to discharge with patient and caregiver   Identify discharge learning needs (meds, wound care, etc)   Refer to discharge planning if patient needs post-hospital services based on physician order or complex needs related to functional status, cognitive ability or social support system   Arrange for needed discharge resources and transportation as appropriate  8/20/2024 1632 by Sofia Patel RN  Outcome: Progressing  Flowsheets (Taken 8/20/2024 1632)  Discharge to home or other facility with appropriate resources:   Identify barriers to discharge with patient and caregiver   Identify discharge learning needs (meds, wound care, etc)   Refer to discharge planning if patient needs post-hospital services based on physician order or complex needs related to functional status, cognitive ability or social support system   Arrange for needed discharge resources and transportation as appropriate     Problem: Chronic Conditions and Co-morbidities  Goal: Patient's chronic conditions and co-morbidity symptoms are monitored and maintained or improved  8/20/2024 2255 by Frances De La Vega RN  Outcome: Progressing  Flowsheets (Taken 8/20/2024 1632 by Sofia Patel, RN)  Care Plan - Patient's Chronic Conditions and Co-Morbidity Symptoms are Monitored and Maintained or Improved: Monitor and assess patient's chronic conditions and comorbid symptoms for stability, deterioration, or improvement  8/20/2024 1632 by Sofia Patel RN  Outcome: Progressing  Flowsheets (Taken 8/20/2024 1632)  Care Plan - Patient's Chronic Conditions and Co-Morbidity Symptoms are Monitored and Maintained or Improved: Monitor and assess patient's chronic  part  8/20/2024 2255 by Frances De La Vega RN  Outcome: Progressing  Flowsheets (Taken 8/20/2024 2255)  Maintain proper alignment of affected body part: Support and protect limb and body alignment per provider's orders  8/20/2024 1632 by Sofia Patel RN  Outcome: Progressing  Goal: Return ADL status to a safe level of function  8/20/2024 2255 by Frances De La Vega RN  Outcome: Progressing  Flowsheets (Taken 8/20/2024 2255)  Return ADL Status to a Safe Level of Function: Administer medication as ordered  8/20/2024 1632 by Sofia Patel RN  Outcome: Progressing     Problem: Infection - Adult  Goal: Absence of infection at discharge  8/20/2024 2255 by Frances De La Vega RN  Outcome: Progressing  Flowsheets (Taken 8/20/2024 1632 by Sofia Patel RN)  Absence of infection at discharge: Assess and monitor for signs and symptoms of infection  8/20/2024 1632 by Sofia Patel RN  Outcome: Progressing  Flowsheets (Taken 8/20/2024 1632)  Absence of infection at discharge: Assess and monitor for signs and symptoms of infection  Goal: Absence of infection during hospitalization  8/20/2024 2255 by Frances De La Vega RN  Outcome: Progressing  Flowsheets (Taken 8/20/2024 2255)  Absence of infection during hospitalization:   Assess and monitor for signs and symptoms of infection   Monitor endotracheal (as able) and nasal secretions for changes in amount and color   Monitor lab/diagnostic results   Monitor all insertion sites i.e., indwelling lines, tubes and drains  8/20/2024 1632 by Sofia Patel RN  Outcome: Progressing     Problem: Metabolic/Fluid and Electrolytes - Adult  Goal: Glucose maintained within prescribed range  8/20/2024 2255 by Frances De La Vega RN  Outcome: Progressing  Flowsheets (Taken 8/20/2024 1632 by Sofia Patel RN)  Glucose maintained within prescribed range: Monitor blood glucose as ordered  8/20/2024 1632 by Sofia Patel RN  Outcome: Progressing  Flowsheets (Taken 8/20/2024 1632)  Glucose maintained  within prescribed range: Monitor blood glucose as ordered     Problem: Pain  Goal: Verbalizes/displays adequate comfort level or baseline comfort level  8/20/2024 2255 by Frances De La Vega RN  Outcome: Progressing  Flowsheets (Taken 8/20/2024 1632 by Sofia Patel RN)  Verbalizes/displays adequate comfort level or baseline comfort level:   Encourage patient to monitor pain and request assistance   Administer analgesics based on type and severity of pain and evaluate response   Assess pain using appropriate pain scale   Implement non-pharmacological measures as appropriate and evaluate response   Notify Licensed Independent Practitioner if interventions unsuccessful or patient reports new pain  8/20/2024 1632 by Sofia Patel RN  Outcome: Progressing  Flowsheets (Taken 8/20/2024 1632)  Verbalizes/displays adequate comfort level or baseline comfort level:   Encourage patient to monitor pain and request assistance   Administer analgesics based on type and severity of pain and evaluate response   Assess pain using appropriate pain scale   Implement non-pharmacological measures as appropriate and evaluate response   Notify Licensed Independent Practitioner if interventions unsuccessful or patient reports new pain     Problem: Safety - Adult  Goal: Free from fall injury  8/20/2024 2255 by Frances D eLa Vega RN  Outcome: Progressing  Flowsheets (Taken 8/20/2024 1632 by Sofia Patel RN)  Free From Fall Injury: Instruct family/caregiver on patient safety  8/20/2024 1632 by Sofia Patel RN  Outcome: Progressing  Flowsheets (Taken 8/20/2024 1632)  Free From Fall Injury: Instruct family/caregiver on patient safety     Problem: ABCDS Injury Assessment  Goal: Absence of physical injury  8/20/2024 2255 by Frances De La Vega RN  Outcome: Progressing  Flowsheets (Taken 8/20/2024 1632 by Sofia Patel RN)  Absence of Physical Injury: Implement safety measures based on patient assessment  8/20/2024 1632 by Sofia Patel

## 2024-08-21 NOTE — PROGRESS NOTES
Pt did not pass PT to go home today due to severe pain in knee with any activity. Encouraged to take Ativan as used at home as this will provide muscle pain relief in addition to Oxycodone. Will try to DC home tomorrow or he may need ECF.

## 2024-08-21 NOTE — PLAN OF CARE
Problem: Discharge Planning  Goal: Discharge to home or other facility with appropriate resources  8/21/2024 1044 by Edilia Mueller RN  Outcome: Progressing  8/21/2024 1043 by Edilia Mueller RN  Outcome: Progressing  8/20/2024 2255 by Frances De La Vega, RN  Outcome: Progressing  Flowsheets (Taken 8/20/2024 1632 by Sofia Patel, RN)  Discharge to home or other facility with appropriate resources:   Identify barriers to discharge with patient and caregiver   Identify discharge learning needs (meds, wound care, etc)   Refer to discharge planning if patient needs post-hospital services based on physician order or complex needs related to functional status, cognitive ability or social support system   Arrange for needed discharge resources and transportation as appropriate     Problem: Chronic Conditions and Co-morbidities  Goal: Patient's chronic conditions and co-morbidity symptoms are monitored and maintained or improved  8/21/2024 1044 by Edilia Mueller RN  Outcome: Progressing  Flowsheets (Taken 8/21/2024 1044)  Care Plan - Patient's Chronic Conditions and Co-Morbidity Symptoms are Monitored and Maintained or Improved:   Monitor and assess patient's chronic conditions and comorbid symptoms for stability, deterioration, or improvement   Collaborate with multidisciplinary team to address chronic and comorbid conditions and prevent exacerbation or deterioration   Update acute care plan with appropriate goals if chronic or comorbid symptoms are exacerbated and prevent overall improvement and discharge  8/21/2024 1043 by Edilia Mueller RN  Outcome: Progressing  8/20/2024 2255 by Frances De La Vega, RN  Outcome: Progressing  Flowsheets (Taken 8/20/2024 1632 by Sofia Patel, RN)  Care Plan - Patient's Chronic Conditions and Co-Morbidity Symptoms are Monitored and Maintained or Improved: Monitor and assess patient's chronic conditions and comorbid symptoms for stability, deterioration, or improvement     Problem:  adequate comfort level or baseline comfort level:   Encourage patient to monitor pain and request assistance   Administer analgesics based on type and severity of pain and evaluate response   Assess pain using appropriate pain scale   Implement non-pharmacological measures as appropriate and evaluate response   Notify Licensed Independent Practitioner if interventions unsuccessful or patient reports new pain     Problem: Safety - Adult  Goal: Free from fall injury  8/21/2024 1044 by Edilia Mueller RN  Outcome: Progressing  8/21/2024 1043 by Edilia Mueller RN  Outcome: Progressing  8/20/2024 2255 by Frances De La Vega RN  Outcome: Progressing  Flowsheets (Taken 8/20/2024 1632 by Sofia Patel, RN)  Free From Fall Injury: Instruct family/caregiver on patient safety     Problem: ABCDS Injury Assessment  Goal: Absence of physical injury  8/21/2024 1044 by Edilia Mueller RN  Outcome: Progressing  8/21/2024 1043 by Edilia Mueller RN  Outcome: Progressing  8/20/2024 2255 by Frances De La Vega RN  Outcome: Progressing  Flowsheets (Taken 8/20/2024 1632 by Sofia Patel RN)  Absence of Physical Injury: Implement safety measures based on patient assessment

## 2024-08-21 NOTE — PROGRESS NOTES
Occupational Therapy  Facility/Department: [address] ORTHOPEDICS  Occupational Therapy Initial Assessment    Name: Mark Josue  : 1945  MRN: [id number]  Date of Service: 2024    Discharge Recommendations:  Patient would benefit from continued therapy after discharge, 2-3 sessions per week, 24 hour supervision or assist, Continue to assess pending progress  OT Equipment Recommendations  Equipment Needed: No     Mark Josue scored a 18/24 on the AM-PAC ADL Inpatient form. Current research shows that an AM-PAC score of 18 or greater is typically associated with a discharge to the patient's home setting. Based on the patient's AM-PAC score, and their current ADL deficits, it is recommended that the patient have 2-3 sessions per week of Occupational Therapy at d/c to increase the patient's independence.  At this time, this patient demonstrates the endurance and safety to discharge home with initial 24/7 assist and a follow up treatment frequency of 2-3x/wk.   Please see assessment section for further patient specific details.    If patient discharges prior to next session this note will serve as a discharge summary.  Please see below for the latest assessment towards goals.      Patient Diagnosis(es): The encounter diagnosis was Arthritis of left knee.  Past Medical History:  has a past medical history of Anxiety, CAD (coronary artery disease), Depression, GERD (gastroesophageal reflux disease), Hyperlipidemia, Hypertension, TAM on CPAP, Thyroid disease, and Wears glasses.  Past Surgical History:  has a past surgical history that includes Knee arthroscopy (Right); Shoulder arthroscopy (Left, 15 years ago); Cardiac surgery (); Cholecystectomy (18 years ago); Tibia fracture surgery (Left, 2015); Cataract extraction (Bilateral); Colonoscopy (); and Total knee arthroplasty (Left, 2024).           Assessment   Performance deficits / Impairments: Decreased endurance;Decreased functional  Hard of hearing/hearing concerns (has hearing aides; doesn't wear)    Cognition  Overall Cognitive Status: WFL  Orientation  Overall Orientation Status: Within Functional Limits                 Static Sitting Balance: seated EOB SBA/sup  Dynamic Sitting Balance: seated EOB throughout dressing tasks SBA  Static Standing Balance: in stance at RW CGA  Dynamic Standing Balance: in stance at RW CGA/min A throughout dressing tasks/toileting    Education Given To: Patient;Family  Education Provided: Role of Therapy;Plan of Care;Precautions;ADL Adaptive Strategies;Fall Prevention Strategies;Transfer Training;Mobility Training  Education Provided Comments: LLE WBAT, safe tx technique/walker safety, lenore hose wearing schedule, seated bathing/dressing for safety, LB dressing technique, importance of ice/elevation/hourly mobility when awake, positioning of LLE at rest, plan of care  Education Method: Verbal  Barriers to Learning: None  Education Outcome: Verbalized understanding;Demonstrated understanding;Continued education needed                                 AM-PAC - ADL  AM-PAC Daily Activity - Inpatient   How much help is needed for putting on and taking off regular lower body clothing?: A Lot  How much help is needed for bathing (which includes washing, rinsing, drying)?: A Little  How much help is needed for toileting (which includes using toilet, bedpan, or urinal)?: A Little  How much help is needed for putting on and taking off regular upper body clothing?: A Little  How much help is needed for taking care of personal grooming?: A Little  How much help for eating meals?: None  AM-[facility] Inpatient Daily Activity Raw Score: 18  AM-PAC Inpatient ADL T-Scale Score : 38.66  ADL Inpatient CMS 0-100% Score: 46.65  ADL Inpatient CMS G-Code Modifier : CK      Goals  Short Term Goals  Time Frame for Short Term Goals: Prior to D/C  Short Term Goal 1: Pt will complete bed mobility sup  Short Term Goal 2: Pt will complete functional

## 2024-08-21 NOTE — PROGRESS NOTES
Orders completed this shift:      [x] Surgical site and Neurovascular checks assessed with head to toe assessment and Q4Hr this shift per orders. See flowsheets for documentation.   [x] Insulin protocol implemented per orders, see eMAR for documentation.  [x] Patient ambulated 20 feet from bed to toilet and back to bed . See flowsheet for documentation on how patient tolerated ambulation.  [x] Ice pack/pad in place to surgical site. Barrier in place between patient skin and ice pack/pad.   [x]Pain medication administered per orders for management of pain. See eMAR for documentation.   [x] Incentive spirometer performed by patient. Volume achieved 2500. Encouraged patient to perform Q2hr while awake per order.  [x] IV fluids stopped per orders as patient is tolerating PO and has adequate urine output. See eMAR for documentation.   [x] Shift intake and output documented per shift, see flowsheet.  [x] Customized care plan and education charted on Qshift.

## 2024-08-21 NOTE — PROGRESS NOTES
Physical Therapy  Facility/Department: [address] ORTHOPEDICS  Daily Treatment Note    This note serves as patient discharge summary if pt discharges prior to next PT visit      Name: Mark Josue  : 1945  MRN: [id number]  Date of Service: 2024    Discharge Recommendations:  24 hour supervision or assist, Therapy recommended at discharge (2-3)   Mark Josue scored a 16/24 (at 24 session; exs only 24) on the AM-PAC short mobility form. Current research shows that an AM-PAC score of 18 or greater is typically associated with a discharge to the patient's home setting. Based on the patient's AM-PAC score and their current functional mobility deficits, it is recommended that the patient have 2-3 sessions per week of HOME Physical Therapy at d/c to increase the patient's independence.      PT Equipment Recommendations  Equipment Needed: Yes  Mobility Devices: Walker  Walker: Rolling  Other: Patient is 6'4\", 260 pounds.      Patient Diagnosis(es): The encounter diagnosis was Arthritis of left knee.  Past Medical History:  has a past medical history of Anxiety, CAD (coronary artery disease), Depression, GERD (gastroesophageal reflux disease), Hyperlipidemia, Hypertension, TAM on CPAP, Thyroid disease, and Wears glasses.  Past Surgical History:  has a past surgical history that includes Knee arthroscopy (Right); Shoulder arthroscopy (Left, 15 years ago); Cardiac surgery (); Cholecystectomy (18 years ago); Tibia fracture surgery (Left, 2015); Cataract extraction (Bilateral); Colonoscopy (); and Total knee arthroplasty (Left, 2024).    Assessment   Body Structures, Functions, Activity Limitations Requiring Skilled Therapeutic Intervention: Decreased functional mobility ;Decreased ADL status;Decreased ROM;Decreased balance;Increased pain  Assessment: Prior to elective L TKR 24, patient reports living in mobile home with wife, steps to enter, and independence in functional

## 2024-08-21 NOTE — PROGRESS NOTES
CLINICAL PHARMACY NOTE: MEDS TO BEDS    Total # of Prescriptions Filled: 2   The following medications were delivered to the patient:  Current Discharge Medication List        START taking these medications    Details   oxyCODONE (ROXICODONE) 5 MG immediate release tablet Take 1-2 tablets by mouth every 6 hours as needed for Pain for up to 5 days. Max Daily Amount: 40 mg  Qty: 40 tablet, Refills: 0    Comments: Reduce doses taken as pain becomes manageable  Associated Diagnoses: Arthritis of left knee      aspirin 81 MG EC tablet Take 1 tablet by mouth in the morning and at bedtime for 14 days  Qty: 28 tablet, Refills: 0               Additional Documentation:

## 2024-08-21 NOTE — PROGRESS NOTES
[address] [address]  Dept: [phone]  Loc: [phone]  eMERGENCYdEPARTMENT eNCOUnter      Pt Name: Cozette Hodgkins  MRN: [id number]  [place] 1976  Date of evaluation: 7/3/2020  Provider:Opal Thibodeaux PA-C    CHIEF COMPLAINT       Chief Complaint   Patient presents with    Back Pain     had a large can fall on her right shoulder       HISTORY OF PRESENT ILLNESS  (Location/Symptom, Timing/Onset, Context/Setting, Quality, Duration,Modifying Factors, Severity.)   Cozette Hodgkins is a 41 y.o. female who presents to the emergency department by private vehicle complaining of right shoulder injury. Patient states a large can of corn fell on the top of her right shoulder shortly prior to arrival.  Patient is had pain in her right shoulder since. States she has shooting pain down into her hand. Pain rates a 10/10. All pain worsens with movement. Denies any weakness in right upper extremity. No other injury sustained. No other complaints this time. Nursing Notes were reviewedand agreed with or any disagreements were addressed in the HPI. REVIEW OF SYSTEMS    (2-9 systems for level 4, 10 or more for level 5)     Review of Systems   Constitutional: Negative for chills and fever. Respiratory: Negative for chest tightness and shortness of breath. Cardiovascular: Negative. Gastrointestinal: Negative for abdominal pain, nausea and vomiting. Genitourinary: Negative. Musculoskeletal: Positive for arthralgias. Negative for myalgias. Skin: Negative. Neurological: Negative for dizziness and light-headedness. Psychiatric/Behavioral: Negative for behavioral problems and confusion. Except as noted above the remainder of the review of systems was reviewed and negative.        PAST MEDICAL HISTORY         Diagnosis Date    GERD (gastroesophageal reflux disease)     Headache     Multiple Report received at bedside for this pt. Pt A&OX4, and awake in bed during bedside report, vital signs stable . RN introduced self and plan for the morning, pt verbalized understanding. Ice machine filled by this RN. White board has been updated for the day, call light within reach and fall precautions in place. Pt has no additional needs at this time. Plan of care ongoing. Electronically signed by Edilia Mueller RN on 8/21/2024 at 8:59 AM       sweat gland abscesses     Shingles     Stage 2 carcinoma of ovary ([address]     Stomach ulcer        SURGICAL HISTORY           Procedure Laterality Date    ANKLE SURGERY      cyst removal 1/18    CHOLECYSTECTOMY  9/1999    CHOLECYSTECTOMY N/A     DENTAL SURGERY  9/2011    HYSTERECTOMY      TUBAL LIGATION  2/2006       CURRENT MEDICATIONS     [unfilled]    ALLERGIES     Imitrex [sumatriptan]; Ibuprofen; Naproxen; Reglan [metoclopramide]; and Tramadol    FAMILY HISTORY           Problem Relation Age of Onset    Asthma Other     Diabetes Other     Cancer Other     COPD Other     High Blood Pressure Other      Family Status   Relation Name Status    Mother  Alive    Father  Alive    Other  (Not Specified)    Other  (Not Specified)    Other  (Not Specified)    Other  (Not Specified)    Other  (Not Specified)        SOCIAL HISTORY      reports that she has been smoking cigarettes. She has a 18.75 pack-year smoking history. She has never used smokeless tobacco. She reports current alcohol use. She reports that she does not use drugs. PHYSICAL EXAM    (up to 7 for level 4, 8 or more for level 5)     ED Triage Vitals [07/03/20 1839]   Enc Vitals Group      BP (!) 154/92      Pulse 78      Resp 18      Temp 98.5 °F (36.9 °C)      Temp Source Oral      SpO2 96 %      Weight 247 lb 5.7 oz (112.2 kg)      Height 5' 2\" (1.575 m)      Head Circumference       Peak Flow       Pain Score       Pain Loc       Pain Edu? Excl. in [address]? Physical Exam  Vitals signs reviewed. Constitutional:       Appearance: Normal appearance. HENT:      Head: Normocephalic and atraumatic. Neck:      Musculoskeletal: Normal range of motion and neck supple. Comments: No midline spinous process tenderness  Pulmonary:      Effort: Pulmonary effort is normal. No respiratory distress. Breath sounds: Normal breath sounds. Abdominal:      Palpations: Abdomen is soft. Tenderness:  There is no abdominal tenderness. Musculoskeletal:      Comments: RUE: Tenderness to palpation to superior aspect of right shoulder, no palpable deformity, no cutaneous changes, limited abduction secondary to pain, current apartment soft nontender distally, radial pulse +2, median/ulnar/radial nerve intact, tenseness along superolateral trapezius muscle   Skin:     General: Skin is warm. Neurological:      General: No focal deficit present. Mental Status: She is alert and oriented to person, place, and time. Psychiatric:         Mood and Affect: Mood normal.         Behavior: Behavior normal.           DIAGNOSTIC RESULTS     EKG: All EKG's are interpreted by the Emergency Department Physician who either signs or Co-signs this chart in the absence of a cardiologist.    RADIOLOGY:   Non-plain film images such as CT, Ultrasound and MRI are read by the radiologist. Plain radiographic images are visualized and preliminarilyinterpreted by the emergency physician with the below findings:    Interpretation per the Radiologist below,if available at the time of this note:    XR SHOULDER RIGHT (MIN 2 VIEWS)   Final Result   Negative. LABS:  Labs Reviewed - No data to display    All other labs were within normal range or not returned as of this dictation. EMERGENCY DEPARTMENT COURSE and DIFFERENTIAL DIAGNOSIS/MDM:   Vitals:    Vitals:    07/03/20 1839   BP: (!) 154/92   Pulse: 78   Resp: 18   Temp: 98.5 °F (36.9 °C)   TempSrc: Oral   SpO2: 96%   Weight: 247 lb 5.7 oz (112.2 kg)   Height: 5' 2\" (1.575 m)       MDM     Patient presents ED with HPI noted above. She is hemodynamically stable, afebrile and nontoxic-appearing. She is not hypoxic with oxygen saturation of 96% on room air. Physical exam as above. X-ray obtained. X-ray showed no acute osseous abnormality. Patient neurovascularly intact distally. Patient given IM Toradol and IM Norflex in the ED. Patient discharged home with muscle x-ray.   Told to take

## 2024-08-22 LAB
GLUCOSE BLD-MCNC: 107 MG/DL (ref 70–99)
PERFORMED ON: ABNORMAL

## 2024-08-22 PROCEDURE — 6370000000 HC RX 637 (ALT 250 FOR IP): Performed by: ORTHOPAEDIC SURGERY

## 2024-08-22 PROCEDURE — 97530 THERAPEUTIC ACTIVITIES: CPT

## 2024-08-22 PROCEDURE — 97116 GAIT TRAINING THERAPY: CPT

## 2024-08-22 PROCEDURE — 97535 SELF CARE MNGMENT TRAINING: CPT

## 2024-08-22 PROCEDURE — G0378 HOSPITAL OBSERVATION PER HR: HCPCS

## 2024-08-22 PROCEDURE — 97110 THERAPEUTIC EXERCISES: CPT

## 2024-08-22 PROCEDURE — 94760 N-INVAS EAR/PLS OXIMETRY 1: CPT

## 2024-08-22 PROCEDURE — 94660 CPAP INITIATION&MGMT: CPT

## 2024-08-22 PROCEDURE — 2580000003 HC RX 258: Performed by: ORTHOPAEDIC SURGERY

## 2024-08-22 RX ADMIN — LEVOTHYROXINE SODIUM 125 MCG: 0.12 TABLET ORAL at 05:39

## 2024-08-22 RX ADMIN — BUPROPION HYDROCHLORIDE 150 MG: 150 TABLET, FILM COATED, EXTENDED RELEASE ORAL at 08:55

## 2024-08-22 RX ADMIN — LORAZEPAM 0.5 MG: 0.5 TABLET ORAL at 08:55

## 2024-08-22 RX ADMIN — BUPROPION HYDROCHLORIDE 150 MG: 150 TABLET, FILM COATED, EXTENDED RELEASE ORAL at 20:07

## 2024-08-22 RX ADMIN — CARVEDILOL 12.5 MG: 12.5 TABLET, FILM COATED ORAL at 17:24

## 2024-08-22 RX ADMIN — SACUBITRIL AND VALSARTAN 1 TABLET: 49; 51 TABLET, FILM COATED ORAL at 23:04

## 2024-08-22 RX ADMIN — ACETAMINOPHEN 325MG 650 MG: 325 TABLET ORAL at 17:24

## 2024-08-22 RX ADMIN — ACETAMINOPHEN 325MG 650 MG: 325 TABLET ORAL at 03:56

## 2024-08-22 RX ADMIN — ASPIRIN 81 MG: 81 TABLET, COATED ORAL at 20:07

## 2024-08-22 RX ADMIN — OXYCODONE HYDROCHLORIDE 10 MG: 10 TABLET ORAL at 08:55

## 2024-08-22 RX ADMIN — PANTOPRAZOLE SODIUM 40 MG: 40 TABLET, DELAYED RELEASE ORAL at 05:39

## 2024-08-22 RX ADMIN — SODIUM CHLORIDE, PRESERVATIVE FREE 10 ML: 5 INJECTION INTRAVENOUS at 08:58

## 2024-08-22 RX ADMIN — DULOXETINE HYDROCHLORIDE 60 MG: 60 CAPSULE, DELAYED RELEASE ORAL at 08:55

## 2024-08-22 RX ADMIN — SACUBITRIL AND VALSARTAN 1 TABLET: 49; 51 TABLET, FILM COATED ORAL at 08:55

## 2024-08-22 RX ADMIN — OXYCODONE HYDROCHLORIDE 10 MG: 10 TABLET ORAL at 23:04

## 2024-08-22 RX ADMIN — BUSPIRONE HYDROCHLORIDE 15 MG: 15 TABLET ORAL at 08:55

## 2024-08-22 RX ADMIN — ACETAMINOPHEN 325MG 650 MG: 325 TABLET ORAL at 08:55

## 2024-08-22 RX ADMIN — ACETAMINOPHEN 325MG 650 MG: 325 TABLET ORAL at 20:07

## 2024-08-22 RX ADMIN — LORAZEPAM 0.5 MG: 0.5 TABLET ORAL at 20:07

## 2024-08-22 RX ADMIN — OXYCODONE HYDROCHLORIDE 10 MG: 10 TABLET ORAL at 17:24

## 2024-08-22 RX ADMIN — BUSPIRONE HYDROCHLORIDE 15 MG: 15 TABLET ORAL at 20:07

## 2024-08-22 RX ADMIN — SODIUM CHLORIDE, PRESERVATIVE FREE 10 ML: 5 INJECTION INTRAVENOUS at 20:08

## 2024-08-22 RX ADMIN — CARVEDILOL 12.5 MG: 12.5 TABLET, FILM COATED ORAL at 08:55

## 2024-08-22 RX ADMIN — ASPIRIN 81 MG: 81 TABLET, COATED ORAL at 08:55

## 2024-08-22 RX ADMIN — ATORVASTATIN CALCIUM 10 MG: 10 TABLET, FILM COATED ORAL at 20:07

## 2024-08-22 ASSESSMENT — PAIN DESCRIPTION - DESCRIPTORS
DESCRIPTORS: ACHING

## 2024-08-22 ASSESSMENT — PAIN - FUNCTIONAL ASSESSMENT
PAIN_FUNCTIONAL_ASSESSMENT: PREVENTS OR INTERFERES SOME ACTIVE ACTIVITIES AND ADLS
PAIN_FUNCTIONAL_ASSESSMENT: ACTIVITIES ARE NOT PREVENTED

## 2024-08-22 ASSESSMENT — PAIN SCALES - WONG BAKER: WONGBAKER_NUMERICALRESPONSE: NO HURT

## 2024-08-22 ASSESSMENT — PAIN DESCRIPTION - ONSET
ONSET: ON-GOING

## 2024-08-22 ASSESSMENT — PAIN SCALES - GENERAL
PAINLEVEL_OUTOF10: 4
PAINLEVEL_OUTOF10: 7
PAINLEVEL_OUTOF10: 7
PAINLEVEL_OUTOF10: 6
PAINLEVEL_OUTOF10: 9
PAINLEVEL_OUTOF10: 3
PAINLEVEL_OUTOF10: 6

## 2024-08-22 ASSESSMENT — PAIN DESCRIPTION - ORIENTATION
ORIENTATION: LEFT

## 2024-08-22 ASSESSMENT — PAIN DESCRIPTION - LOCATION
LOCATION: KNEE

## 2024-08-22 ASSESSMENT — PAIN DESCRIPTION - PAIN TYPE
TYPE: SURGICAL PAIN

## 2024-08-22 ASSESSMENT — PAIN DESCRIPTION - FREQUENCY
FREQUENCY: CONTINUOUS

## 2024-08-22 NOTE — PROGRESS NOTES
Patient is alert and oriented X4. Patient has been resting in bed throughout night, bed in lowest position. Fall precautions in place. Patient ambulated to bathroom throughout night using a walker. Patient given nightly medications as ordered. Polar pack applied to L knee.Patient home medications reconciled and send to pharmacy in zip lock bag. Unopened bottle of oxycodone present.  Patient tolerating PO fluids at this time and having adequate intake and output. All patient needs met throughout night. Plan of care ongoing.      Electronically signed by Ashlie Valencia RN on 8/21/2024 at 11:56 PM

## 2024-08-22 NOTE — CARE COORDINATION
[facility]; [facility]ed [facility] for rolling walker which will be delivered to his home via [employer]. [facility] per Alejandra @ [phone]. Alejandra is aware of potential transfer to ARU. Discussed plan with patient and wife as well who are in agreement. Wife is hiring private duty care through [facility] and will let them know he is not coming home today.   Electronically signed by SOL Tatum on 8/22/2024 at 2:25 PM

## 2024-08-22 NOTE — PROGRESS NOTES
Occupational Therapy  Facility/Department: [address] ORTHOPEDICS  Occupational Therapy Daily Treatment Note  This note to serve as OT d/c summary if pt is d/c-ed from hospital prior to next OT session.      Name: Mark Josue  : 1945  MRN: [id number]  Date of Service: 2024    Discharge Recommendations:  5-7 sessions per week          Patient Diagnosis(es): The encounter diagnosis was Arthritis of left knee.  Past Medical History:  has a past medical history of Anxiety, CAD (coronary artery disease), Depression, GERD (gastroesophageal reflux disease), Hyperlipidemia, Hypertension, TAM on CPAP, Thyroid disease, and Wears glasses.  Past Surgical History:  has a past surgical history that includes Knee arthroscopy (Right); Shoulder arthroscopy (Left, 15 years ago); Cardiac surgery (); Cholecystectomy (18 years ago); Tibia fracture surgery (Left, 2015); Cataract extraction (Bilateral); Colonoscopy (); and Total knee arthroplasty (Left, 2024).           Assessment   Performance deficits / Impairments: Decreased endurance;Decreased functional mobility ;Decreased ADL status;Decreased balance;Decreased strength  Assessment: Pt is a 78 yo M presenting s/p elective L TKR per Dr. Deng 24. WBAT. PMH including: fx L tibia (). PTA - pt lives at home with spouse, 5+1 TATIANNA, IND ADLs and mobility/txs with cane. Today - pt completed bed mobility min A, functional txs <> household surfaces min A with cues, amb with RW bed > bathroom > recliner CGA.  Pt sits abruptly into chairs requiring cues for safety.  UB dressing setup s/u. LB dressing mod assist. Pt is greatly limited due to pain requiring increased assist for ADL's and transfers.  Pt is not safe to return home with his spouse at this time and would benefit from moderate intensity therapy prior to discharge home.  Will cont to assess pending pt progress.  Prognosis: Good  Activity Tolerance  Activity Tolerance: Patient limited by pain     help is needed for toileting (which includes using toilet, bedpan, or urinal)?: A Little  How much help is needed for putting on and taking off regular upper body clothing?: A Little  How much help is needed for taking care of personal grooming?: A Little  How much help for eating meals?: None  AM-[facility] Inpatient Daily Activity Raw Score: 17  AM-PAC Inpatient ADL T-Scale Score : 37.26  ADL Inpatient CMS 0-100% Score: 50.11  ADL Inpatient CMS G-Code Modifier : CK      Goals  Short Term Goals  Time Frame for Short Term Goals: Prior to D/C  Short Term Goal 1: Pt will complete bed mobility sup  Short Term Goal 2: Pt will complete functional mobility/txs using LRAD sup  Short Term Goal 3: Pt will complete toileting sup  Short Term Goal 4: Pt will complete dressing sup  Short Term Goal 5: Pt will complete grooming in stance sinkside sup  Long Term Goals  Time Frame for Long Term Goals : STG = LTG  Patient Goals   Patient goals : pain management & to go home       Therapy Time   Individual Concurrent Group Co-treatment   Time In 0925         Time Out 1025         Minutes 60               SHERYL Ward/DAQUAN [id number]

## 2024-08-22 NOTE — CONSULTS
Consult Note  Physical Medicine and Rehabilitation    Patient: Mark Josue  [id number]  Date: 8/22/2024      Chief Complaint: Arthritis left knee    History of Present Illness/Hospital Course:  Patient is a 80 yo male with pmh anxiety, hypertension, prediabetes, TAM on CPAP, and severe OA of the left knee who initially presented on 8/20/2024 for a scheduled left total knee arthroplasty with Dr. Deng. Post-op course notable for hypertension and hyperglycemia. Currently, patient reports moderate to severe pain in the left knee and thigh. Worse with movement. Improves with rest and medication. He denies any sensory changes. Has has some lightheadedness with upright activity. He would like to come to ARU to improve his function prior to returning home.     Prior Level of Function:  Independent for mobility with cane, ADLs, and IADLs  active     Current Level of Function:  CGA/min for functional transfers and ambulation with RW  Up to mod assist for full ADL    Pertinent Social History:  Support: lives with spouse  Home set-up: one level with 5+1 TATIANNA    Past Medical History:   Diagnosis Date    Anxiety     CAD (coronary artery disease)     Depression     GERD (gastroesophageal reflux disease)     Hyperlipidemia     Hypertension     TAM on CPAP     Thyroid disease     Wears glasses        Past Surgical History:   Procedure Laterality Date    CARDIAC SURGERY  2004    stent    CATARACT EXTRACTION Bilateral     CHOLECYSTECTOMY  18 years ago    COLONOSCOPY  2021    KNEE ARTHROSCOPY Right     SHOULDER ARTHROSCOPY Left 15 years ago    TIBIA FRACTURE SURGERY Left 01/26/2015    ORIF left tibia nonunion fracture    TOTAL KNEE ARTHROPLASTY Left 8/20/2024    LEFT TOTAL KNEE REPLACEMENT WITH  ROBOTIC ASSISTANCE performed by R[facility] Deng [facility] at [facility] OR       Family History   Problem Relation Age of Onset    Cancer Mother     Other Father         MVA       Social History     Socioeconomic History    Marital status:       Spouse name: None    Number of children: None    Years of education: None    Highest education level: None   Occupational History    Occupation: ret   Tobacco Use    Smoking status: Former    Smokeless tobacco: Never    Tobacco comments:     quit:  approx. 2004   Vaping Use    Vaping status: Never Used   Substance and Sexual Activity    Alcohol use: Yes     Comment: rare    Drug use: No    Sexual activity: Yes     Partners: Female     Social Determinants of Health     Food Insecurity: No Food Insecurity (8/20/2024)    Hunger Vital Sign     Worried About Running Out of Food in the Last Year: Never true     Ran Out of Food in the Last Year: Never true   Transportation Needs: No Transportation Needs (8/20/2024)    PRAPARE - Transportation     Lack of Transportation (Medical): No     Lack of Transportation (Non-Medical): No   Physical Activity: Inactive (3/27/2024)    Exercise Vital Sign     Days of Exercise per Week: 0 days     Minutes of Exercise per Session: 0 min    [facility] from [facility]     Interpersonal Safety   Housing Stability: Low Risk  (8/20/2024)    Housing Stability Vital Sign     Unable to Pay for Housing in the Last Year: No     Number of Times Moved in the Last Year: 1     Homeless in the Last Year: No           REVIEW OF SYSTEMS:   CONSTITUTIONAL: negative for fevers, chills, diaphoresis, appetite change, night sweats, unexpected weight change, fatigue  EYES: negative for blurred vision, eye discharge, visual disturbance and icterus  HEENT: negative for hearing loss, tinnitus, ear drainage, sinus pressure, nasal congestion, epistaxis and snoring  RESPIRATORY: Negative for hemoptysis, cough, sputum production  CARDIOVASCULAR: negative for chest pain, palpitations, exertional chest pressure/discomfort, syncope, edema  GASTROINTESTINAL: negative for nausea, vomiting, diarrhea, blood in stool, abdominal pain, constipation  GENITOURINARY: negative for frequency, dysuria, urinary

## 2024-08-22 NOTE — PROGRESS NOTES
Physical Therapy  Facility/Department: [address] ORTHOPEDICS  Daily Treatment Note    This note serves as patient discharge summary if pt discharges prior to next PT visit      Name: Mark Josue  : 1945  MRN: [id number]  Date of Service: 2024    Discharge Recommendations:  Therapy recommended at discharge (5-7)     Mark Josue scored a 14/24 on the AM-PAC short mobility form. Current research shows that an AM-PAC score of 17 or less is typically not associated with a discharge to the patient's home setting. Based on the patient's AM-PAC score and their current functional mobility deficits, it is recommended that the patient have 5-7 sessions per week of Physical Therapy at d/c to increase the patient's independence.  At this time, this patient demonstrates complex nursing, medical, and rehabilitative needs, and would benefit from intensive rehabilitation services upon discharge from the Inpatient setting.  This patient demonstrates the ability to participate in and benefit from an intensive therapy program with a coordinated interdisciplinary team approach to foster frequent, structured, and documented communication among disciplines, who will work together to establish, prioritize, and achieve treatment goals. Please see assessment section for further patient specific details.          PT Equipment Recommendations  Equipment Needed: Yes  Mobility Devices: Walker  Walker: Rolling  Other: Patient is 6'4\", 260 pounds.      Patient Diagnosis(es): The encounter diagnosis was Arthritis of left knee.  Past Medical History:  has a past medical history of Anxiety, CAD (coronary artery disease), Depression, GERD (gastroesophageal reflux disease), Hyperlipidemia, Hypertension, TAM on CPAP, Thyroid disease, and Wears glasses.  Past Surgical History:  has a past surgical history that includes Knee arthroscopy (Right); Shoulder arthroscopy (Left, 15 years ago); Cardiac surgery (); Cholecystectomy (  Little  How much help is needed standing up from a chair using your arms?: A Lot  How much help is needed walking in hospital room?: A Lot  How much help is needed climbing 3-5 steps with a railing?: Total  AM-PAC Inpatient Mobility Raw Score : 14  AM-PAC Inpatient T-Scale Score : 38.1  Mobility Inpatient CMS 0-100% Score: 61.29  Mobility Inpatient CMS G-Code Modifier : CL    Goals  Short Term Goals  Time Frame for Short Term Goals: By acute DC 8-22-24: all goals ongoing.  Short Term Goal 1: Transfers SBA  Short Term Goal 2: RW gait 50' SBA  Short Term Goal 3: Performs stairs as needed for home entry with CGA/cues  Patient Goals   Patient Goals : \"To recover so my wife can get her knee replaced.\"       Education  Patient Education  Education Provided Comments: Written TKR HEP, including activity expectations, and importance of positioning  L knee in full extension and flexion, to promote full ROM return, provided to patient 8-20-24 and fully reviewed 8-21-24. [email or web address] .  Patient /spouse also educated in care and use of cryocuff.  Progression from home PT to outpatient PT discussed.    8-22-24 to patient:  importance of pain management via ice and medication.  Spoke to patient's wife by phone earlier this date (~1500) regarding patient status, and new rec. for rehab prior to home.  Education Method: Demonstration;Verbal;Teach Back;Printed Information/Hand-outs  Education Outcome: Verbalized understanding;Demonstrated understanding;Continued education needed      Therapy Time   Individual Concurrent Group Co-treatment   Time In 1630         Time Out 1715         Minutes 45            Gt 10 TA 25 TE 10    Jagjit Huizar, PT     Electronically signed by JAGJIT HUIZAR, PT [id number] (#[phone])  on 8/22/2024 at 6:05 PM

## 2024-08-22 NOTE — PROGRESS NOTES
ARU consult received. Chart reviewed and discussed with Dr. Salas. Accepted for to admission to ARU when medically stable pending [facility]. [facility] at [phone].

## 2024-08-22 NOTE — PLAN OF CARE
Problem: Discharge Planning  Goal: Discharge to home or other facility with appropriate resources  8/22/2024 1401 by Edilia Mueller RN  Outcome: Progressing  8/22/2024 0247 by Ashlie Osman RN  Outcome: Progressing  Flowsheets (Taken 8/21/2024 2000)  Discharge to home or other facility with appropriate resources:   Arrange for needed discharge resources and transportation as appropriate   Identify barriers to discharge with patient and caregiver     Problem: Chronic Conditions and Co-morbidities  Goal: Patient's chronic conditions and co-morbidity symptoms are monitored and maintained or improved  8/22/2024 1401 by Edilia Mueller RN  Outcome: Progressing  Flowsheets (Taken 8/22/2024 1401)  Care Plan - Patient's Chronic Conditions and Co-Morbidity Symptoms are Monitored and Maintained or Improved:   Monitor and assess patient's chronic conditions and comorbid symptoms for stability, deterioration, or improvement   Collaborate with multidisciplinary team to address chronic and comorbid conditions and prevent exacerbation or deterioration   Update acute care plan with appropriate goals if chronic or comorbid symptoms are exacerbated and prevent overall improvement and discharge  8/22/2024 0247 by Ashlie Osman RN  Outcome: Progressing  Flowsheets (Taken 8/21/2024 2000)  Care Plan - Patient's Chronic Conditions and Co-Morbidity Symptoms are Monitored and Maintained or Improved:   Monitor and assess patient's chronic conditions and comorbid symptoms for stability, deterioration, or improvement   Collaborate with multidisciplinary team to address chronic and comorbid conditions and prevent exacerbation or deterioration     Problem: Neurosensory - Adult  Goal: Achieves stable or improved neurological status  8/22/2024 1401 by Edilia Mueller RN  Outcome: Progressing  8/22/2024 0247 by Ashlie Osman, RN  Outcome: Progressing  Flowsheets (Taken 8/21/2024 2000)  Achieves stable or improved neurological  status:   Initiate measures to prevent increased intracranial pressure   Assess for and report changes in neurological status   Maintain blood pressure and fluid volume within ordered parameters to optimize cerebral perfusion and minimize risk of hemorrhage   Monitor temperature, glucose, and sodium. Initiate appropriate interventions as ordered     Problem: Skin/Tissue Integrity - Adult  Goal: Incisions, wounds, or drain sites healing without S/S of infection  8/22/2024 1401 by Edilia Mueller RN  Outcome: Progressing  8/22/2024 0247 by Ashlie Osman RN  Outcome: Progressing  Flowsheets (Taken 8/21/2024 2000)  Incisions, Wounds, or Drain Sites Healing Without Sign and Symptoms of Infection:   ADMISSION and DAILY: Assess and document risk factors for pressure ulcer development   TWICE DAILY: Assess and document skin integrity   TWICE DAILY: Assess and document dressing/incision, wound bed, drain sites and surrounding tissue     Problem: Musculoskeletal - Adult  Goal: Return mobility to safest level of function  8/22/2024 1401 by Edilia Mueller RN  Outcome: Progressing  8/22/2024 0247 by Ashlie Osman RN  Outcome: Progressing  Flowsheets (Taken 8/21/2024 2000)  Return Mobility to Safest Level of Function:   Assess patient stability and activity tolerance for standing, transferring and ambulating with or without assistive devices   Assist with transfers and ambulation using safe patient handling equipment as needed   Ensure adequate protection for wounds/incisions during mobilization   Obtain physical therapy/occupational therapy consults as needed   Apply continuous passive motion per provider or physical therapy orders to increase flexion toward goal  Goal: Maintain proper alignment of affected body part  8/22/2024 1401 by Edilia Mueller RN  Outcome: Progressing  8/22/2024 0247 by Ashlie Osman RN  Outcome: Progressing  Flowsheets (Taken 8/21/2024 2000)  Maintain proper alignment of affected body

## 2024-08-22 NOTE — PLAN OF CARE
Problem: Discharge Planning  Goal: Discharge to home or other facility with appropriate resources  Outcome: Progressing  Flowsheets (Taken 8/21/2024 2000)  Discharge to home or other facility with appropriate resources:   Arrange for needed discharge resources and transportation as appropriate   Identify barriers to discharge with patient and caregiver     Problem: Chronic Conditions and Co-morbidities  Goal: Patient's chronic conditions and co-morbidity symptoms are monitored and maintained or improved  Outcome: Progressing  Flowsheets (Taken 8/21/2024 2000)  Care Plan - Patient's Chronic Conditions and Co-Morbidity Symptoms are Monitored and Maintained or Improved:   Monitor and assess patient's chronic conditions and comorbid symptoms for stability, deterioration, or improvement   Collaborate with multidisciplinary team to address chronic and comorbid conditions and prevent exacerbation or deterioration     Problem: Neurosensory - Adult  Goal: Achieves stable or improved neurological status  Outcome: Progressing  Flowsheets (Taken 8/21/2024 2000)  Achieves stable or improved neurological status:   Initiate measures to prevent increased intracranial pressure   Assess for and report changes in neurological status   Maintain blood pressure and fluid volume within ordered parameters to optimize cerebral perfusion and minimize risk of hemorrhage   Monitor temperature, glucose, and sodium. Initiate appropriate interventions as ordered     Problem: Skin/Tissue Integrity - Adult  Goal: Incisions, wounds, or drain sites healing without S/S of infection  Outcome: Progressing  Flowsheets (Taken 8/21/2024 2000)  Incisions, Wounds, or Drain Sites Healing Without Sign and Symptoms of Infection:   ADMISSION and DAILY: Assess and document risk factors for pressure ulcer development   TWICE DAILY: Assess and document skin integrity   TWICE DAILY: Assess and document dressing/incision, wound bed, drain sites and surrounding

## 2024-08-22 NOTE — PROGRESS NOTES
ARU consult received. Chart reviewed and discussed with Dr. Salas. Accepted for admission to ARU pending medical readiness and insurance approval. [facility] for [facility] initiation. Will await return call. CM updated.

## 2024-08-22 NOTE — PROGRESS NOTES
[facility] Orthopedic Surgery   Progress Note      S/P :  SUBJECTIVE  in chair. Up with therapy to walk to BR and became lightheaded and complaint of severe left knee pain. Pain is   described in left knee and with the intensity of severe. Pain is described as aching, pressure.       OBJECTIVE              Physical                      VITALS:  /80   Pulse 76   Temp 97.8 °F (36.6 °C)   Resp 18   Ht 1.93 m (6' 4\")   Wt 117.9 kg (260 lb)   SpO2 94%   BMI 31.65 kg/m²                     MUSCULOSKELETAL:  left foot NVI. Wiggles toes to command. Able to plantarflex and dorsiflex ankle Pedal pulses are palpable.                    NEUROLOGIC:                                  Sensory:  Touch:  Left Lower Extremity:  normal                                                 Surgical wound appears clean and dyr left knee with moderate swelling and bruising noted. GRAHAM ornelase on.     Data       CBC:   Lab Results   Component Value Date/Time    WBC 8.7 08/12/2024 11:15 AM    RBC 4.70 08/12/2024 11:15 AM    HGB 15.9 08/12/2024 11:15 AM    HCT 45.8 08/12/2024 11:15 AM    MCV 97.3 08/12/2024 11:15 AM    MCH 33.8 08/12/2024 11:15 AM    MCHC 34.7 08/12/2024 11:15 AM    RDW 15.1 08/12/2024 11:15 AM     08/12/2024 11:15 AM    MPV 8.9 08/12/2024 11:15 AM        WBC:    Lab Results   Component Value Date/Time    WBC 8.7 08/12/2024 11:15 AM        Hemoglobin/Hematocrit:    Lab Results   Component Value Date/Time    HGB 15.9 08/12/2024 11:15 AM    HCT 45.8 08/12/2024 11:15 AM        PT/INR:    Lab Results   Component Value Date/Time    PROTIME 14.9 08/12/2024 11:15 AM    INR 1.15 08/12/2024 11:15 AM              Current Inpatient Medications             Current Facility-Administered Medications: buPROPion (WELLBUTRIN SR) extended release tablet 150 mg, 150 mg, Oral, BID  busPIRone (BUSPAR) tablet 15 mg, 15 mg, Oral, BID  carvedilol (COREG) tablet 12.5 mg, 12.5 mg, Oral, BID WC  levothyroxine (SYNTHROID) tablet 125 mcg, 125 mcg,

## 2024-08-23 ENCOUNTER — APPOINTMENT (OUTPATIENT)
Dept: GENERAL RADIOLOGY | Age: 79
End: 2024-08-23
Attending: ORTHOPAEDIC SURGERY
Payer: OTHER GOVERNMENT

## 2024-08-23 ENCOUNTER — HOSPITAL ENCOUNTER (INPATIENT)
Age: 79
DRG: 560 | End: 2024-08-23
Attending: PHYSICAL MEDICINE & REHABILITATION | Admitting: PHYSICAL MEDICINE & REHABILITATION
Payer: MEDICARE

## 2024-08-23 VITALS
TEMPERATURE: 97.5 F | BODY MASS INDEX: 31.66 KG/M2 | WEIGHT: 260 LBS | HEART RATE: 82 BPM | HEIGHT: 76 IN | RESPIRATION RATE: 19 BRPM | OXYGEN SATURATION: 95 % | SYSTOLIC BLOOD PRESSURE: 119 MMHG | DIASTOLIC BLOOD PRESSURE: 69 MMHG

## 2024-08-23 PROBLEM — Z96.652 S/P TKR (TOTAL KNEE REPLACEMENT), LEFT: Status: ACTIVE | Noted: 2024-08-23

## 2024-08-23 LAB
GLUCOSE BLD-MCNC: 103 MG/DL (ref 70–99)
GLUCOSE BLD-MCNC: 110 MG/DL (ref 70–99)
PERFORMED ON: ABNORMAL
PERFORMED ON: ABNORMAL

## 2024-08-23 PROCEDURE — 94760 N-INVAS EAR/PLS OXIMETRY 1: CPT

## 2024-08-23 PROCEDURE — 73560 X-RAY EXAM OF KNEE 1 OR 2: CPT

## 2024-08-23 PROCEDURE — 9990000010 HC NO CHARGE VISIT

## 2024-08-23 PROCEDURE — 6370000000 HC RX 637 (ALT 250 FOR IP): Performed by: ORTHOPAEDIC SURGERY

## 2024-08-23 PROCEDURE — G0378 HOSPITAL OBSERVATION PER HR: HCPCS

## 2024-08-23 PROCEDURE — 6370000000 HC RX 637 (ALT 250 FOR IP): Performed by: PHYSICAL MEDICINE & REHABILITATION

## 2024-08-23 PROCEDURE — 2580000003 HC RX 258: Performed by: ORTHOPAEDIC SURGERY

## 2024-08-23 PROCEDURE — 1280000000 HC REHAB R&B

## 2024-08-23 PROCEDURE — 2580000003 HC RX 258: Performed by: PHYSICAL MEDICINE & REHABILITATION

## 2024-08-23 RX ORDER — SODIUM CHLORIDE 0.9 % (FLUSH) 0.9 %
5-40 SYRINGE (ML) INJECTION EVERY 12 HOURS SCHEDULED
Status: CANCELLED | OUTPATIENT
Start: 2024-08-23

## 2024-08-23 RX ORDER — CALCIUM CARBONATE 500 MG/1
500 TABLET, CHEWABLE ORAL 3 TIMES DAILY PRN
Status: ACTIVE | OUTPATIENT
Start: 2024-08-23

## 2024-08-23 RX ORDER — SODIUM CHLORIDE 9 MG/ML
INJECTION, SOLUTION INTRAVENOUS PRN
Status: ACTIVE | OUTPATIENT
Start: 2024-08-23

## 2024-08-23 RX ORDER — OXYCODONE HYDROCHLORIDE 10 MG/1
10 TABLET ORAL EVERY 4 HOURS PRN
Status: DISCONTINUED | OUTPATIENT
Start: 2024-08-23 | End: 2024-08-26

## 2024-08-23 RX ORDER — SODIUM CHLORIDE 0.9 % (FLUSH) 0.9 %
5-40 SYRINGE (ML) INJECTION PRN
Status: CANCELLED | OUTPATIENT
Start: 2024-08-23

## 2024-08-23 RX ORDER — LEVOTHYROXINE SODIUM 125 UG/1
125 TABLET ORAL DAILY
Status: CANCELLED | OUTPATIENT
Start: 2024-08-24

## 2024-08-23 RX ORDER — LANOLIN ALCOHOL/MO/W.PET/CERES
3 CREAM (GRAM) TOPICAL NIGHTLY PRN
Status: ACTIVE | OUTPATIENT
Start: 2024-08-23

## 2024-08-23 RX ORDER — ONDANSETRON 2 MG/ML
4 INJECTION INTRAMUSCULAR; INTRAVENOUS EVERY 6 HOURS PRN
Status: CANCELLED | OUTPATIENT
Start: 2024-08-23

## 2024-08-23 RX ORDER — SODIUM CHLORIDE 0.9 % (FLUSH) 0.9 %
5-40 SYRINGE (ML) INJECTION EVERY 12 HOURS SCHEDULED
Status: ACTIVE | OUTPATIENT
Start: 2024-08-23

## 2024-08-23 RX ORDER — SENNA AND DOCUSATE SODIUM 50; 8.6 MG/1; MG/1
1 TABLET, FILM COATED ORAL 2 TIMES DAILY
Status: CANCELLED | OUTPATIENT
Start: 2024-08-23

## 2024-08-23 RX ORDER — PANTOPRAZOLE SODIUM 40 MG/1
40 TABLET, DELAYED RELEASE ORAL
Status: CANCELLED | OUTPATIENT
Start: 2024-08-24

## 2024-08-23 RX ORDER — CARVEDILOL 12.5 MG/1
12.5 TABLET ORAL 2 TIMES DAILY WITH MEALS
Status: CANCELLED | OUTPATIENT
Start: 2024-08-23

## 2024-08-23 RX ORDER — OXYCODONE HYDROCHLORIDE 10 MG/1
10 TABLET ORAL EVERY 4 HOURS PRN
Status: CANCELLED | OUTPATIENT
Start: 2024-08-23

## 2024-08-23 RX ORDER — LEVOTHYROXINE SODIUM 125 UG/1
125 TABLET ORAL DAILY
Status: DISPENSED | OUTPATIENT
Start: 2024-08-24

## 2024-08-23 RX ORDER — OXYCODONE HYDROCHLORIDE 5 MG/1
5 TABLET ORAL EVERY 4 HOURS PRN
Status: DISCONTINUED | OUTPATIENT
Start: 2024-08-23 | End: 2024-08-26

## 2024-08-23 RX ORDER — LORAZEPAM 0.5 MG/1
0.5 TABLET ORAL 2 TIMES DAILY
Status: DISPENSED | OUTPATIENT
Start: 2024-08-23

## 2024-08-23 RX ORDER — POLYETHYLENE GLYCOL 3350 17 G/17G
17 POWDER, FOR SOLUTION ORAL DAILY PRN
Status: DISPENSED | OUTPATIENT
Start: 2024-08-23

## 2024-08-23 RX ORDER — ONDANSETRON 4 MG/1
4 TABLET, ORALLY DISINTEGRATING ORAL EVERY 8 HOURS PRN
Status: CANCELLED | OUTPATIENT
Start: 2024-08-23

## 2024-08-23 RX ORDER — ACETAMINOPHEN 325 MG/1
650 TABLET ORAL EVERY 6 HOURS PRN
Status: CANCELLED | OUTPATIENT
Start: 2024-08-23

## 2024-08-23 RX ORDER — ASPIRIN 81 MG/1
81 TABLET ORAL 2 TIMES DAILY
Status: CANCELLED | OUTPATIENT
Start: 2024-08-23

## 2024-08-23 RX ORDER — ACETAMINOPHEN 325 MG/1
650 TABLET ORAL EVERY 6 HOURS PRN
Status: DISCONTINUED | OUTPATIENT
Start: 2024-08-23 | End: 2024-08-26

## 2024-08-23 RX ORDER — BISACODYL 10 MG
10 SUPPOSITORY, RECTAL RECTAL DAILY PRN
Status: CANCELLED | OUTPATIENT
Start: 2024-08-23

## 2024-08-23 RX ORDER — SODIUM CHLORIDE 9 MG/ML
INJECTION, SOLUTION INTRAVENOUS PRN
Status: CANCELLED | OUTPATIENT
Start: 2024-08-23

## 2024-08-23 RX ORDER — ONDANSETRON 4 MG/1
4 TABLET, ORALLY DISINTEGRATING ORAL EVERY 8 HOURS PRN
Status: ACTIVE | OUTPATIENT
Start: 2024-08-23

## 2024-08-23 RX ORDER — ATORVASTATIN CALCIUM 10 MG/1
10 TABLET, FILM COATED ORAL NIGHTLY
Status: DISPENSED | OUTPATIENT
Start: 2024-08-23

## 2024-08-23 RX ORDER — LORAZEPAM 0.5 MG/1
0.5 TABLET ORAL 2 TIMES DAILY
Status: CANCELLED | OUTPATIENT
Start: 2024-08-23

## 2024-08-23 RX ORDER — HYDRALAZINE HYDROCHLORIDE 10 MG/1
10 TABLET, FILM COATED ORAL EVERY 6 HOURS PRN
Status: ACTIVE | OUTPATIENT
Start: 2024-08-23

## 2024-08-23 RX ORDER — SENNA AND DOCUSATE SODIUM 50; 8.6 MG/1; MG/1
1 TABLET, FILM COATED ORAL 2 TIMES DAILY
Status: DISPENSED | OUTPATIENT
Start: 2024-08-23

## 2024-08-23 RX ORDER — DULOXETIN HYDROCHLORIDE 60 MG/1
60 CAPSULE, DELAYED RELEASE ORAL DAILY
Status: DISPENSED | OUTPATIENT
Start: 2024-08-24

## 2024-08-23 RX ORDER — LANOLIN ALCOHOL/MO/W.PET/CERES
3 CREAM (GRAM) TOPICAL NIGHTLY PRN
Status: CANCELLED | OUTPATIENT
Start: 2024-08-23

## 2024-08-23 RX ORDER — BISACODYL 10 MG
10 SUPPOSITORY, RECTAL RECTAL DAILY PRN
Status: DISPENSED | OUTPATIENT
Start: 2024-08-23

## 2024-08-23 RX ORDER — ASPIRIN 81 MG/1
81 TABLET ORAL 2 TIMES DAILY
Status: DISPENSED | OUTPATIENT
Start: 2024-08-23

## 2024-08-23 RX ORDER — SODIUM CHLORIDE 0.9 % (FLUSH) 0.9 %
5-40 SYRINGE (ML) INJECTION PRN
Status: ACTIVE | OUTPATIENT
Start: 2024-08-23

## 2024-08-23 RX ORDER — DULOXETIN HYDROCHLORIDE 60 MG/1
60 CAPSULE, DELAYED RELEASE ORAL DAILY
Status: CANCELLED | OUTPATIENT
Start: 2024-08-24

## 2024-08-23 RX ORDER — CALCIUM CARBONATE 500 MG/1
500 TABLET, CHEWABLE ORAL 3 TIMES DAILY PRN
Status: CANCELLED | OUTPATIENT
Start: 2024-08-23

## 2024-08-23 RX ORDER — ONDANSETRON 2 MG/ML
4 INJECTION INTRAMUSCULAR; INTRAVENOUS EVERY 6 HOURS PRN
Status: ACTIVE | OUTPATIENT
Start: 2024-08-23

## 2024-08-23 RX ORDER — OXYCODONE HYDROCHLORIDE 5 MG/1
5 TABLET ORAL EVERY 4 HOURS PRN
Status: CANCELLED | OUTPATIENT
Start: 2024-08-23

## 2024-08-23 RX ORDER — PANTOPRAZOLE SODIUM 40 MG/1
40 TABLET, DELAYED RELEASE ORAL
Status: DISPENSED | OUTPATIENT
Start: 2024-08-24

## 2024-08-23 RX ORDER — BUPROPION HYDROCHLORIDE 150 MG/1
150 TABLET, EXTENDED RELEASE ORAL 2 TIMES DAILY
Status: CANCELLED | OUTPATIENT
Start: 2024-08-23

## 2024-08-23 RX ORDER — ATORVASTATIN CALCIUM 10 MG/1
10 TABLET, FILM COATED ORAL NIGHTLY
Status: CANCELLED | OUTPATIENT
Start: 2024-08-23

## 2024-08-23 RX ORDER — HYDRALAZINE HYDROCHLORIDE 10 MG/1
10 TABLET, FILM COATED ORAL EVERY 6 HOURS PRN
Status: CANCELLED | OUTPATIENT
Start: 2024-08-23

## 2024-08-23 RX ORDER — CARVEDILOL 12.5 MG/1
12.5 TABLET ORAL 2 TIMES DAILY WITH MEALS
Status: DISPENSED | OUTPATIENT
Start: 2024-08-23

## 2024-08-23 RX ORDER — BUPROPION HYDROCHLORIDE 150 MG/1
150 TABLET, EXTENDED RELEASE ORAL 2 TIMES DAILY
Status: DISPENSED | OUTPATIENT
Start: 2024-08-23

## 2024-08-23 RX ORDER — POLYETHYLENE GLYCOL 3350 17 G/17G
17 POWDER, FOR SOLUTION ORAL DAILY PRN
Status: CANCELLED | OUTPATIENT
Start: 2024-08-23

## 2024-08-23 RX ORDER — BUSPIRONE HYDROCHLORIDE 15 MG/1
15 TABLET ORAL 2 TIMES DAILY
Status: CANCELLED | OUTPATIENT
Start: 2024-08-23

## 2024-08-23 RX ADMIN — PANTOPRAZOLE SODIUM 40 MG: 40 TABLET, DELAYED RELEASE ORAL at 05:30

## 2024-08-23 RX ADMIN — CARVEDILOL 12.5 MG: 12.5 TABLET, FILM COATED ORAL at 11:11

## 2024-08-23 RX ADMIN — LORAZEPAM 0.5 MG: 0.5 TABLET ORAL at 19:56

## 2024-08-23 RX ADMIN — CARVEDILOL 12.5 MG: 12.5 TABLET, FILM COATED ORAL at 17:25

## 2024-08-23 RX ADMIN — OXYCODONE HYDROCHLORIDE 5 MG: 5 TABLET ORAL at 11:22

## 2024-08-23 RX ADMIN — LORAZEPAM 0.5 MG: 0.5 TABLET ORAL at 11:11

## 2024-08-23 RX ADMIN — LEVOTHYROXINE SODIUM 125 MCG: 0.12 TABLET ORAL at 05:31

## 2024-08-23 RX ADMIN — SODIUM CHLORIDE, PRESERVATIVE FREE 10 ML: 5 INJECTION INTRAVENOUS at 11:11

## 2024-08-23 RX ADMIN — BUSPIRONE HYDROCHLORIDE 15 MG: 5 TABLET ORAL at 19:55

## 2024-08-23 RX ADMIN — BUSPIRONE HYDROCHLORIDE 15 MG: 15 TABLET ORAL at 11:11

## 2024-08-23 RX ADMIN — ACETAMINOPHEN 325MG 650 MG: 325 TABLET ORAL at 05:30

## 2024-08-23 RX ADMIN — ACETAMINOPHEN 325MG 650 MG: 325 TABLET ORAL at 11:11

## 2024-08-23 RX ADMIN — OXYCODONE HYDROCHLORIDE 10 MG: 10 TABLET ORAL at 05:31

## 2024-08-23 RX ADMIN — BUPROPION HYDROCHLORIDE 150 MG: 150 TABLET, FILM COATED, EXTENDED RELEASE ORAL at 11:11

## 2024-08-23 RX ADMIN — ASPIRIN 81 MG: 81 TABLET, COATED ORAL at 11:11

## 2024-08-23 RX ADMIN — SODIUM CHLORIDE, PRESERVATIVE FREE 8 ML: 5 INJECTION INTRAVENOUS at 19:59

## 2024-08-23 RX ADMIN — ASPIRIN 81 MG: 81 TABLET, COATED ORAL at 19:56

## 2024-08-23 RX ADMIN — ATORVASTATIN CALCIUM 10 MG: 10 TABLET, FILM COATED ORAL at 19:56

## 2024-08-23 RX ADMIN — SACUBITRIL AND VALSARTAN 1 TABLET: 49; 51 TABLET, FILM COATED ORAL at 20:33

## 2024-08-23 RX ADMIN — DULOXETINE HYDROCHLORIDE 60 MG: 60 CAPSULE, DELAYED RELEASE ORAL at 11:11

## 2024-08-23 RX ADMIN — BUPROPION HYDROCHLORIDE 150 MG: 150 TABLET, FILM COATED, EXTENDED RELEASE ORAL at 19:55

## 2024-08-23 RX ADMIN — OXYCODONE HYDROCHLORIDE 5 MG: 5 TABLET ORAL at 15:54

## 2024-08-23 RX ADMIN — SACUBITRIL AND VALSARTAN 1 TABLET: 49; 51 TABLET, FILM COATED ORAL at 11:15

## 2024-08-23 ASSESSMENT — PAIN SCALES - GENERAL
PAINLEVEL_OUTOF10: 7
PAINLEVEL_OUTOF10: 2
PAINLEVEL_OUTOF10: 7
PAINLEVEL_OUTOF10: 6
PAINLEVEL_OUTOF10: 7
PAINLEVEL_OUTOF10: 6
PAINLEVEL_OUTOF10: 4
PAINLEVEL_OUTOF10: 1
PAINLEVEL_OUTOF10: 5
PAINLEVEL_OUTOF10: 3

## 2024-08-23 ASSESSMENT — PAIN DESCRIPTION - LOCATION
LOCATION: KNEE

## 2024-08-23 ASSESSMENT — PAIN DESCRIPTION - ONSET
ONSET: ON-GOING

## 2024-08-23 ASSESSMENT — PAIN DESCRIPTION - ORIENTATION
ORIENTATION: LEFT

## 2024-08-23 ASSESSMENT — PAIN DESCRIPTION - FREQUENCY
FREQUENCY: CONTINUOUS

## 2024-08-23 ASSESSMENT — PAIN DESCRIPTION - DESCRIPTORS
DESCRIPTORS: ACHING
DESCRIPTORS: ACHING;DISCOMFORT
DESCRIPTORS: NUMBNESS
DESCRIPTORS: ACHING
DESCRIPTORS: SORE

## 2024-08-23 ASSESSMENT — PAIN DESCRIPTION - PAIN TYPE
TYPE: SURGICAL PAIN

## 2024-08-23 NOTE — PROGRESS NOTES
Patient admitted to room 3261 per bed.  Patient was oriented to the Call Light, Phone, TV, Thermostat, Bed Controls, Bathroom and Emergency Cord.  Patient verbalized and demonstrated understanding of all.  Patient was also given an over view of Unit Routines for Acute Rehab, including what to wear for therapy. The patient's role in goal setting was reviewed along with an explanation of the Interdisciplinary Team meeting, the 's role in coordinating services and the Discharge Planning/Continuum of Care process. Patient Rights and Responsibilities were reviewed. Meal times were explained, including how to order food.  The white board (used for communication) was pointed out emphasizing  the 3 hours/day Therapy Schedule (posted most evenings), the number (and process) for reporting grievances, and the Doctor's, Nurse's, and PCA's names. It was recommended that any family that will be care givers or any care givers the patient has, take part in therapy. Informed patient about visiting hours, lunch group and conference. Electronically signed by LUIS TENORIO RN on 8/23/2024 at 2:38 PM

## 2024-08-23 NOTE — PROGRESS NOTES
Total Knee Follow-up: Patient here for 3 days post left total knee arthroplasty follow-up. Pain is controlled with current analgesics.  Medication(s) being used: acetaminophen, narcotic analgesics including oxycodone (Oxycontin, Oxyir). The patient denies  none, fever, wound drainage, increasing redness, pus, increasing pain, increasing swelling. Post op problems reported:  none He is ambulating 2 ft. Did not tolerate well. Electronically signed by Yuliya Merida RN on 8/23/2024 at 6:41 AM  .

## 2024-08-23 NOTE — PROGRESS NOTES
A complete drug regimen review was completed for this patient.     [x]  No clinically significant medication issue was identified    []   Yes, a clinically significant medication issue was identified     []  Adverse Drug Event:      []  Allergy:      []  Side Effect:      []  Ineffective Therapy:      []  Drug Interaction:     []  Duplicated Therapy:     []  Untreated Indication:      []  Non-adherence:     []  Other:        Electronically signed by LUIS TENORIO RN on 8/23/24 at 2:39 PM EDT

## 2024-08-23 NOTE — PROGRESS NOTES
4 Eyes Skin Assessment     NAME:  Mark Josue  YOB: 1945  MEDICAL RECORD NUMBER:  [id number]    The patient is being assessed for  Admission    I agree that at least one RN has performed a thorough Head to Toe Skin Assessment on the patient. ALL assessment sites listed below have been assessed.      Areas assessed by both nurses:    Head, Face, Ears, Shoulders, Back, Chest, Arms, Elbows, Hands, Sacrum. Buttock, Coccyx, Ischium, Legs. Feet and Heels, and Under Medical Devices         Does the Patient have a Wound? No noted wound(s)       Dick Prevention initiated by RN: Yes  Wound Care Orders initiated by RN: No    Pressure Injury (Stage 3,4, Unstageable, DTI, NWPT, and Complex wounds) if present, place Wound referral order by RN under : No    New Ostomies, if present place, Ostomy referral order under : No     Nurse 1 eSignature: Electronically signed by Nuris Edwards RN on 8/23/24 at 3:09 PM EDT    **SHARE this note so that the co-signing nurse can place an eSignature**    Nurse 2 eSignature: Electronically signed by LUIS TENORIO RN on 8/23/24 at 3:15 PM EDT

## 2024-08-23 NOTE — PROGRESS NOTES
[facility] Orthopedic Surgery   Progress Note      S/P :  SUBJECTIVE In bed. States he \"fell\" this AM at bedside with staff. Left knee gave out per progress notes. Notes state pt was lowered to bed. Pt reports he \"fell to ground on left knee\". Pain is   described in left knee and with the intensity of moderate. Pain is described as aching.       OBJECTIVE              Physical                      VITALS:  /69   Pulse 82   Temp 97.5 °F (36.4 °C) (Oral)   Resp 19   Ht 1.93 m (6' 4\")   Wt 117.9 kg (260 lb)   SpO2 95%   BMI 31.65 kg/m²                     MUSCULOSKELETAL:  left foot NVI. Wiggles toes to command. Able to plantarflex and dorsiflex ankle Pedal pulses are palpable.                    NEUROLOGIC:                                  Sensory:  Touch:  Left Lower Extremity:  normal                                                 Surgical wound appears clean and dry left knee with Prineo dressing Ice pad on. GRAHAM hose on. NO notable left knee deformity or bruising.     Data       CBC:   Lab Results   Component Value Date/Time    WBC 8.7 08/12/2024 11:15 AM    RBC 4.70 08/12/2024 11:15 AM    HGB 15.9 08/12/2024 11:15 AM    HCT 45.8 08/12/2024 11:15 AM    MCV 97.3 08/12/2024 11:15 AM    MCH 33.8 08/12/2024 11:15 AM    MCHC 34.7 08/12/2024 11:15 AM    RDW 15.1 08/12/2024 11:15 AM     08/12/2024 11:15 AM    MPV 8.9 08/12/2024 11:15 AM        WBC:    Lab Results   Component Value Date/Time    WBC 8.7 08/12/2024 11:15 AM        Hemoglobin/Hematocrit:    Lab Results   Component Value Date/Time    HGB 15.9 08/12/2024 11:15 AM    HCT 45.8 08/12/2024 11:15 AM        PT/INR:    Lab Results   Component Value Date/Time    PROTIME 14.9 08/12/2024 11:15 AM    INR 1.15 08/12/2024 11:15 AM              Current Inpatient Medications             Current Facility-Administered Medications: buPROPion (WELLBUTRIN SR) extended release tablet 150 mg, 150 mg, Oral, BID  busPIRone (BUSPAR) tablet 15 mg, 15 mg, Oral,

## 2024-08-23 NOTE — PROGRESS NOTES
Ethnicity  \"Are you of , /a, or [place] Islander origin?\"  Check all that apply:  [x] A.  No, not of , /a, or [place] Islander Origin  [] B.  Yes, Icelandic, Icelandic American, Chicano/a  [] C.  Yes, Namibian  [] D.  Yes, Wilfrido  [] E.  Yes, another , , or [place] Islander origin  [] X.  Patient unable to respond    Race  \"What is your race?\"  Check all that apply:  [x] A.  White  [] B.  Black or   [] C.   or   [] D.     [] E.  Chinese  [] F.  Brazilian  [] G. Citizen of [place]  [] H.  Telugu  [] I.  Zimbabwean  [] J.  Other   [] K.    [] L.  Djiboutian or Neymar  [] M.  Turkmen  [] N.  Other   [] X.  Patient unable to respond    High Risk Drug Classes:  Use and Indication    Is taking: Check if the pt is taking any medications by pharmacological classification, not how it is used, in the following classes  Indication noted: If column 1 is checked, check if there is an indication noted for all meds in the drug class Is taking  (check all that apply) Indication noted (check all that apply)   Antipsychotic [] []   Anticoagulant [] []   Antibiotic [] []   Opioid [x] []   Antiplatelet [x] []   Hypoglycemic (including insulin) [] []   None of the above []     Special Treatments, Procedures, and Programs    Check all of the following treatments, procedures, and programs that apply on admission. On admission (check all that apply)   Cancer Treatments   A1. Chemotherapy []           A2. IV []           A3. Oral []           A10. Other []   B1. Radiation []   Respiratory Therapies   C1. Oxygen Therapy []           C2. Continuous []           C3. Intermittent []           C4. High-concentration []   D1. Suctioning []           D2. Scheduled []           D3. As needed []   E1. Tracheostomy Care []   F1. Invasive Mechanical Ventilator (ventilator or respirator) []   G1. Non-invasive Mechanical Ventilator []           G2. BiPAP []

## 2024-08-23 NOTE — PLAN OF CARE
discharge  8/23/2024 0816 by Perico Polanco RN  Outcome: Progressing  Flowsheets (Taken 8/22/2024 2313 by Yluiya Merida RN)  Absence of infection at discharge:   Assess and monitor for signs and symptoms of infection   Monitor lab/diagnostic results   Monitor all insertion sites i.e., indwelling lines, tubes and drains  8/22/2024 2313 by Yuliya Merida RN  Outcome: Progressing  Flowsheets  Taken 8/22/2024 2313  Absence of infection at discharge:   Assess and monitor for signs and symptoms of infection   Monitor lab/diagnostic results   Monitor all insertion sites i.e., indwelling lines, tubes and drains  Taken 8/22/2024 2010  Absence of infection at discharge:   Assess and monitor for signs and symptoms of infection   Monitor lab/diagnostic results   Monitor all insertion sites i.e., indwelling lines, tubes and drains  Goal: Absence of infection during hospitalization  8/23/2024 0816 by Perico Polanco RN  Outcome: Progressing  Flowsheets (Taken 8/22/2024 2313 by Yuliya Merida RN)  Absence of infection during hospitalization:   Assess and monitor for signs and symptoms of infection   Monitor lab/diagnostic results   Monitor all insertion sites i.e., indwelling lines, tubes and drains  8/22/2024 2313 by Yuliya Merida RN  Outcome: Progressing  Flowsheets  Taken 8/22/2024 2313  Absence of infection during hospitalization:   Assess and monitor for signs and symptoms of infection   Monitor lab/diagnostic results   Monitor all insertion sites i.e., indwelling lines, tubes and drains  Taken 8/22/2024 2010  Absence of infection during hospitalization:   Assess and monitor for signs and symptoms of infection   Monitor lab/diagnostic results   Monitor all insertion sites i.e., indwelling lines, tubes and drains     Problem: Metabolic/Fluid and Electrolytes - Adult  Goal: Glucose maintained within prescribed range  8/23/2024 0816 by Perico Polanco RN  Outcome: Progressing  Flowsheets (Taken 8/22/2024 2313 by [place]  2010  Patient/family able to verbalize anxieties, fears, and concerns, and demonstrate effective coping:   Assist patient/family to identify coping skills, available support systems and cultural and spiritual values   Provide emotional support, including active listening and acknowledgement of concerns of patient and caregivers   Reduce environmental stimuli, as able   Instruct patient/family in relaxation techniques, as appropriate

## 2024-08-23 NOTE — PLAN OF CARE
Problem: Discharge Planning  Goal: Discharge to home or other facility with appropriate resources  8/22/2024 2313 by Yuliya Merida, RN  Outcome: Progressing  Flowsheets (Taken 8/22/2024 2313)  Discharge to home or other facility with appropriate resources:   Identify barriers to discharge with patient and caregiver   Arrange for needed discharge resources and transportation as appropriate   Identify discharge learning needs (meds, wound care, etc)  8/22/2024 1401 by Edilia Mueller RN  Outcome: Progressing     Problem: Chronic Conditions and Co-morbidities  Goal: Patient's chronic conditions and co-morbidity symptoms are monitored and maintained or improved  8/22/2024 2313 by Yuliya Merida, RN  Outcome: Progressing  Flowsheets (Taken 8/22/2024 1401 by Edilia Mueller RN)  Care Plan - Patient's Chronic Conditions and Co-Morbidity Symptoms are Monitored and Maintained or Improved:   Monitor and assess patient's chronic conditions and comorbid symptoms for stability, deterioration, or improvement   Collaborate with multidisciplinary team to address chronic and comorbid conditions and prevent exacerbation or deterioration   Update acute care plan with appropriate goals if chronic or comorbid symptoms are exacerbated and prevent overall improvement and discharge  8/22/2024 1401 by Edilia Mueller RN  Outcome: Progressing  Flowsheets (Taken 8/22/2024 1401)  Care Plan - Patient's Chronic Conditions and Co-Morbidity Symptoms are Monitored and Maintained or Improved:   Monitor and assess patient's chronic conditions and comorbid symptoms for stability, deterioration, or improvement   Collaborate with multidisciplinary team to address chronic and comorbid conditions and prevent exacerbation or deterioration   Update acute care plan with appropriate goals if chronic or comorbid symptoms are exacerbated and prevent overall improvement and discharge     Problem: Neurosensory - Adult  Goal: Achieves stable or improved

## 2024-08-23 NOTE — PROGRESS NOTES
Occupational Therapy  Mark Josue  [id number]  [id number]    Pt chart reviewed. Per NP Maddison Paz, pt report he fell this AM at bedside. Xray L knee ordered. Will hold therapy pending Xray.     Chelsea Larry, OTR/L [id number]

## 2024-08-23 NOTE — H&P
Department of Physical Medicine & Rehabilitation  History & Physical      Patient Identification:  Mark Josue  : 1945  Admit date: 2024   Attending provider: Nuris Salas MD        Primary care provider: Darius Sow MD     Chief Complaint: Arthritis left knee     History of Present Illness/Hospital Course:  Patient is a 78 yo male with pmh anxiety, hypertension, prediabetes, TAM on CPAP, and severe OA of the left knee who initially presented on 2024 for a scheduled left total knee arthroplasty with Dr. Deng. Post-op course notable for hypertension and hyperglycemia. Now presents to ARU with impaired mobility and self-care below his baseline. Currently, patient reports moderate to severe pain in the left knee and thigh. Worse with movement. Improves with rest and medication. He denies any sensory changes. Has had some lightheadedness with upright activity. He is motivated to start inpatient rehabilitation program.      Prior Level of Function:  Independent for mobility with cane, ADLs, and IADLs  active      Current Level of Function:  CGA/min for functional transfers and ambulation with RW  Up to mod assist for full ADL     Pertinent Social History:  Support: lives with spouse  Home set-up: one level with 5+1 TATIANNA       Past Medical History:   Diagnosis Date    Anxiety     CAD (coronary artery disease)     Depression     GERD (gastroesophageal reflux disease)     Hyperlipidemia     Hypertension     TAM on CPAP     Thyroid disease     Wears glasses        Past Surgical History:   Procedure Laterality Date    CARDIAC SURGERY  2004    stent    CATARACT EXTRACTION Bilateral     CHOLECYSTECTOMY  18 years ago    COLONOSCOPY      KNEE ARTHROSCOPY Right     SHOULDER ARTHROSCOPY Left 15 years ago    TIBIA FRACTURE SURGERY Left 2015    ORIF left tibia nonunion fracture    TOTAL KNEE ARTHROPLASTY Left 2024    LEFT TOTAL KNEE REPLACEMENT WITH  ROBOTIC ASSISTANCE

## 2024-08-23 NOTE — PROGRESS NOTES
Pt education importance of ambulating per this shift. Pt refusing throughout shift. Pt agreeable to be weighted per scale. Pt standing with walker x 2 assist.  Pt l stating \" my left leg muscle is giving out.\" Pt lowered to bed by nurse and PCA. Pt denies increase in pain. Pedal pulses palpable. [place] CDI. Will pass on in shift report. Bed Wheels locked and alarm engaged,   Non-skid socks on, and Call light within reach.  Will continue to care. Electronically signed by Yuliya Merida RN on 8/23/2024 at 6:23 AM

## 2024-08-23 NOTE — CARE COORDINATION
CASE MANAGEMENT DISCHARGE SUMMARY:    DISCHARGE DATE: 8/23/2024    DISCHARGED TO [facility] Acute Re[facility]              COMMENTS: Received [facility] from [facility]; patient can be accepted today.   [facility] ([phone]) who was following patient for possible home discharge needs (home care and dme). Alejandra asked that she be called to assist with home discharge needs once ready to discharge from acute rehab.   Rn aware.     Electronically signed by ALANNA Taylor, LISW, Case Management on 8/23/2024 at 10:07 AM  [place] [phone]

## 2024-08-24 LAB
ANION GAP SERPL CALCULATED.3IONS-SCNC: 10 MMOL/L (ref 3–16)
BASOPHILS # BLD: 0 K/UL (ref 0–0.2)
BASOPHILS NFR BLD: 0.4 %
BUN SERPL-MCNC: 29 MG/DL (ref 7–20)
CALCIUM SERPL-MCNC: 8.7 MG/DL (ref 8.3–10.6)
CHLORIDE SERPL-SCNC: 96 MMOL/L (ref 99–110)
CO2 SERPL-SCNC: 23 MMOL/L (ref 21–32)
CREAT SERPL-MCNC: 1 MG/DL (ref 0.8–1.3)
DEPRECATED RDW RBC AUTO: 15.2 % (ref 12.4–15.4)
EOSINOPHIL # BLD: 0.1 K/UL (ref 0–0.6)
EOSINOPHIL NFR BLD: 0.7 %
GFR SERPLBLD CREATININE-BSD FMLA CKD-EPI: 78 ML/MIN/{1.73_M2}
GLUCOSE BLD-MCNC: 109 MG/DL (ref 70–99)
GLUCOSE SERPL-MCNC: 89 MG/DL (ref 70–99)
HCT VFR BLD AUTO: 34.1 % (ref 40.5–52.5)
HGB BLD-MCNC: 11.9 G/DL (ref 13.5–17.5)
LYMPHOCYTES # BLD: 1 K/UL (ref 1–5.1)
LYMPHOCYTES NFR BLD: 10.3 %
MCH RBC QN AUTO: 33.8 PG (ref 26–34)
MCHC RBC AUTO-ENTMCNC: 34.8 G/DL (ref 31–36)
MCV RBC AUTO: 97.2 FL (ref 80–100)
MONOCYTES # BLD: 1.4 K/UL (ref 0–1.3)
MONOCYTES NFR BLD: 14.9 %
NEUTROPHILS # BLD: 7.1 K/UL (ref 1.7–7.7)
NEUTROPHILS NFR BLD: 73.7 %
PERFORMED ON: ABNORMAL
PLATELET # BLD AUTO: 161 K/UL (ref 135–450)
PMV BLD AUTO: 8.7 FL (ref 5–10.5)
POTASSIUM SERPL-SCNC: 4 MMOL/L (ref 3.5–5.1)
PREALB SERPL-MCNC: 10.7 MG/DL (ref 20–40)
RBC # BLD AUTO: 3.51 M/UL (ref 4.2–5.9)
SODIUM SERPL-SCNC: 129 MMOL/L (ref 136–145)
WBC # BLD AUTO: 9.7 K/UL (ref 4–11)

## 2024-08-24 PROCEDURE — 97535 SELF CARE MNGMENT TRAINING: CPT

## 2024-08-24 PROCEDURE — 97166 OT EVAL MOD COMPLEX 45 MIN: CPT

## 2024-08-24 PROCEDURE — 97162 PT EVAL MOD COMPLEX 30 MIN: CPT

## 2024-08-24 PROCEDURE — 94660 CPAP INITIATION&MGMT: CPT

## 2024-08-24 PROCEDURE — 97530 THERAPEUTIC ACTIVITIES: CPT

## 2024-08-24 PROCEDURE — 36415 COLL VENOUS BLD VENIPUNCTURE: CPT

## 2024-08-24 PROCEDURE — 94760 N-INVAS EAR/PLS OXIMETRY 1: CPT

## 2024-08-24 PROCEDURE — 2580000003 HC RX 258: Performed by: PHYSICAL MEDICINE & REHABILITATION

## 2024-08-24 PROCEDURE — 6370000000 HC RX 637 (ALT 250 FOR IP): Performed by: PHYSICAL MEDICINE & REHABILITATION

## 2024-08-24 PROCEDURE — 97110 THERAPEUTIC EXERCISES: CPT

## 2024-08-24 PROCEDURE — 6370000000 HC RX 637 (ALT 250 FOR IP): Performed by: STUDENT IN AN ORGANIZED HEALTH CARE EDUCATION/TRAINING PROGRAM

## 2024-08-24 PROCEDURE — 84134 ASSAY OF PREALBUMIN: CPT

## 2024-08-24 PROCEDURE — 85025 COMPLETE CBC W/AUTO DIFF WBC: CPT

## 2024-08-24 PROCEDURE — 1280000000 HC REHAB R&B

## 2024-08-24 PROCEDURE — 80048 BASIC METABOLIC PNL TOTAL CA: CPT

## 2024-08-24 RX ORDER — FLUTICASONE PROPIONATE 50 MCG
1 SPRAY, SUSPENSION (ML) NASAL DAILY
Status: DISPENSED | OUTPATIENT
Start: 2024-08-24

## 2024-08-24 RX ADMIN — LORAZEPAM 0.5 MG: 0.5 TABLET ORAL at 08:54

## 2024-08-24 RX ADMIN — POLYETHYLENE GLYCOL 3350 17 G: 17 POWDER, FOR SOLUTION ORAL at 05:43

## 2024-08-24 RX ADMIN — BUSPIRONE HYDROCHLORIDE 15 MG: 5 TABLET ORAL at 08:53

## 2024-08-24 RX ADMIN — CARVEDILOL 12.5 MG: 12.5 TABLET, FILM COATED ORAL at 16:22

## 2024-08-24 RX ADMIN — ATORVASTATIN CALCIUM 10 MG: 10 TABLET, FILM COATED ORAL at 21:28

## 2024-08-24 RX ADMIN — ASPIRIN 81 MG: 81 TABLET, COATED ORAL at 08:54

## 2024-08-24 RX ADMIN — SACUBITRIL AND VALSARTAN 1 TABLET: 49; 51 TABLET, FILM COATED ORAL at 21:32

## 2024-08-24 RX ADMIN — BUSPIRONE HYDROCHLORIDE 15 MG: 5 TABLET ORAL at 21:28

## 2024-08-24 RX ADMIN — SENNOSIDES AND DOCUSATE SODIUM 1 TABLET: 50; 8.6 TABLET ORAL at 08:53

## 2024-08-24 RX ADMIN — BUPROPION HYDROCHLORIDE 150 MG: 150 TABLET, FILM COATED, EXTENDED RELEASE ORAL at 21:28

## 2024-08-24 RX ADMIN — SODIUM CHLORIDE, PRESERVATIVE FREE 10 ML: 5 INJECTION INTRAVENOUS at 09:25

## 2024-08-24 RX ADMIN — ASPIRIN 81 MG: 81 TABLET, COATED ORAL at 21:28

## 2024-08-24 RX ADMIN — EMPAGLIFLOZIN 10 MG: 10 TABLET, FILM COATED ORAL at 08:53

## 2024-08-24 RX ADMIN — CARVEDILOL 12.5 MG: 12.5 TABLET, FILM COATED ORAL at 08:51

## 2024-08-24 RX ADMIN — BUPROPION HYDROCHLORIDE 150 MG: 150 TABLET, FILM COATED, EXTENDED RELEASE ORAL at 08:53

## 2024-08-24 RX ADMIN — FLUTICASONE PROPIONATE 1 SPRAY: 50 SPRAY, METERED NASAL at 11:48

## 2024-08-24 RX ADMIN — PANTOPRAZOLE SODIUM 40 MG: 40 TABLET, DELAYED RELEASE ORAL at 05:42

## 2024-08-24 RX ADMIN — DULOXETINE HYDROCHLORIDE 60 MG: 60 CAPSULE, DELAYED RELEASE ORAL at 08:54

## 2024-08-24 RX ADMIN — SACUBITRIL AND VALSARTAN 1 TABLET: 49; 51 TABLET, FILM COATED ORAL at 08:53

## 2024-08-24 RX ADMIN — SODIUM CHLORIDE, PRESERVATIVE FREE 8 ML: 5 INJECTION INTRAVENOUS at 21:35

## 2024-08-24 RX ADMIN — LEVOTHYROXINE SODIUM 125 MCG: 0.12 TABLET ORAL at 05:42

## 2024-08-24 RX ADMIN — LORAZEPAM 0.5 MG: 0.5 TABLET ORAL at 21:28

## 2024-08-24 RX ADMIN — MAGNESIUM HYDROXIDE 30 ML: 400 SUSPENSION ORAL at 12:40

## 2024-08-24 NOTE — PLAN OF CARE
Problem: Discharge Planning  Goal: Discharge to home or other facility with appropriate resources  8/24/2024 1249 by Shreya Tatum LPN  Outcome: Progressing  Flowsheets (Taken 8/24/2024 1249)  Discharge to home or other facility with appropriate resources:   Identify barriers to discharge with patient and caregiver   Identify discharge learning needs (meds, wound care, etc)     Problem: Safety - Adult  Goal: Free from fall injury  8/24/2024 1249 by Shreya Tatum LPN  Outcome: Progressing  Flowsheets (Taken 8/24/2024 1249)  Free From Fall Injury: Instruct family/caregiver on patient safety     Problem: Pain  Goal: Verbalizes/displays adequate comfort level or baseline comfort level  8/24/2024 1249 by Shreya Tatum LPN  Outcome: Progressing  Flowsheets (Taken 8/24/2024 1249)  Verbalizes/displays adequate comfort level or baseline comfort level:   Encourage patient to monitor pain and request assistance   Assess pain using appropriate pain scale   Administer analgesics based on type and severity of pain and evaluate response   Implement non-pharmacological measures as appropriate and evaluate response     Problem: ABCDS Injury Assessment  Goal: Absence of physical injury  8/24/2024 1249 by Shreya Tatum LPN  Outcome: Progressing  Flowsheets (Taken 8/24/2024 1249)  Absence of Physical Injury: Implement safety measures based on patient assessment

## 2024-08-24 NOTE — PROGRESS NOTES
Physical Therapy  Facility/Department: [facility] REHAB  Rehabilitation Physical Therapy Initial Assessment    NAME: Mark Josue  : 1945 (79 y.o.)  MRN: [id number]  CODE STATUS: Full Code    Date of Service: 24      Past Medical History:   Diagnosis Date    Anxiety     CAD (coronary artery disease)     Depression     GERD (gastroesophageal reflux disease)     Hyperlipidemia     Hypertension     TAM on CPAP     Thyroid disease     Wears glasses      Past Surgical History:   Procedure Laterality Date    CARDIAC SURGERY  2004    stent    CATARACT EXTRACTION Bilateral     CHOLECYSTECTOMY  18 years ago    COLONOSCOPY      KNEE ARTHROSCOPY Right     SHOULDER ARTHROSCOPY Left 15 years ago    TIBIA FRACTURE SURGERY Left 2015    ORIF left tibia nonunion fracture    TOTAL KNEE ARTHROPLASTY Left 2024    LEFT TOTAL KNEE REPLACEMENT WITH  ROBOTIC ASSISTANCE performed by Rory Deng MD at [facility] OR       Chart Reviewed: Yes  Patient assessed for rehabilitation services?: Yes  Additional Pertinent Hx: \"Patient is a 78 yo male with pmh anxiety, hypertension, prediabetes, TAM on CPAP, and severe OA of the left knee who initially presented on 2024 for a scheduled left total knee arthroplasty with Dr. Deng. Post-op course notable for hypertension and hyperglycemia. Now presents to ARU with impaired mobility and self-care below his baseline. Currently, patient reports moderate to severe pain in the left knee and thigh. Worse with movement. Improves with rest and medication. He denies any sensory changes. Has had some lightheadedness with upright activity. He is motivated to start inpatient rehabilitation program.\"  Family / Caregiver Present: No  Referring Practitioner: Nuris Salas MD  Referral Date : 24  Diagnosis: L TKA    Restrictions:  Restrictions/Precautions: Fall Risk;Weight Bearing  Lower Extremity Weight Bearing Restrictions  Left Lower Extremity Weight Bearing: Weight

## 2024-08-24 NOTE — PROGRESS NOTES
Occupational Therapy  Facility/Department: [address] REHAB  Rehabilitation Occupational Therapy Evaluation       Date: 24  Patient Name: Mark Josue       Room: [id number]  MRN: [id number]  Account: [id number]   : 1945  (79 y.o.) Gender: male     Referring Practitioner: Nuris Salas MD  Diagnosis: L TKR  Additional Pertinent Hx: Per Dr. Salas H&P \"Pt is a 80 yo male with pmh anxiety, hypertension, prediabetes, TAM on CPAP, and severe OA of the left knee who initially presented on 2024 for a scheduled left total knee arthroplasty with Dr. Deng. Post-op course notable for hypertension and hyperglycemia. Now presents to ARU with impaired mobility and self-care below his baseline. Currently, patient reports moderate to severe pain in the left knee and thigh. Worse with movement. Improves with rest and medication. He denies any sensory changes. Has had some lightheadedness with upright activity. He is motivated to start inpatient rehabilitation program.\"    Restrictions  Restrictions/Precautions: Fall Risk;Weight Bearing  Left Lower Extremity Weight Bearing: Weight Bearing As Tolerated    Subjective  Subjective: Pt met in room, sitting in recliner. Pt reporting pain in L knee 8/10 seated, increasing to 9/10 with movement. Pt agreeable to OT eval/treat.        Objective  Vision - Basic Assessment  Visual History: Cataracts (history of cataract surgery)  Patient Visual Report: No visual complaint reported.  Orientation  Overall Orientation Status: Within Functional Limits   Sensation  Overall Sensation Status: WNL (Denies N/T)   ROM  LUE AROM (degrees)  LUE AROM : WFL  Left Hand AROM (degrees)  Left Hand AROM: WFL  RUE PROM (degrees)  RUE PROM: WFL  Right Hand AROM (degrees)  Right Hand AROM: WFL  LUE Strength  L Hand General: 4/5  RUE Strength  R Hand General: 4/5  Fine Motor Skills  Coordination  Movements Are Fluid And Coordinated: Yes   Comment: Decreased speed and over/under  session. Pt received Ativan prior to session which caused much drowiness and required ongoing cues to open eyes/complete tasks (notified RN about changing/switching admin time). Pt is functioning far below his baseline of IND and will benefit from skilled OT to facilitate return to PLOF.  Prognosis: Good  Decision Making: Medium Complexity  Treatment Initiated : 8/24/2024  Discharge Recommendations: S Level 1;Home with assist PRN;Patient would benefit from continued therapy after discharge;Home with Home health OT  Occupational Therapy Plan  Times Per Week: 5-6  Times Per Day: Twice a day  Current Treatment Recommendations: Strengthening;Balance training;Functional mobility training;Endurance training;Patient/Caregiver education & training;Safety education & training;Pain management;Self-Care / ADL;Equipment evaluation, education, & procurement;Modalities;Positioning       Therapy Time   Individual Concurrent Group Co-treatment   Time In 0925         Time Out 1055         Minutes 90         Timed Code Treatment Minutes: 75 Minutes (+ 15 eval)       Coni Booker, OTR/L

## 2024-08-24 NOTE — PLAN OF CARE
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     Admitting Impairments: Decreased left knee ROM, decreased balance      Arthritis left knee   -s/p TKA ( with Dr. Deng)  -wound care per Ortho  -PT/OT     Chronic sCHF  -continue Jardiance (sub for home Farxiga), carvedilol, Entresto  -Daily wt     CAD  -continue ASA, statin, bb     HTN, uncontrolled  -Has had some symptoms of lightheadedness with standing  -continue carvedilol, Entresto with hold parameters     Prediabetes with post op hyperglycemia  -Improving, monitor blood sugar intermittently  -Dietitian consult     Hypothyroidism  -continue levothyroxine     TAM  -cotinue CPAP     Anxiety  -continue bupropion, buspirone, duloxetine, lorazepam     Barriers:  Decreased left knee ROM, decreased balance , medical comorbidities   Participation: good     CARE PLAN     NURSING:  Mark Liat while on this unit will:     [] Be continent of bowel and bladder     [x] Have an adequate number of bowel movements  [x] Urinate with no urinary retention >300ml in bladder  [] Complete bladder protocol with mireles removal  [x] Maintain O2 SATs at 92%  [x] Have pain managed while on ARU       [] Be pain free by discharge   [x] Have no skin breakdown while on ARU  [x] Have improved skin integrity via wound  training, Home exercise program, Safety education & training, Patient/Caregiver education & training, Equipment evaluation, education, & procurement, Positioning      OCCUPATIONAL THERAPY:  Goals:             Short Term Goals  Time Frame for Short Term Goals: 1.5 weeks from eval  Short Term Goal 1: Pt will complete toileting Min A  Short Term Goal 2: Pt will complete UB/LB bathing w/ use of AE/AD Min A  Short Term Goal 3: Pt will complete UB/LB dressing w/ use of AE/AD Min A  Short Term Goal 4: Pt will complete functional mobility/ADL transfers w/ LRAD Min A  Short Term Goal 5: Pt will complete standing ADL task for > 3 min CGA :  Long Term Goals  Time Frame for Long Term Goals : 3 weeks from eval  Long Term Goal 1: Pt will complete toileting Mod I  Long Term Goal 2: Pt will complete UB/LB bathing w/ use of AE/AD Mod I  Long Term Goal 3: Pt will complete UB/LB dressing Mod I  Long Term Goal 4: Pt will complete functional mobility/ADL transfers w/ LRAD Mod I  Long Term Goal 5: Pt will participate in UE HEP to improve activity tolerance/strength to complete simple IADL task in stance for > 5 min Mod I  Long Term Goal 6: Address home DME recommendations in prep for safe DC home :    These goals were reviewed with this patient at the time of assessment and Mark Josue is in agreement    Plan of Care:  Pt to be seen 90 mins per day for 5-6 day/week 2-3 weeks.           SPEECH THERAPY: Goals will be left blank if speech is not following this patient.  Goals:                                                                           These goals were reviewed with this patient at the time of assessment and Mark Josue is in agreement    Plan of Care: Pt to be seen    mins per day for  day/week  weeks.          CASE MANAGEMENT:  Goals:   Assist patient/family with discharge planning, patient/family counseling,   and coordination with insurance during ARU stay.      Admission Period/Goal QIM CODES   QIM  Admit/Goal  Independent  [] Home with supervision    []SNF     [] Other                                           Physician anticipated functional outcomes:  Xander for mobility and ADLs    IPOC brief synthesis: Patient is a 80 yo male with pmh anxiety, hypertension, prediabetes, TAM on CPAP, and severe OA of the left knee who initially presented on 8/20/2024 for a scheduled left total knee arthroplasty with Dr. Deng. Post-op course notable for hypertension and hyperglycemia. Now presents to ARU with impaired mobility and self-care below his baseline. He requires comprehensive inpatient rehab program in order to return to community setting.     I have reviewed this initial plan of care and agree with its contents:    Title   Name    Date    Time    Physician: Nuris Salas MD 8/26/2024, 3:44 PM      Case Mgmt:    Electronically signed by SOL Phan on 8/26/24 at 11:18 AM EDT      OT: Electronically signed by Coni Booker OT on 8/24/2024 at 2:51 PM      PT:  Electronically signed by Ahmet Correia, [id number] on 8/24/2024 at 8:50 AM      ST:    ARU Supervisor:Magali Noe RN CRRN 8/26/2024    Other:

## 2024-08-24 NOTE — PROGRESS NOTES
Patient admitted to rehab with total knee replacement.  A/Ox4 with confused conversation at times. Transfers with stedy x2. Mobility restrictions: WBAT. On Regular diet, tolerating well. Medications taken whole with thins one at a time. On ASA for DVT prophylaxis.  Skin: surgical incision left knee. Oxygen: RA. LDA: PIV RFA. Has not had BM since 8-18, today received Miralax, rehab cocktail and MOM. Is continent of urine but spills urinal at times. Chair/bed alarms in use and call light in reach. Will monitor for safety.

## 2024-08-24 NOTE — PROGRESS NOTES
Patient admitted to rehab with left total knee replacement.  A/Ox4. Transfers with stedy x 2. Mobility restrictions: WBAT. On Regular diet, tolerating well. Medications taken whole with thin liquids. On ASA for DVT prophylaxis.  Skin: surgical incision to left knee. Oxygen: RA. LDA: PIV RFA. Has been continent of bowel and incontinent at times of bladder. LBM 8/18 (patient agrees to take Miralax with morning meds). Chair/bed alarms in use and call light in reach. Will monitor for safety.

## 2024-08-25 ENCOUNTER — APPOINTMENT (OUTPATIENT)
Dept: GENERAL RADIOLOGY | Age: 79
DRG: 560 | End: 2024-08-25
Attending: PHYSICAL MEDICINE & REHABILITATION
Payer: MEDICARE

## 2024-08-25 ENCOUNTER — APPOINTMENT (OUTPATIENT)
Dept: CT IMAGING | Age: 79
DRG: 560 | End: 2024-08-25
Attending: STUDENT IN AN ORGANIZED HEALTH CARE EDUCATION/TRAINING PROGRAM
Payer: MEDICARE

## 2024-08-25 LAB
ALBUMIN SERPL-MCNC: 3.2 G/DL (ref 3.4–5)
ALBUMIN/GLOB SERPL: 1 {RATIO} (ref 1.1–2.2)
ALP SERPL-CCNC: 67 U/L (ref 40–129)
ALT SERPL-CCNC: 48 U/L (ref 10–40)
ANION GAP SERPL CALCULATED.3IONS-SCNC: 10 MMOL/L (ref 3–16)
ANION GAP SERPL CALCULATED.3IONS-SCNC: 12 MMOL/L (ref 3–16)
AST SERPL-CCNC: 69 U/L (ref 15–37)
BACTERIA URNS QL MICRO: NORMAL /HPF
BASOPHILS # BLD: 0 K/UL (ref 0–0.2)
BASOPHILS NFR BLD: 0.4 %
BILIRUB SERPL-MCNC: 0.5 MG/DL (ref 0–1)
BILIRUB UR QL STRIP.AUTO: NEGATIVE
BUN SERPL-MCNC: 27 MG/DL (ref 7–20)
BUN SERPL-MCNC: 30 MG/DL (ref 7–20)
CALCIUM SERPL-MCNC: 8.5 MG/DL (ref 8.3–10.6)
CALCIUM SERPL-MCNC: 8.6 MG/DL (ref 8.3–10.6)
CHLORIDE SERPL-SCNC: 100 MMOL/L (ref 99–110)
CHLORIDE SERPL-SCNC: 101 MMOL/L (ref 99–110)
CLARITY UR: CLEAR
CO2 SERPL-SCNC: 22 MMOL/L (ref 21–32)
CO2 SERPL-SCNC: 24 MMOL/L (ref 21–32)
COLOR UR: YELLOW
CREAT SERPL-MCNC: 0.9 MG/DL (ref 0.8–1.3)
CREAT SERPL-MCNC: 1 MG/DL (ref 0.8–1.3)
DEPRECATED RDW RBC AUTO: 14.9 % (ref 12.4–15.4)
EOSINOPHIL # BLD: 0.1 K/UL (ref 0–0.6)
EOSINOPHIL NFR BLD: 1.3 %
EPI CELLS #/AREA URNS AUTO: 0 /HPF (ref 0–5)
GFR SERPLBLD CREATININE-BSD FMLA CKD-EPI: 78 ML/MIN/{1.73_M2}
GFR SERPLBLD CREATININE-BSD FMLA CKD-EPI: 88 ML/MIN/{1.73_M2}
GLUCOSE BLD-MCNC: 113 MG/DL (ref 70–99)
GLUCOSE SERPL-MCNC: 108 MG/DL (ref 70–99)
GLUCOSE SERPL-MCNC: 89 MG/DL (ref 70–99)
GLUCOSE UR STRIP.AUTO-MCNC: >=1000 MG/DL
HCT VFR BLD AUTO: 34.3 % (ref 40.5–52.5)
HGB BLD-MCNC: 12 G/DL (ref 13.5–17.5)
HGB UR QL STRIP.AUTO: NEGATIVE
HYALINE CASTS #/AREA URNS AUTO: 0 /LPF (ref 0–8)
INR PPP: 1.05 (ref 0.85–1.15)
KETONES UR STRIP.AUTO-MCNC: ABNORMAL MG/DL
LEUKOCYTE ESTERASE UR QL STRIP.AUTO: NEGATIVE
LYMPHOCYTES # BLD: 1.1 K/UL (ref 1–5.1)
LYMPHOCYTES NFR BLD: 12.3 %
MCH RBC QN AUTO: 34 PG (ref 26–34)
MCHC RBC AUTO-ENTMCNC: 35 G/DL (ref 31–36)
MCV RBC AUTO: 96.9 FL (ref 80–100)
MONOCYTES # BLD: 1.3 K/UL (ref 0–1.3)
MONOCYTES NFR BLD: 14.7 %
NEUTROPHILS # BLD: 6.5 K/UL (ref 1.7–7.7)
NEUTROPHILS NFR BLD: 71.3 %
NITRITE UR QL STRIP.AUTO: NEGATIVE
NT-PROBNP SERPL-MCNC: 98 PG/ML (ref 0–449)
PERFORMED ON: ABNORMAL
PH UR STRIP.AUTO: 6 [PH] (ref 5–8)
PLATELET # BLD AUTO: 190 K/UL (ref 135–450)
PMV BLD AUTO: 8.2 FL (ref 5–10.5)
POTASSIUM SERPL-SCNC: 4 MMOL/L (ref 3.5–5.1)
POTASSIUM SERPL-SCNC: 4 MMOL/L (ref 3.5–5.1)
PROT SERPL-MCNC: 6.3 G/DL (ref 6.4–8.2)
PROT UR STRIP.AUTO-MCNC: ABNORMAL MG/DL
PROTHROMBIN TIME: 13.9 SEC (ref 11.9–14.9)
RBC # BLD AUTO: 3.54 M/UL (ref 4.2–5.9)
RBC CLUMPS #/AREA URNS AUTO: 0 /HPF (ref 0–4)
SODIUM SERPL-SCNC: 134 MMOL/L (ref 136–145)
SODIUM SERPL-SCNC: 135 MMOL/L (ref 136–145)
SP GR UR STRIP.AUTO: 1.02 (ref 1–1.03)
TROPONIN, HIGH SENSITIVITY: 26 NG/L (ref 0–22)
UA COMPLETE W REFLEX CULTURE PNL UR: ABNORMAL
UA DIPSTICK W REFLEX MICRO PNL UR: YES
URN SPEC COLLECT METH UR: ABNORMAL
UROBILINOGEN UR STRIP-ACNC: 1 E.U./DL
WBC # BLD AUTO: 9.1 K/UL (ref 4–11)
WBC #/AREA URNS AUTO: 0 /HPF (ref 0–5)

## 2024-08-25 PROCEDURE — 83880 ASSAY OF NATRIURETIC PEPTIDE: CPT

## 2024-08-25 PROCEDURE — 6370000000 HC RX 637 (ALT 250 FOR IP): Performed by: PHYSICAL MEDICINE & REHABILITATION

## 2024-08-25 PROCEDURE — 36415 COLL VENOUS BLD VENIPUNCTURE: CPT

## 2024-08-25 PROCEDURE — 94760 N-INVAS EAR/PLS OXIMETRY 1: CPT

## 2024-08-25 PROCEDURE — 1280000000 HC REHAB R&B

## 2024-08-25 PROCEDURE — 81001 URINALYSIS AUTO W/SCOPE: CPT

## 2024-08-25 PROCEDURE — 94660 CPAP INITIATION&MGMT: CPT

## 2024-08-25 PROCEDURE — 80053 COMPREHEN METABOLIC PANEL: CPT

## 2024-08-25 PROCEDURE — 85610 PROTHROMBIN TIME: CPT

## 2024-08-25 PROCEDURE — 71045 X-RAY EXAM CHEST 1 VIEW: CPT

## 2024-08-25 PROCEDURE — 85025 COMPLETE CBC W/AUTO DIFF WBC: CPT

## 2024-08-25 PROCEDURE — 70450 CT HEAD/BRAIN W/O DYE: CPT

## 2024-08-25 PROCEDURE — 84484 ASSAY OF TROPONIN QUANT: CPT

## 2024-08-25 PROCEDURE — 2580000003 HC RX 258: Performed by: PHYSICAL MEDICINE & REHABILITATION

## 2024-08-25 RX ADMIN — BISACODYL 10 MG: 10 SUPPOSITORY RECTAL at 08:55

## 2024-08-25 RX ADMIN — ASPIRIN 81 MG: 81 TABLET, COATED ORAL at 07:54

## 2024-08-25 RX ADMIN — ASPIRIN 81 MG: 81 TABLET, COATED ORAL at 21:48

## 2024-08-25 RX ADMIN — CARVEDILOL 12.5 MG: 12.5 TABLET, FILM COATED ORAL at 07:53

## 2024-08-25 RX ADMIN — BUSPIRONE HYDROCHLORIDE 15 MG: 5 TABLET ORAL at 07:54

## 2024-08-25 RX ADMIN — BUPROPION HYDROCHLORIDE 150 MG: 150 TABLET, FILM COATED, EXTENDED RELEASE ORAL at 07:53

## 2024-08-25 RX ADMIN — LEVOTHYROXINE SODIUM 125 MCG: 0.12 TABLET ORAL at 05:49

## 2024-08-25 RX ADMIN — SACUBITRIL AND VALSARTAN 1 TABLET: 49; 51 TABLET, FILM COATED ORAL at 08:03

## 2024-08-25 RX ADMIN — SODIUM CHLORIDE, PRESERVATIVE FREE 10 ML: 5 INJECTION INTRAVENOUS at 09:31

## 2024-08-25 RX ADMIN — SODIUM CHLORIDE, PRESERVATIVE FREE 10 ML: 5 INJECTION INTRAVENOUS at 21:53

## 2024-08-25 RX ADMIN — SENNOSIDES AND DOCUSATE SODIUM 1 TABLET: 50; 8.6 TABLET ORAL at 07:53

## 2024-08-25 RX ADMIN — EMPAGLIFLOZIN 10 MG: 10 TABLET, FILM COATED ORAL at 07:53

## 2024-08-25 RX ADMIN — ATORVASTATIN CALCIUM 10 MG: 10 TABLET, FILM COATED ORAL at 21:48

## 2024-08-25 RX ADMIN — PANTOPRAZOLE SODIUM 40 MG: 40 TABLET, DELAYED RELEASE ORAL at 05:49

## 2024-08-25 RX ADMIN — DULOXETINE HYDROCHLORIDE 60 MG: 60 CAPSULE, DELAYED RELEASE ORAL at 07:54

## 2024-08-25 RX ADMIN — FLUTICASONE PROPIONATE 1 SPRAY: 50 SPRAY, METERED NASAL at 07:55

## 2024-08-25 RX ADMIN — BUPROPION HYDROCHLORIDE 150 MG: 150 TABLET, FILM COATED, EXTENDED RELEASE ORAL at 21:48

## 2024-08-25 RX ADMIN — CARVEDILOL 12.5 MG: 12.5 TABLET, FILM COATED ORAL at 17:11

## 2024-08-25 RX ADMIN — BUSPIRONE HYDROCHLORIDE 15 MG: 5 TABLET ORAL at 21:48

## 2024-08-25 RX ADMIN — SACUBITRIL AND VALSARTAN 1 TABLET: 49; 51 TABLET, FILM COATED ORAL at 21:48

## 2024-08-25 RX ADMIN — LORAZEPAM 0.5 MG: 0.5 TABLET ORAL at 21:48

## 2024-08-25 RX ADMIN — LORAZEPAM 0.5 MG: 0.5 TABLET ORAL at 07:55

## 2024-08-25 NOTE — PROGRESS NOTES
[facility] Inpatient Rehabilitation Progress Note    Mark Josue  8/24/2024  [id number]    Chief Complaint: S/P TKR (total knee replacement), left    Subjective:   No acute events overnight. Patient seen and examined, states that he takes his Ativan BID at home, some pain when walking.    ROS: Patient Denies Fevers/Chills, Nausea/vomiting, or Chest pain.     Objective:  Patient Vitals for the past 24 hrs:   BP Temp Temp src Pulse Resp SpO2 Weight   08/24/24 2114 128/72 97.1 °F (36.2 °C) Oral 85 17 94 % --   08/24/24 1622 111/71 -- -- 78 -- -- --   08/24/24 0930 -- -- -- 64 -- -- --   08/24/24 0851 (!) 117/55 -- -- 64 -- -- --   08/24/24 0845 (!) 117/55 (!) 96.1 °F (35.6 °C) Oral 64 16 -- --   08/24/24 0832 -- -- -- -- -- 95 % --   08/24/24 0530 -- -- -- -- -- -- 133.6 kg (294 lb 8.6 oz)       Gen: No distress, pleasant.   HEENT: Normocephalic, atraumatic.   CV: Regular rate and rhythm.   Resp: No respiratory distress.   Abd: Soft, nontender   Ext: No edema.   Neuro: Alert, oriented, appropriately interactive.    Wt Readings from Last 3 Encounters:   08/24/24 133.6 kg (294 lb 8.6 oz)   08/20/24 117.9 kg (260 lb)   08/12/24 115.7 kg (255 lb)       Laboratory data:   Lab Results   Component Value Date    WBC 9.7 08/24/2024    HGB 11.9 (L) 08/24/2024    HCT 34.1 (L) 08/24/2024    MCV 97.2 08/24/2024     08/24/2024       Lab Results   Component Value Date/Time     08/24/2024 06:06 AM    K 4.0 08/24/2024 06:06 AM    CL 96 08/24/2024 06:06 AM    CO2 23 08/24/2024 06:06 AM    BUN 29 08/24/2024 06:06 AM    CREATININE 1.0 08/24/2024 06:06 AM    GLUCOSE 89 08/24/2024 06:06 AM    CALCIUM 8.7 08/24/2024 06:06 AM        Therapy progress:       PT    Supine to Sit: Supervision or touching assistance  Sit to Supine: Supervision or touching assistance   Sit to Stand: Partial/moderate assistance  Chair/Bed to Chair Transfer: Dependent  Car Transfer:    Ambulation 10 ft:    Ambulation 50 ft:    Ambulation 150 ft:     Stairs - 1 Step:    Stairs - 4 Step:    Stairs - 12 Step:      OT    Eating:    Oral Hygiene: Setup or clean-up assistance  Bathing: Substantial/maximal assistance  Upper Body Dressing: Setup or clean-up assistance  Lower Body Dressing: Substantial/maximal assistance  Toilet Transfer: Dependent  Toilet Hygiene:  (no hygiene to complete)    Speech Therapy         ADULT DIET; Regular    Body mass index is 35.85 kg/m².    Assessment and Plan:  Impairments: Decreased left knee ROM, decreased balance      Arthritis left knee   -s/p TKA (8/20 with Dr. Deng)  -wound care per Ortho  -PT/OT    Hyponatremia  -129 8/24  -Recheck 8/25     Chronic sCHF  -continue Jardiance (sub for home Farxiga), carvedilol, Entresto  -Daily wt     CAD  -continue ASA, statin, bb     HTN, uncontrolled  -Has had some symptoms of lightheadedness with standing  -continue carvedilol, Entresto with hold parameters     Prediabetes with post op hyperglycemia  -Improving, monitor blood sugar intermittently  -Dietitian consult     Hypothyroidism  -continue levothyroxine     TAM  -cotinue CPAP     Anxiety  -continue bupropion, buspirone, duloxetine, lorazepam  -note patient takes Ativan BID     Bladder   -High risk retention   -Monitor PVRs, SC prn >400cc     Bowel   -High risk constipation   -senna+colace BID, PRN miralax, MoM, and bisacodyl supp.     Safety   -fall precautions     Pain control  -continue oxycodone prn     PPx  -DVT: ASA 81 BID x 14 days post-op per Ortho  -GI: pantoprazole     FULL CODE    Juan David Guzman DO   8/24/2024, 10:15 PM

## 2024-08-25 NOTE — CODE DOCUMENTATION
Patient to get CT head, no need for transfer to higher level of care at this time. Patient to remain on 3N, supervisor to update daughter.

## 2024-08-25 NOTE — SIGNIFICANT EVENT
Responded to code stroke called for confusion per family request. Patient alert and oriented x4. Neuro exam normal (detailed below) with NIH of 0. Noted normal BMP, CXR, and UA earlier in the day. Will check CBC. Given family concern for altered mental status, will get Head CT out of an abundance of caution. Low suspicion to find acute abnormality. Patient ok to remain in ARU.    Neuro exam:   CN II-XII intact; 5/5 strength in bilateral UE and RLE. Normal Plantar and dorsiflexion in bilateral feet. Good strength with straight leg raise with LLE, but limited by pain: Remainder of LLE motor exam which was deferred due to recent total knee surgery. Sensation to light touch in bilateral UE and LE grossly intact. Normal Finger to nose; Normal Heel to shin on RLE, LLE deferred due to surgery.      Addendum: CT Head negative

## 2024-08-25 NOTE — PLAN OF CARE
RN supervisor called pt daughter Rajani to update after code stroke called, assessed by Dr Samano, NIH negative, MD did not want stroke team called, FSBS WNL, VSS< ua sent earlier, unremarkable, additional labs sent and CT ordered, no pain medications for several days, wife here, updated, aware of tests ordered, daughter states pt did not recognize grandchildren and thought he was in [place], states noticed symptoms yesterday but did notify staff until she called back into hospital after she left today, pt alert at this time, wife at bedside, await CT.Electronically signed by Teresa Linton RN on 8/25/2024 at 4:02 PM

## 2024-08-25 NOTE — PLAN OF CARE
Problem: Discharge Planning  Goal: Discharge to home or other facility with appropriate resources  8/25/2024 1148 by Shreya Tatum LPN  Outcome: Progressing  Flowsheets (Taken 8/25/2024 1148)  Discharge to home or other facility with appropriate resources:   Identify barriers to discharge with patient and caregiver   Identify discharge learning needs (meds, wound care, etc)     Problem: Safety - Adult  Goal: Free from fall injury  8/25/2024 1148 by Shreya Tatum LPN  Outcome: Progressing  Flowsheets (Taken 8/25/2024 1148)  Free From Fall Injury: Instruct family/caregiver on patient safety     Problem: Pain  Goal: Verbalizes/displays adequate comfort level or baseline comfort level  8/25/2024 1148 by Shreya Tatum LPN  Outcome: Progressing  Flowsheets (Taken 8/25/2024 1148)  Verbalizes/displays adequate comfort level or baseline comfort level:   Encourage patient to monitor pain and request assistance   Assess pain using appropriate pain scale     Problem: ABCDS Injury Assessment  Goal: Absence of physical injury  8/25/2024 1148 by Shreya Tatum LPN  Outcome: Progressing  Flowsheets (Taken 8/25/2024 1148)  Absence of Physical Injury: Implement safety measures based on patient assessment

## 2024-08-25 NOTE — PROGRESS NOTES
atient admitted to rehab with left total knee replacement.  A/Ox4. Transfers with stedy x 2. Mobility restrictions: WBAT. On Regular diet, tolerating well. Medications taken whole with thin liquids. On ASA for DVT prophylaxis.  Skin: surgical incision to left knee. Oxygen: RA. LDA: PIV RFA. Has been continent of bowel and incontinent at times of bladder. LBM 8/18 (patient given Miralax, BM cocktail and MOM 8/24). Chair/bed alarms in use and call light in reach. Will monitor for safety.

## 2024-08-25 NOTE — PROGRESS NOTES
Patient admitted to rehab with total knee replacement.  A/Ox4 with confused conversation at times. Transfers with stedy x1-2. Mobility restrictions: WBAT. On Regular diet, tolerating well. Medications taken whole with thins one at a time. On ASA for DVT prophylaxis.  Skin: surgical incision left knee. Oxygen: RA. LDA: PIV RFA. Has been continent of stool, last BM today X 2 after receiving dulcolax supp. Is incontinent of urine at times. Chair/bed alarms in use and call light in reach. Will monitor for safety.

## 2024-08-26 ENCOUNTER — TELEPHONE (OUTPATIENT)
Dept: ORTHOPEDIC SURGERY | Age: 79
End: 2024-08-26

## 2024-08-26 LAB
ANION GAP SERPL CALCULATED.3IONS-SCNC: 11 MMOL/L (ref 3–16)
BASOPHILS # BLD: 0 K/UL (ref 0–0.2)
BASOPHILS NFR BLD: 0.5 %
BUN SERPL-MCNC: 21 MG/DL (ref 7–20)
CALCIUM SERPL-MCNC: 8.8 MG/DL (ref 8.3–10.6)
CHLORIDE SERPL-SCNC: 101 MMOL/L (ref 99–110)
CO2 SERPL-SCNC: 23 MMOL/L (ref 21–32)
CREAT SERPL-MCNC: 0.9 MG/DL (ref 0.8–1.3)
DEPRECATED RDW RBC AUTO: 14.7 % (ref 12.4–15.4)
EOSINOPHIL # BLD: 0.1 K/UL (ref 0–0.6)
EOSINOPHIL NFR BLD: 2.1 %
GFR SERPLBLD CREATININE-BSD FMLA CKD-EPI: 88 ML/MIN/{1.73_M2}
GLUCOSE SERPL-MCNC: 103 MG/DL (ref 70–99)
HCT VFR BLD AUTO: 34.7 % (ref 40.5–52.5)
HGB BLD-MCNC: 12.3 G/DL (ref 13.5–17.5)
LYMPHOCYTES # BLD: 0.9 K/UL (ref 1–5.1)
LYMPHOCYTES NFR BLD: 13.1 %
MCH RBC QN AUTO: 34.1 PG (ref 26–34)
MCHC RBC AUTO-ENTMCNC: 35.5 G/DL (ref 31–36)
MCV RBC AUTO: 96.2 FL (ref 80–100)
MONOCYTES # BLD: 1 K/UL (ref 0–1.3)
MONOCYTES NFR BLD: 15.3 %
NEUTROPHILS # BLD: 4.7 K/UL (ref 1.7–7.7)
NEUTROPHILS NFR BLD: 69 %
PLATELET # BLD AUTO: 198 K/UL (ref 135–450)
PMV BLD AUTO: 8.3 FL (ref 5–10.5)
POTASSIUM SERPL-SCNC: 3.7 MMOL/L (ref 3.5–5.1)
RBC # BLD AUTO: 3.61 M/UL (ref 4.2–5.9)
SODIUM SERPL-SCNC: 135 MMOL/L (ref 136–145)
WBC # BLD AUTO: 6.7 K/UL (ref 4–11)

## 2024-08-26 PROCEDURE — 6370000000 HC RX 637 (ALT 250 FOR IP): Performed by: PHYSICAL MEDICINE & REHABILITATION

## 2024-08-26 PROCEDURE — 97535 SELF CARE MNGMENT TRAINING: CPT

## 2024-08-26 PROCEDURE — 97530 THERAPEUTIC ACTIVITIES: CPT

## 2024-08-26 PROCEDURE — 85025 COMPLETE CBC W/AUTO DIFF WBC: CPT

## 2024-08-26 PROCEDURE — 80048 BASIC METABOLIC PNL TOTAL CA: CPT

## 2024-08-26 PROCEDURE — 1280000000 HC REHAB R&B

## 2024-08-26 PROCEDURE — 2580000003 HC RX 258: Performed by: PHYSICAL MEDICINE & REHABILITATION

## 2024-08-26 PROCEDURE — 97110 THERAPEUTIC EXERCISES: CPT

## 2024-08-26 PROCEDURE — 94760 N-INVAS EAR/PLS OXIMETRY 1: CPT

## 2024-08-26 PROCEDURE — 36415 COLL VENOUS BLD VENIPUNCTURE: CPT

## 2024-08-26 PROCEDURE — 97542 WHEELCHAIR MNGMENT TRAINING: CPT

## 2024-08-26 RX ORDER — ACETAMINOPHEN 325 MG/1
650 TABLET ORAL EVERY 6 HOURS PRN
Status: DISPENSED | OUTPATIENT
Start: 2024-08-26

## 2024-08-26 RX ORDER — TRAMADOL HYDROCHLORIDE 50 MG/1
50 TABLET ORAL EVERY 6 HOURS PRN
Status: DISPENSED | OUTPATIENT
Start: 2024-08-26

## 2024-08-26 RX ADMIN — BUSPIRONE HYDROCHLORIDE 15 MG: 5 TABLET ORAL at 08:02

## 2024-08-26 RX ADMIN — EMPAGLIFLOZIN 10 MG: 10 TABLET, FILM COATED ORAL at 08:03

## 2024-08-26 RX ADMIN — CARVEDILOL 12.5 MG: 12.5 TABLET, FILM COATED ORAL at 17:08

## 2024-08-26 RX ADMIN — SENNOSIDES AND DOCUSATE SODIUM 1 TABLET: 50; 8.6 TABLET ORAL at 19:59

## 2024-08-26 RX ADMIN — ACETAMINOPHEN 325MG 650 MG: 325 TABLET ORAL at 12:48

## 2024-08-26 RX ADMIN — BUPROPION HYDROCHLORIDE 150 MG: 150 TABLET, FILM COATED, EXTENDED RELEASE ORAL at 08:03

## 2024-08-26 RX ADMIN — ASPIRIN 81 MG: 81 TABLET, COATED ORAL at 20:00

## 2024-08-26 RX ADMIN — PANTOPRAZOLE SODIUM 40 MG: 40 TABLET, DELAYED RELEASE ORAL at 05:39

## 2024-08-26 RX ADMIN — FLUTICASONE PROPIONATE 1 SPRAY: 50 SPRAY, METERED NASAL at 08:07

## 2024-08-26 RX ADMIN — LEVOTHYROXINE SODIUM 125 MCG: 0.12 TABLET ORAL at 05:39

## 2024-08-26 RX ADMIN — DULOXETINE HYDROCHLORIDE 60 MG: 60 CAPSULE, DELAYED RELEASE ORAL at 08:03

## 2024-08-26 RX ADMIN — LORAZEPAM 0.5 MG: 0.5 TABLET ORAL at 08:03

## 2024-08-26 RX ADMIN — LORAZEPAM 0.5 MG: 0.5 TABLET ORAL at 20:00

## 2024-08-26 RX ADMIN — ATORVASTATIN CALCIUM 10 MG: 10 TABLET, FILM COATED ORAL at 19:59

## 2024-08-26 RX ADMIN — ASPIRIN 81 MG: 81 TABLET, COATED ORAL at 08:03

## 2024-08-26 RX ADMIN — SODIUM CHLORIDE, PRESERVATIVE FREE 10 ML: 5 INJECTION INTRAVENOUS at 19:59

## 2024-08-26 RX ADMIN — SACUBITRIL AND VALSARTAN 1 TABLET: 49; 51 TABLET, FILM COATED ORAL at 19:59

## 2024-08-26 RX ADMIN — BUPROPION HYDROCHLORIDE 150 MG: 150 TABLET, FILM COATED, EXTENDED RELEASE ORAL at 20:00

## 2024-08-26 RX ADMIN — BUSPIRONE HYDROCHLORIDE 15 MG: 5 TABLET ORAL at 20:00

## 2024-08-26 RX ADMIN — SACUBITRIL AND VALSARTAN 1 TABLET: 49; 51 TABLET, FILM COATED ORAL at 08:02

## 2024-08-26 RX ADMIN — CARVEDILOL 12.5 MG: 12.5 TABLET, FILM COATED ORAL at 08:03

## 2024-08-26 ASSESSMENT — PAIN DESCRIPTION - ONSET
ONSET: ON-GOING
ONSET: ON-GOING

## 2024-08-26 ASSESSMENT — PAIN DESCRIPTION - PAIN TYPE
TYPE: SURGICAL PAIN
TYPE: SURGICAL PAIN

## 2024-08-26 ASSESSMENT — PAIN DESCRIPTION - DESCRIPTORS
DESCRIPTORS: ACHING

## 2024-08-26 ASSESSMENT — PAIN DESCRIPTION - FREQUENCY
FREQUENCY: CONTINUOUS
FREQUENCY: CONTINUOUS

## 2024-08-26 ASSESSMENT — PAIN DESCRIPTION - LOCATION
LOCATION: KNEE

## 2024-08-26 ASSESSMENT — PAIN DESCRIPTION - ORIENTATION
ORIENTATION: LEFT

## 2024-08-26 ASSESSMENT — PAIN SCALES - GENERAL
PAINLEVEL_OUTOF10: 6
PAINLEVEL_OUTOF10: 3
PAINLEVEL_OUTOF10: 2
PAINLEVEL_OUTOF10: 3

## 2024-08-26 NOTE — PROGRESS NOTES
Occupational Therapy  Facility/Department: [address] REHAB  Rehabilitation Occupational Therapy Daily Treatment Note    Date: 24  Patient Name: Mark Josue       Room: A0L-6502/3261-01  MRN: [id number]  Account: [id number]   : 1945  (79 y.o.) Gender: male                    Past Medical History:  has a past medical history of Anxiety, CAD (coronary artery disease), Depression, GERD (gastroesophageal reflux disease), Hyperlipidemia, Hypertension, TAM on CPAP, Thyroid disease, and Wears glasses.  Past Surgical History:   has a past surgical history that includes Knee arthroscopy (Right); Shoulder arthroscopy (Left, 15 years ago); Cardiac surgery (); Cholecystectomy (18 years ago); Tibia fracture surgery (Left, 2015); Cataract extraction (Bilateral); Colonoscopy (); and Total knee arthroplasty (Left, 2024).    Restrictions  Restrictions/Precautions: Fall Risk, Weight Bearing  Left Lower Extremity Weight Bearing: Weight Bearing As Tolerated    Subjective  Subjective: Pt met in room, on toilet with wife present. Pt reporting pain in L knee 3/10 at rest and 6/10 with movement. Pt agreeable to OT treat.  Restrictions/Precautions: Fall Risk;Weight Bearing             Objective     Cognition  Overall Cognitive Status: WFL  Orientation  Overall Orientation Status: Within Functional Limits         ADL  Toileting  Assistance Level: Moderate assistance  Skilled Clinical Factors: Pt stood from toilet > Pablito stedy Min A. Pt w/ BM smear, OT assisted with posterior hygiene. Pt managed clothing with Min A from OT.  Toilet Transfers  Skilled Clinical Factors: DEP >< toilet via pablito stedy. Pt on toilet upon OT arrrival. He stood from toilet > pablito Stedy Min A.              OT Exercises  Exercise Treatment: Pt seated at table completed arm bike to improve activity tolerance/UE strength. Pt tolerated 4 min on resistance 1, good tolerance. Noted to be closing eyes throughout  Static Standing Balance

## 2024-08-26 NOTE — PROGRESS NOTES
[facility] Inpatient Rehabilitation Progress Note    Mark Josue  8/25/2024  [id number]    Chief Complaint: S/P TKR (total knee replacement), left    Subjective:   Patient states that he is feeling well and denies any new onset complaints.     Patient family called in this afternoon, expressed worries about patient fluctuation in cognitive status, requesting a code stroke be called.    ROS: Patient Denies Fevers/Chills, Nausea/vomiting, or Chest pain.     Objective:  Patient Vitals for the past 24 hrs:   BP Temp Temp src Pulse Resp SpO2 Weight   08/25/24 2145 139/84 98.1 °F (36.7 °C) Oral 79 18 96 % --   08/25/24 1711 (!) 149/87 -- -- 81 -- -- --   08/25/24 1537 (!) 155/80 -- -- 78 17 95 % --   08/25/24 1152 -- -- -- -- -- 95 % --   08/25/24 0745 (!) 146/77 97.5 °F (36.4 °C) Oral 85 18 95 % --   08/25/24 0356 -- -- -- -- -- -- 132.9 kg (292 lb 15.9 oz)       Gen: No distress, pleasant.   HEENT: Normocephalic, atraumatic.   CV: Regular rate and rhythm.   Resp: No respiratory distress.   Abd: Soft, nontender   Ext: No edema.   Neuro: Alert, oriented, appropriately interactive.    Wt Readings from Last 3 Encounters:   08/25/24 132.9 kg (292 lb 15.9 oz)   08/20/24 117.9 kg (260 lb)   08/12/24 115.7 kg (255 lb)       Laboratory data:   Lab Results   Component Value Date    WBC 9.1 08/25/2024    HGB 12.0 (L) 08/25/2024    HCT 34.3 (L) 08/25/2024    MCV 96.9 08/25/2024     08/25/2024       Lab Results   Component Value Date/Time     08/25/2024 03:46 PM    K 4.0 08/25/2024 03:46 PM     08/25/2024 03:46 PM    CO2 24 08/25/2024 03:46 PM    BUN 27 08/25/2024 03:46 PM    CREATININE 0.9 08/25/2024 03:46 PM    GLUCOSE 108 08/25/2024 03:46 PM    CALCIUM 8.5 08/25/2024 03:46 PM        Therapy progress:       PT    Supine to Sit: Supervision or touching assistance  Sit to Supine: Supervision or touching assistance   Sit to Stand: Partial/moderate assistance  Chair/Bed to Chair Transfer: Dependent  Car

## 2024-08-26 NOTE — PROGRESS NOTES
Patient admitted to rehab with an elective left total knee. A/O x 4. Transfers with stedy x 1. Mobility restrictions: None. On regular diet, tolerating well. Medications taken whole with thin liquids, without swallowing difficulty. On knee high lenore hose for DVT prophylaxis.  Skin: with surgical incision to the left knee with prineo, no s/s of infection. Patient using ice machine for swelling and comfort. Oxygen: RA. LDA: PIV to RFA, flushes easily with dressing intact. Has been continent of urine, using urinal then staff empties. LBM 8/25. Chair/bed alarms in use and fall precautions in place. Will continue to monitor and assist as needed.

## 2024-08-26 NOTE — CARE COORDINATION
Chart reviewed.  Met with patient to introduce  role, initiate discussion regarding DC planning and to inform of weekly Team Conferences.                                 SOCIAL WORK ASSESSMENT      GOAL:   To get stronger and to return home.       HOME SITUATION:  Pt and wife live in a mobile home with 5 + 1 steps to enter one floor living.   Prior to admit, he was totally independent with driving, personal care needs and shares in some household tasks.  He oversees his own medications.    Wife works part time a few hours each day.  He is retired.       [facility]cp:   [facility]     [facility]   [facility] in [place]        PRIOR LEVEL OF FUNCTIONING:       PERSONAL CARE:    ind                                                                       DRIVES:  yes                                                                     FINANCES:  shares                                                                     MEALS:      shares                                                                                                GROCERY SHOPS:  shares      DME CURRENTLY AT HOME:  CPAP, walk in shower with seat with a back, bars and HH [place], toilet raiser, cane, reacher, lift chair and wh walker       CURRENT HOME CARE/SERVICES:  none currently.  Informed him of possible post acute services such as home care or out patient services t o continue his progress.  [facility].        PREFERRED HOME CARE:  [facility].       TEAM CONFERENCE DAY:Thursdays.  Informed him of weekly Team Conferences where Team will review progress, barriers, DME recommendations and DC date. This worker will update him weekly and plan for DC needs.  He gives permission to update his wife as well.      LSW informed patient of preferred  time on date of discharge which is between 10 - 12 noon.      LSW informed

## 2024-08-26 NOTE — PLAN OF CARE
Problem: Discharge Planning  Goal: Discharge to home or other facility with appropriate resources  Outcome: Progressing  Flowsheets  Taken 8/26/2024 0850 by Ashlie Roman RN  Discharge to home or other facility with appropriate resources: Identify barriers to discharge with patient and caregiver    Problem: Safety - Adult  Goal: Free from fall injury  8/26/2024 0850 by Ashlie Roman RN  Outcome: Progressing  Flowsheets (Taken 8/26/2024 0850)  Free From Fall Injury:   Instruct family/caregiver on patient safety   Based on caregiver fall risk screen, instruct family/caregiver to ask for assistance with transferring infant if caregiver noted to have fall risk factors     Problem: Pain  Goal: Verbalizes/displays adequate comfort level or baseline comfort level  8/26/2024 0850 by Ashlie Roman RN  Outcome: Progressing  Flowsheets (Taken 8/26/2024 0850)  Verbalizes/displays adequate comfort level or baseline comfort level: Encourage patient to monitor pain and request assistance     Problem: ABCDS Injury Assessment  Goal: Absence of physical injury  8/26/2024 0850 by Ashlie Roman RN  Outcome: Progressing  Flowsheets (Taken 8/26/2024 0850)  Absence of Physical Injury: Implement safety measures based on patient assessment     Problem: Skin/Tissue Integrity  Goal: Absence of new skin breakdown  Description: 1.  Monitor for areas of redness and/or skin breakdown  2.  Assess vascular access sites hourly  3.  Every 4-6 hours minimum:  Change oxygen saturation probe site  4.  Every 4-6 hours:  If on nasal continuous positive airway pressure, respiratory therapy assess nares and determine need for appliance change or resting period.  8/26/2024 0850 by Ashlie Roman RN  Outcome: Progressing  Note: Monitoring patients incision, sacrum/buttocks for deterioration or improvement. Offloading for patient care as well as intermittent ice therapy for surgical area.

## 2024-08-26 NOTE — PROGRESS NOTES
Department of Physical Medicine & Rehabilitation  Progress Note    Patient Identification:  Mark Josue  [id number]  : 1945  Admit date: 2024    Chief Complaint: S/P TKR (total knee replacement), left    Subjective:   No acute events overnight.   Patient seen this afternoon sitting up in gym. He reports feeling better today. He recalls being confused yesterday and experiencing possible hallucinations. Has not had these symptoms today. Of note, he has not taken any oxycodone today. Does have severe pain with standing activities.   I updated spouse at the bedside. Provided education on testing done yesterday and potential causes of patient's confusion over the weekend.   Labs reviewed.     ROS: No f/c, n/v, cp     Objective:  Patient Vitals for the past 24 hrs:   BP Temp Temp src Pulse Resp SpO2 Weight   24 0800 (!) 144/74 98 °F (36.7 °C) Oral 90 18 95 % --   24 0602 -- -- -- -- -- -- 115.4 kg (254 lb 6.6 oz)   24 2321 -- -- -- -- 18 -- --   24 2145 139/84 98.1 °F (36.7 °C) Oral 79 18 96 % --   24 1711 (!) 149/87 -- -- 81 -- -- --   24 1537 (!) 155/80 -- -- 78 17 95 % --   24 1152 -- -- -- -- -- 95 % --     Const: Alert. No distress, pleasant.   HEENT: Normocephalic, atraumatic. Normal sclera/conjunctiva. MMM.   CV: Regular rate and rhythm.   Resp: No respiratory distress. Lungs CTAB.   Abd: Soft, nontender, nondistended, NABS+   Ext/MSK: Left knee post op swelling and decreased ROM  Neuro: Alert, oriented, appropriately interactive.   Psych: Cooperative, appropriate mood and affect    Laboratory data: Available via EMR.   Last 24 hour lab  Recent Results (from the past 24 hour(s))   Urinalysis with Reflex to Culture    Collection Time: 24  1:15 PM    Specimen: Urine   Result Value Ref Range    Color, UA Yellow Straw/Yellow    Clarity, UA Clear Clear    Glucose, Ur >=1000 (A) Negative mg/dL    Bilirubin, Urine Negative Negative    Ketones, Urine TRACE  20 mg/dL    Creatinine 0.9 0.8 - 1.3 mg/dL    Est, Glom Filt Rate 88 >60    Calcium 8.5 8.3 - 10.6 mg/dL    Total Protein 6.3 (L) 6.4 - 8.2 g/dL    Albumin 3.2 (L) 3.4 - 5.0 g/dL    Albumin/Globulin Ratio 1.0 (L) 1.1 - 2.2    Total Bilirubin 0.5 0.0 - 1.0 mg/dL    Alkaline Phosphatase 67 40 - 129 U/L    ALT 48 (H) 10 - 40 U/L    AST 69 (H) 15 - 37 U/L   Brain Natriuretic Peptide    Collection Time: 08/25/24  3:46 PM   Result Value Ref Range    Pro-BNP 98 0 - 449 pg/mL   Protime-INR    Collection Time: 08/25/24  4:23 PM   Result Value Ref Range    Protime 13.9 11.9 - 14.9 sec    INR 1.05 0.85 - 1.15   CBC with Auto Differential    Collection Time: 08/26/24  6:08 AM   Result Value Ref Range    WBC 6.7 4.0 - 11.0 K/uL    RBC 3.61 (L) 4.20 - 5.90 M/uL    Hemoglobin 12.3 (L) 13.5 - 17.5 g/dL    Hematocrit 34.7 (L) 40.5 - 52.5 %    MCV 96.2 80.0 - 100.0 fL    MCH 34.1 (H) 26.0 - 34.0 pg    MCHC 35.5 31.0 - 36.0 g/dL    RDW 14.7 12.4 - 15.4 %    Platelets 198 135 - 450 K/uL    MPV 8.3 5.0 - 10.5 fL    Neutrophils % 69.0 %    Lymphocytes % 13.1 %    Monocytes % 15.3 %    Eosinophils % 2.1 %    Basophils % 0.5 %    Neutrophils Absolute 4.7 1.7 - 7.7 K/uL    Lymphocytes Absolute 0.9 (L) 1.0 - 5.1 K/uL    Monocytes Absolute 1.0 0.0 - 1.3 K/uL    Eosinophils Absolute 0.1 0.0 - 0.6 K/uL    Basophils Absolute 0.0 0.0 - 0.2 K/uL   Basic Metabolic Panel w/ Reflex to MG    Collection Time: 08/26/24  6:08 AM   Result Value Ref Range    Sodium 135 (L) 136 - 145 mmol/L    Potassium reflex Magnesium 3.7 3.5 - 5.1 mmol/L    Chloride 101 99 - 110 mmol/L    CO2 23 21 - 32 mmol/L    Anion Gap 11 3 - 16    Glucose 103 (H) 70 - 99 mg/dL    BUN 21 (H) 7 - 20 mg/dL    Creatinine 0.9 0.8 - 1.3 mg/dL    Est, Glom Filt Rate 88 >60    Calcium 8.8 8.3 - 10.6 mg/dL       Therapy progress:  Physical therapy:  Bed Mobility:     Sit>supine:     Supine>sit:     Transfers:     Sit>stand:     Stand>sit:     Bed<>chair     Stand Pivot:     Lateral

## 2024-08-26 NOTE — PROGRESS NOTES
Physical Therapy  Facility/Department: [address] [facility]  Rehabilitation Physical Therapy Treatment Note    NAME: Mark Josue  : 1945 (79 y.o.)  MRN: [id number]  CODE STATUS: Full Code    Date of Service: 24       Restrictions:  Restrictions/Precautions: Fall Risk, Weight Bearing  Lower Extremity Weight Bearing Restrictions  Left Lower Extremity Weight Bearing: Weight Bearing As Tolerated     SUBJECTIVE  Subjective  Subjective: Patient states that he is doing okay with sleep - pain relief with rest, ice, pain meds. States that he feels like he was hallucinating a few days ago (effect of meds?)  Pain: 6/10 L knee pain    OBJECTIVE  Cognition  Overall Cognitive Status: WFL  Orientation  Overall Orientation Status: Within Functional Limits    Functional Mobility  Standing Balance  Time: 2 mins  Activity: Stedy  Comments: Cues for posture - patient c/o LLE soreness during stand  Transfers  Additional Factors: Set-up;Verbal cues;Increased time to complete  Sit to Stand  Assistance Level: Moderate assistance  Skilled Clinical Factors: hand placement for Stedy  Stand to Sit  Assistance Level: Minimal assistance      Environmental Mobility  Ambulation  Skilled Clinical Factors: w/c is too wide to fit inside // bars - deferred to PM session    W/c mobility using BUEs ~150 ft with two turns and navigating thresholds with slight increased time, no cues for sequence - Supervision. Cues for managing w/c brakes.         PT Exercises  A/AROM Exercises: AAROM L knee flexion x 3 mins (up to 85 deg), quad sets x 10. B x 10 LAQ    ASSESSMENT/PROGRESS TOWARDS GOALS       Assessment  Assessment: Pt is a 79 y.o. M. admitted  s/p L TKA at [facility]. Patient presents with improved cognition this date, following code stroke called yesterday by family. Imaging negative. L knee ROM progressing up to 85 degs, still lacking full extension but good quad contractions with sets. Limited by pain, standing tolerance and weight  strength/ROM  Education Method: Demonstration;Verbal  Education Outcome: Continued education needed    PHYSICAL THERAPY  Progress Note   Second Session    Subjective: Patient supine in bed upon arrival following lunch - minimal complaints. Spouse present. Ice pack donned - stiff and increase in R knee pain - 4/10 at rest, 8/10 with movement. Requested RN for Tylenol.    Objective: Supine <> sit at SBA (HOB elevated) and sit <> stand from elevated EOB at Mod A with cues for hand placement. Stood at RW for 1 min. Able to perform stand stepping transfer EOB <> wheelchair w/ RW at Min A for walker/trunk support. No LOB.    Taken down to therapy gym. Stand pivot transfer from w/c <> NuStep seat with cues for hand placement.    NuStep to improve AROM and strength. Seat/BUE at 12/11 and resistance at 2. AAROM L knee up to 90 degrees with cues to slow down and hold flexion.    Returned to the chair via stand pivot - increased B knee flexion but no knee buckle. BLEs fatigued. Rest break prior to OT session.    Assessment: Patient progressed with x3 stand pivot transfers w/ RW, Min-Mod A x 1. With BLE fatigue, patient demonstrates increased knee flexion and risk of buckling - would benefit from cotx tomorrow to progress gait training.      Safety Device - Type of devices:  []  All fall risk precautions in place [] Bed alarm in place  [] Call light within reach [] Chair alarm in place [] Positioning belt [x] Gait belt [x] Patient at risk for falls [] Left in bed [x] Left in chair [] Telesitter in use [] Sitter present [] Nurse notified [x]  Care transferred to OT [] Returned to room w/ transport      Therapy Time   Individual Co-treatment   Time In 1230     Time Out 1315     Minutes 45         Electronically signed by Han García PT on 8/26/2024 at 1:14 PM        Therapy Time   Individual Concurrent Group Co-treatment   Time In 0815         Time Out 0900         Minutes 45           Timed Code Treatment Minutes: 45

## 2024-08-26 NOTE — CONSULTS
Comprehensive Nutrition Assessment    Type and Reason for Visit:  Initial, Consult    Nutrition Recommendations/Plan:   Continue regular diet  Monitor wt trend     Malnutrition Assessment:  Malnutrition Status:  At risk for malnutrition (Comment) (08/26/24 1049)    Context:  Acute Illness     Findings of the 6 clinical characteristics of malnutrition:  Energy Intake:  No significant decrease in energy intake  Weight Loss:  Unable to assess     Body Fat Loss:  No significant body fat loss     Muscle Mass Loss:  No significant muscle mass loss    Fluid Accumulation:  Mild Extremities   Strength:  Not Performed    Nutrition Assessment:    Consult per ARU protocol. PMH includes; CAD, GERD, HLD, HTN, Anxiety. Pt is s/p left TKR on 8/20/24 and has been adm to rehab r/t debility. Diet adv to regular and pt endorsed a good po intake / appetite. Pt declined need for ONS at this time. Current regimen meeting est nutrition needs. When asked about UBW, pt reported a range of 250-260 lbs. Noted ARU adm wt at 296 lbs 11.8 oz. Current wt now at 254 lbs 6.6 oz. Question accuracy. Will monitor wt trend. Will continue to follow progress.    Nutrition Related Findings:    Labs reviewed. Noted Na improved at 135. Noted BUN improved at 21. Noted BM 8/25. Noted non-pitting edema to LLE. Wound Type: Surgical Incision (no issues noted)       Current Nutrition Intake & Therapies:    Average Meal Intake: 26-50%, 51-75%, %     ADULT DIET; Regular    Anthropometric Measures:  Height: 193 cm (6' 4\")  Ideal Body Weight (IBW): 202 lbs (92 kg)    Admission Body Weight: 134.7 kg (297 lb)  Current Body Weight: 115.2 kg (254 lb),   IBW. Weight Source: Bed Scale  Current BMI (kg/m2): 30.9  Usual Body Weight:  (250-260 lbs)                       BMI Categories: Obese Class 1 (BMI 30.0-34.9)    Estimated Daily Nutrient Needs:        Energy (kcal/day): 3436-7953 (15-18 x  kg)     Protein (g/day): 110-138 (1.2-1.5 x IBW 92 kg)  Method

## 2024-08-26 NOTE — PLAN OF CARE
Problem: Safety - Adult  Goal: Free from fall injury  Outcome: Progressing     Problem: Pain  Goal: Verbalizes/displays adequate comfort level or baseline comfort level  Outcome: Progressing     Problem: ABCDS Injury Assessment  Goal: Absence of physical injury  Outcome: Progressing  Flowsheets (Taken 8/25/2024 8872)  Absence of Physical Injury: Implement safety measures based on patient assessment     Problem: Skin/Tissue Integrity  Goal: Absence of new skin breakdown  Description: 1.  Monitor for areas of redness and/or skin breakdown  2.  Assess vascular access sites hourly  3.  Every 4-6 hours minimum:  Change oxygen saturation probe site  4.  Every 4-6 hours:  If on nasal continuous positive airway pressure, respiratory therapy assess nares and determine need for appliance change or resting period.  Outcome: Progressing

## 2024-08-26 NOTE — TELEPHONE ENCOUNTER
General Question     Subject: PATIENT IS NOT LUCID AND THEY DID A CT SCAN. WIFE IS CONCERNED HE IS LOSING HIS MEMORY.  Patient and /or Facility Request: A[facility] Pearl   Contact Number: [phone]

## 2024-08-27 PROCEDURE — 97112 NEUROMUSCULAR REEDUCATION: CPT

## 2024-08-27 PROCEDURE — 94660 CPAP INITIATION&MGMT: CPT

## 2024-08-27 PROCEDURE — 2580000003 HC RX 258: Performed by: PHYSICAL MEDICINE & REHABILITATION

## 2024-08-27 PROCEDURE — 97530 THERAPEUTIC ACTIVITIES: CPT

## 2024-08-27 PROCEDURE — 6370000000 HC RX 637 (ALT 250 FOR IP): Performed by: STUDENT IN AN ORGANIZED HEALTH CARE EDUCATION/TRAINING PROGRAM

## 2024-08-27 PROCEDURE — 6370000000 HC RX 637 (ALT 250 FOR IP): Performed by: PHYSICAL MEDICINE & REHABILITATION

## 2024-08-27 PROCEDURE — 97110 THERAPEUTIC EXERCISES: CPT

## 2024-08-27 PROCEDURE — 1280000000 HC REHAB R&B

## 2024-08-27 PROCEDURE — 97116 GAIT TRAINING THERAPY: CPT

## 2024-08-27 RX ADMIN — BUSPIRONE HYDROCHLORIDE 15 MG: 5 TABLET ORAL at 19:41

## 2024-08-27 RX ADMIN — LORAZEPAM 0.5 MG: 0.5 TABLET ORAL at 08:09

## 2024-08-27 RX ADMIN — BUPROPION HYDROCHLORIDE 150 MG: 150 TABLET, FILM COATED, EXTENDED RELEASE ORAL at 08:09

## 2024-08-27 RX ADMIN — TRAMADOL HYDROCHLORIDE 50 MG: 50 TABLET ORAL at 05:19

## 2024-08-27 RX ADMIN — SACUBITRIL AND VALSARTAN 1 TABLET: 49; 51 TABLET, FILM COATED ORAL at 09:26

## 2024-08-27 RX ADMIN — FLUTICASONE PROPIONATE 1 SPRAY: 50 SPRAY, METERED NASAL at 09:26

## 2024-08-27 RX ADMIN — TRAMADOL HYDROCHLORIDE 50 MG: 50 TABLET ORAL at 11:49

## 2024-08-27 RX ADMIN — DULOXETINE HYDROCHLORIDE 60 MG: 60 CAPSULE, DELAYED RELEASE ORAL at 08:12

## 2024-08-27 RX ADMIN — LORAZEPAM 0.5 MG: 0.5 TABLET ORAL at 19:41

## 2024-08-27 RX ADMIN — SENNOSIDES AND DOCUSATE SODIUM 1 TABLET: 50; 8.6 TABLET ORAL at 19:41

## 2024-08-27 RX ADMIN — ATORVASTATIN CALCIUM 10 MG: 10 TABLET, FILM COATED ORAL at 19:41

## 2024-08-27 RX ADMIN — SACUBITRIL AND VALSARTAN 1 TABLET: 49; 51 TABLET, FILM COATED ORAL at 21:27

## 2024-08-27 RX ADMIN — ASPIRIN 81 MG: 81 TABLET, COATED ORAL at 19:41

## 2024-08-27 RX ADMIN — CARVEDILOL 12.5 MG: 12.5 TABLET, FILM COATED ORAL at 16:34

## 2024-08-27 RX ADMIN — EMPAGLIFLOZIN 10 MG: 10 TABLET, FILM COATED ORAL at 13:48

## 2024-08-27 RX ADMIN — CARVEDILOL 12.5 MG: 12.5 TABLET, FILM COATED ORAL at 08:09

## 2024-08-27 RX ADMIN — BUPROPION HYDROCHLORIDE 150 MG: 150 TABLET, FILM COATED, EXTENDED RELEASE ORAL at 19:41

## 2024-08-27 RX ADMIN — SENNOSIDES AND DOCUSATE SODIUM 1 TABLET: 50; 8.6 TABLET ORAL at 08:09

## 2024-08-27 RX ADMIN — LEVOTHYROXINE SODIUM 125 MCG: 0.12 TABLET ORAL at 05:19

## 2024-08-27 RX ADMIN — PANTOPRAZOLE SODIUM 40 MG: 40 TABLET, DELAYED RELEASE ORAL at 05:19

## 2024-08-27 RX ADMIN — ASPIRIN 81 MG: 81 TABLET, COATED ORAL at 08:09

## 2024-08-27 RX ADMIN — BUSPIRONE HYDROCHLORIDE 15 MG: 5 TABLET ORAL at 08:09

## 2024-08-27 RX ADMIN — SODIUM CHLORIDE, PRESERVATIVE FREE 10 ML: 5 INJECTION INTRAVENOUS at 13:49

## 2024-08-27 ASSESSMENT — PAIN - FUNCTIONAL ASSESSMENT
PAIN_FUNCTIONAL_ASSESSMENT: ACTIVITIES ARE NOT PREVENTED

## 2024-08-27 ASSESSMENT — PAIN DESCRIPTION - ORIENTATION
ORIENTATION: LEFT

## 2024-08-27 ASSESSMENT — PAIN SCALES - GENERAL
PAINLEVEL_OUTOF10: 2
PAINLEVEL_OUTOF10: 4
PAINLEVEL_OUTOF10: 7
PAINLEVEL_OUTOF10: 2

## 2024-08-27 ASSESSMENT — PAIN DESCRIPTION - FREQUENCY
FREQUENCY: CONTINUOUS

## 2024-08-27 ASSESSMENT — PAIN DESCRIPTION - LOCATION
LOCATION: KNEE

## 2024-08-27 ASSESSMENT — PAIN DESCRIPTION - DESCRIPTORS
DESCRIPTORS: ACHING
DESCRIPTORS: ACHING
DESCRIPTORS: DULL
DESCRIPTORS: ACHING

## 2024-08-27 ASSESSMENT — PAIN DESCRIPTION - ONSET
ONSET: ON-GOING

## 2024-08-27 ASSESSMENT — PAIN DESCRIPTION - PAIN TYPE
TYPE: SURGICAL PAIN

## 2024-08-27 NOTE — PLAN OF CARE
Problem: Discharge Planning  Goal: Discharge to home or other facility with appropriate resources  Outcome: Progressing  Flowsheets (Taken 8/27/2024 0809)  Discharge to home or other facility with appropriate resources: Identify barriers to discharge with patient and caregiver     Problem: Safety - Adult  Goal: Free from fall injury  Outcome: Progressing     Problem: Pain  Goal: Verbalizes/displays adequate comfort level or baseline comfort level  Outcome: Progressing     Problem: ABCDS Injury Assessment  Goal: Absence of physical injury  Outcome: Progressing  Flowsheets (Taken 8/27/2024 0809)  Absence of Physical Injury: Implement safety measures based on patient assessment     Problem: Skin/Tissue Integrity  Goal: Absence of new skin breakdown  Description: 1.  Monitor for areas of redness and/or skin breakdown  2.  Assess vascular access sites hourly  3.  Every 4-6 hours minimum:  Change oxygen saturation probe site  4.  Every 4-6 hours:  If on nasal continuous positive airway pressure, respiratory therapy assess nares and determine need for appliance change or resting period.  Outcome: Progressing     Problem: Nutrition Deficit:  Goal: Optimize nutritional status  Outcome: Progressing

## 2024-08-27 NOTE — PROGRESS NOTES
Physical Therapy  Facility/Department: [address] [facility]  Rehabilitation Physical Therapy Treatment Note    NAME: Mark Josue  : 1945 (79 y.o.)  MRN: [id number]  CODE STATUS: Full Code    Date of Service: 24       Restrictions:  Restrictions/Precautions: Fall Risk, Weight Bearing  Lower Extremity Weight Bearing Restrictions  Left Lower Extremity Weight Bearing: Weight Bearing As Tolerated     SUBJECTIVE  Subjective  Subjective: Pt slept better, reports increase swelling LLE - has been icing/elevating. Mild soreness from yesterday.  Pain: 5/10 L knee pain    OBJECTIVE  Cognition  Overall Cognitive Status: WFL  Orientation  Overall Orientation Status: Within Functional Limits    Functional Mobility  Bed Mobility  Overall Assistance Level: Stand By Assist  Additional Factors: Head of bed flat;Without handrails;Increased time to complete  Sit to Supine  Assistance Level: Stand by assist  Supine to Sit  Assistance Level: Stand by assist  Scooting  Assistance Level: Stand by assist  Balance  Sitting Balance: Stand by assistance  Standing Balance: Contact guard assistance  Standing Balance  Time: 5-10 mins  Activity: w/c > Stedy for use of commode (BM) - able to manage pants and pericare with increased time. Large BM noted and RN aware. Commode <> recliner chair via Stedy in the room  Comments: CGA for stand into Stedy, SBA w/n Stedy  Transfers  Surface: Wheelchair;To chair with arms;From chair with arms;To mat;From mat  Additional Factors: Set-up;Verbal cues;Increased time to complete  Device: Walker  Sit to Stand  Assistance Level: Contact guard assist  Stand to Sit  Assistance Level: Contact guard assist  Bed To/From Chair  Technique: Stand step  Assistance Level: Contact guard assist  Skilled Clinical Factors: chair <> mat table with RW  Stand Pivot  Assistance Level: Contact guard assist  Skilled Clinical Factors: recliner <> w/c with RW      Environmental Mobility     Will defer to PM session      precautions in place [] Bed alarm in place  [] Call light within reach [] Chair alarm in place [] Positioning belt [x] Gait belt [] Patient at risk for falls [] Left in bed [x] Left in chair [] Telesitter in use [] Sitter present [] Nurse notified [x]  Care transferred to OT [] Returned to room w/ transport      Therapy Time   Individual Concurrent Group Co-treatment   Time In 0815     1230   Time Out 0915     1300   Minutes 60     30     Timed Code Treatment Minutes: 60 Minutes       Han García, PT, 08/27/24 at 1:15 PM

## 2024-08-27 NOTE — PROGRESS NOTES
Pt resting in bed, declines getting up to chair, declines dinner at this time. Dinner saved in room for pt. Pt resting quietly, denies needs at this time. Call light in reach, bed alarm on for safety.

## 2024-08-27 NOTE — PROGRESS NOTES
Pt Aox4, [place], VSS, RA. Pain managed effectively with polar ice pack and PRN medications. Incision to left knee TERRENCE, well approximated. Pt up with stedy x1 with gait belt, tolerating well. Pt resting in bed at this time, family at bedside. Call light in reach, bed alarm on for safety.

## 2024-08-27 NOTE — PLAN OF CARE
Problem: Discharge Planning  Goal: Discharge to home or other facility with appropriate resources  8/26/2024 2107 by Ashlie Santoyo RN  Outcome: Progressing  8/26/2024 0850 by Ashlie Roman RN  Outcome: Progressing  Flowsheets  Taken 8/26/2024 0850 by Ashlie Roman RN  Discharge to home or other facility with appropriate resources: Identify barriers to discharge with patient and caregiver  Taken 8/25/2024 2133 by Whit Guzman RN  Discharge to home or other facility with appropriate resources: Identify barriers to discharge with patient and caregiver     Problem: Safety - Adult  Goal: Free from fall injury  8/26/2024 2107 by Ashlie Santoyo RN  Outcome: Progressing  8/26/2024 0850 by Ashlie Roman RN  Outcome: Progressing  Flowsheets (Taken 8/26/2024 0850)  Free From Fall Injury:   Instruct family/caregiver on patient safety   Based on caregiver fall risk screen, instruct family/caregiver to ask for assistance with transferring infant if caregiver noted to have fall risk factors     Problem: Pain  Goal: Verbalizes/displays adequate comfort level or baseline comfort level  8/26/2024 2107 by Ashlie Santoyo RN  Outcome: Progressing  8/26/2024 0850 by Ashlie Roman RN  Outcome: Progressing  Flowsheets (Taken 8/26/2024 0850)  Verbalizes/displays adequate comfort level or baseline comfort level: Encourage patient to monitor pain and request assistance     Problem: ABCDS Injury Assessment  Goal: Absence of physical injury  8/26/2024 2107 by Ashlie Santoyo RN  Outcome: Progressing  8/26/2024 0850 by Ashlie Roman RN  Outcome: Progressing  Flowsheets (Taken 8/26/2024 0850)  Absence of Physical Injury: Implement safety measures based on patient assessment     Problem: Skin/Tissue Integrity  Goal: Absence of new skin breakdown  Description: 1.  Monitor for areas of redness and/or skin breakdown  2.  Assess vascular access sites hourly  3.  Every 4-6

## 2024-08-27 NOTE — PROGRESS NOTES
AM Jardiance not yet given, out of stock. Per pharmacy, will send medication once stock comes in today

## 2024-08-27 NOTE — DISCHARGE SUMMARY
Physician Discharge Summary     Patient ID:  Mark Josue  [id number]  79 y.o.  1945    Admit date: 8/20/2024    Discharge date and time: 8/23/2024  1:06 PM     Admitting Physician: Rory Deng MD     Discharge Physician: SANGEETHA Deng    Admission Diagnoses: Osteoarthritis of left knee [M17.12]  Arthritis of left knee [M17.12]    Discharge Diagnoses: left knee OA    Admission Condition: good    Discharged Condition: good    Indication for Admission: Failed conservative treatment as outpatient for joint pain including PT and pain meds. This patient was then electively scheduled for total joint replacement surgery    Surgical procedure: left TKA    Consults: PT OT SS    This patient had no postoperative complications. They has PT and OT for ADL's . IV and PO pain med for pain control and was eventually DC in stable condition    Treatments: analgesia,  therapies: PT OT,  and surgery      Disposition: home    Patient Instructions:   [unfilled]  Activity: activity as tolerated  Diet: regular diet  Wound Care: keep wound clean and dry    Follow-up with SANGEETHA Deng in 2 weeks.    Signed:  CELINA Morris CNP  8/27/2024  4:11 PM

## 2024-08-27 NOTE — PROGRESS NOTES
Occupational Therapy  Facility/Department: [address] [facility]  Rehabilitation Occupational Therapy Daily Treatment Note    Date: 24  Patient Name: Mark Josue       Room: D8R-4819/3261-01  MRN: [id number]  Account: [id number]   : 1945  (79 y.o.) Gender: male                    Past Medical History:  has a past medical history of Anxiety, CAD (coronary artery disease), Depression, GERD (gastroesophageal reflux disease), Hyperlipidemia, Hypertension, TAM on CPAP, Thyroid disease, and Wears glasses.  Past Surgical History:   has a past surgical history that includes Knee arthroscopy (Right); Shoulder arthroscopy (Left, 15 years ago); Cardiac surgery (); Cholecystectomy (18 years ago); Tibia fracture surgery (Left, 2015); Cataract extraction (Bilateral); Colonoscopy (); and Total knee arthroplasty (Left, 2024).    Restrictions  Restrictions/Precautions: Fall Risk, Weight Bearing  Left Lower Extremity Weight Bearing: Weight Bearing As Tolerated    Subjective  Subjective: Pt met in dept, reporting pain at rest in L knee 8/10. Pt agreeable to OT treat.  Restrictions/Precautions: Fall Risk;Weight Bearing             Objective     Cognition  Overall Cognitive Status: WFL  Orientation  Overall Orientation Status: Within Functional Limits         ADL             Sit to Stand  Assistance Level: Contact guard assist  Skilled Clinical Factors: Pt stood WC > table CGA w/ proper hand placement w/o cues.  Bed To/From Chair  Technique: Stand pivot  Assistance Level: Contact guard assist  Skilled Clinical Factors: Transferred WC > bed CGA w/ RW   OT Exercises  Exercise Treatment: Pt in stance at table to improve standing tolerance and strength for transfers/ADL tasks. Pt stood WC > table CGA, balance in stance SBA. Tolerated 6 minutes shgifting weight to RLE, was cued to evenly distribute weight on BLE.     PM Session:  Pt met in room supine in bed. Pt transferred bed > chair w/ RW CGA. Pt taken to  dept for co-tx to address functional mobility.     Pt stood WC > RW Min A/CGA and amb 20' CGA x2 w/ slow gait and WC follow. Pt required seated rest break d/t BLE fatigue. Pt completed additional trial amb 30' w/ RW, CGA. No LOB and sat to WC Min A d/t fatigue.     Taken to the gym via w/c. STS at CGA for dynamic balance activity: hitting balloon toss w/ BUE holding a broomstick. Up to Min A for trunk support with posterior lean. Decreased balance noted with placing hands quickly back on RW and almost LOB a few times. Limited by BLE fatigue.    Pt reporting 9/10 pain in L knee after functional mob and balance task.  WC was positioned in bathroom for toilet transfer. He stood WC > RW SBA and transferred w/ RW to toilet with raised toilet seat and TSF CGA. Pt stood and transferred > WC CGA. Good hand placement throughout.     Pt was ed on and completed UE HEP for carryover to ADL tasks/transfers. Pt required demo, had good technique and tolerance.   X10 straight up  X10 straight forward  X10 down  X10 to the side  X10 both sides out  X10 elbow bend  X10 elbow back    Pt progressed with functional mobility/transfers this session. Cont to be limited by pain in L knee, cont w/ treat per POC.     Safety Device - Type of devices: Pt left in transport line to be taken back to room  []  All fall risk precautions in place [] Bed alarm in place  [] Call light within reach [] Chair alarm in place [] Positioning belt [] Gait belt [] Patient at risk for falls [] Left in bed [] Left in chair [] Telesitter in use [] Sitter present [] Nurse notified []  None      Assessment  Assessment  Assessment: Pt tolerated session fair, demo'd improvement with transfers and standing tolerance; stood from WC > table CGA and completed activity in stance for 6 min SBA.  Pt reporting pain in L knee stating he hasn't had ice on it and requesting ice. Pt was taken abck to room, transferred WC > bed CGA w/ RW. He was set up with ice on L knee and all

## 2024-08-27 NOTE — PROGRESS NOTES
Department of Physical Medicine & Rehabilitation  Progress Note    Patient Identification:  Mark Josue  [id number]  : 1945  Admit date: 2024    Chief Complaint: S/P TKR (total knee replacement), left    Subjective:   No acute events overnight.   Patient seen this afternoon sitting up in room. He reports tramadol seems to be helping with pain and has not caused any confusion or hallucinations. Denies constipation.   Labs reviewed.     ROS: No f/c, n/v, cp     Objective:  Patient Vitals for the past 24 hrs:   BP Temp Temp src Pulse Resp SpO2 Weight   24 1634 (!) 148/78 -- -- 78 -- -- --   24 1219 -- -- -- -- 20 -- --   24 1149 -- -- -- -- 18 -- --   24 0926 115/79 -- -- 84 -- -- --   24 0809 102/70 97.4 °F (36.3 °C) Oral 85 18 95 % --   24 0519 -- -- -- -- -- -- 120.3 kg (265 lb 3.4 oz)   24 1945 (!) 154/88 97.5 °F (36.4 °C) Oral 84 16 96 % --     Const: Alert. No distress, pleasant.   HEENT: Normocephalic, atraumatic. Normal sclera/conjunctiva. MMM.   CV: Regular rate and rhythm.   Resp: No respiratory distress. Lungs CTAB.   Abd: Soft, nontender, nondistended, NABS+   Ext/MSK: Left knee post op swelling and decreased ROM  Neuro: Alert, oriented, appropriately interactive.   Psych: Cooperative, appropriate mood and affect    Laboratory data: Available via EMR.   Last 24 hour lab  No results found for this or any previous visit (from the past 24 hour(s)).      Therapy progress:  Physical therapy:  Bed Mobility:  Overall Assistance Level: Stand By Assist  Additional Factors: Head of bed flat, Without handrails, Increased time to complete  Sit>supine:  Assistance Level: Stand by assist  Supine>sit:  Assistance Level: Stand by assist  Transfers:  Surface: Wheelchair, To chair with arms, From chair with arms, To mat, From mat  Additional Factors: Set-up, Verbal cues, Increased time to complete  Device: Walker  Sit>stand:  Assistance Level: Contact guard

## 2024-08-28 PROCEDURE — 97530 THERAPEUTIC ACTIVITIES: CPT

## 2024-08-28 PROCEDURE — 97116 GAIT TRAINING THERAPY: CPT

## 2024-08-28 PROCEDURE — 97110 THERAPEUTIC EXERCISES: CPT

## 2024-08-28 PROCEDURE — 94660 CPAP INITIATION&MGMT: CPT

## 2024-08-28 PROCEDURE — 6370000000 HC RX 637 (ALT 250 FOR IP): Performed by: PHYSICAL MEDICINE & REHABILITATION

## 2024-08-28 PROCEDURE — 94760 N-INVAS EAR/PLS OXIMETRY 1: CPT

## 2024-08-28 PROCEDURE — 97535 SELF CARE MNGMENT TRAINING: CPT

## 2024-08-28 PROCEDURE — 2580000003 HC RX 258: Performed by: PHYSICAL MEDICINE & REHABILITATION

## 2024-08-28 PROCEDURE — 1280000000 HC REHAB R&B

## 2024-08-28 RX ADMIN — SACUBITRIL AND VALSARTAN 1 TABLET: 49; 51 TABLET, FILM COATED ORAL at 21:02

## 2024-08-28 RX ADMIN — ASPIRIN 81 MG: 81 TABLET, COATED ORAL at 20:07

## 2024-08-28 RX ADMIN — LORAZEPAM 0.5 MG: 0.5 TABLET ORAL at 20:07

## 2024-08-28 RX ADMIN — BUSPIRONE HYDROCHLORIDE 15 MG: 5 TABLET ORAL at 07:42

## 2024-08-28 RX ADMIN — DULOXETINE HYDROCHLORIDE 60 MG: 60 CAPSULE, DELAYED RELEASE ORAL at 07:43

## 2024-08-28 RX ADMIN — ASPIRIN 81 MG: 81 TABLET, COATED ORAL at 07:42

## 2024-08-28 RX ADMIN — SODIUM CHLORIDE, PRESERVATIVE FREE 10 ML: 5 INJECTION INTRAVENOUS at 07:44

## 2024-08-28 RX ADMIN — FLUTICASONE PROPIONATE 1 SPRAY: 50 SPRAY, METERED NASAL at 07:45

## 2024-08-28 RX ADMIN — LEVOTHYROXINE SODIUM 125 MCG: 0.12 TABLET ORAL at 05:11

## 2024-08-28 RX ADMIN — PANTOPRAZOLE SODIUM 40 MG: 40 TABLET, DELAYED RELEASE ORAL at 05:11

## 2024-08-28 RX ADMIN — BUPROPION HYDROCHLORIDE 150 MG: 150 TABLET, FILM COATED, EXTENDED RELEASE ORAL at 07:42

## 2024-08-28 RX ADMIN — CARVEDILOL 12.5 MG: 12.5 TABLET, FILM COATED ORAL at 16:11

## 2024-08-28 RX ADMIN — SACUBITRIL AND VALSARTAN 1 TABLET: 49; 51 TABLET, FILM COATED ORAL at 07:43

## 2024-08-28 RX ADMIN — ATORVASTATIN CALCIUM 10 MG: 10 TABLET, FILM COATED ORAL at 20:07

## 2024-08-28 RX ADMIN — BUPROPION HYDROCHLORIDE 150 MG: 150 TABLET, FILM COATED, EXTENDED RELEASE ORAL at 20:07

## 2024-08-28 RX ADMIN — ACETAMINOPHEN 325MG 650 MG: 325 TABLET ORAL at 07:42

## 2024-08-28 RX ADMIN — BUSPIRONE HYDROCHLORIDE 15 MG: 5 TABLET ORAL at 20:07

## 2024-08-28 RX ADMIN — LORAZEPAM 0.5 MG: 0.5 TABLET ORAL at 07:42

## 2024-08-28 RX ADMIN — CARVEDILOL 12.5 MG: 12.5 TABLET, FILM COATED ORAL at 07:42

## 2024-08-28 RX ADMIN — EMPAGLIFLOZIN 10 MG: 10 TABLET, FILM COATED ORAL at 07:43

## 2024-08-28 RX ADMIN — SODIUM CHLORIDE, PRESERVATIVE FREE 8 ML: 5 INJECTION INTRAVENOUS at 20:07

## 2024-08-28 RX ADMIN — ACETAMINOPHEN 325MG 650 MG: 325 TABLET ORAL at 21:03

## 2024-08-28 ASSESSMENT — PAIN - FUNCTIONAL ASSESSMENT: PAIN_FUNCTIONAL_ASSESSMENT: PREVENTS OR INTERFERES SOME ACTIVE ACTIVITIES AND ADLS

## 2024-08-28 ASSESSMENT — PAIN DESCRIPTION - LOCATION: LOCATION: KNEE

## 2024-08-28 ASSESSMENT — PAIN SCALES - GENERAL: PAINLEVEL_OUTOF10: 4

## 2024-08-28 ASSESSMENT — PAIN DESCRIPTION - PAIN TYPE: TYPE: SURGICAL PAIN

## 2024-08-28 ASSESSMENT — PAIN DESCRIPTION - FREQUENCY: FREQUENCY: CONTINUOUS

## 2024-08-28 ASSESSMENT — PAIN DESCRIPTION - DESCRIPTORS: DESCRIPTORS: DULL

## 2024-08-28 ASSESSMENT — PAIN DESCRIPTION - ORIENTATION: ORIENTATION: LEFT

## 2024-08-28 ASSESSMENT — PAIN DESCRIPTION - ONSET: ONSET: ON-GOING

## 2024-08-28 NOTE — PROGRESS NOTES
Pt refusing lunch group, refusing to get up in chair for lunch. Sitting up in bed, wife at bedside. Denies needs. Call light in reach, bed alarm on for safety.

## 2024-08-28 NOTE — PROGRESS NOTES
Patient admitted to rehab with left total knee.  A/Ox4. Transfers with CGA, walker and gaitbelt. Mobility restrictions:None . On reg diet, tolerating well. Medications taken whole with water. Drake hose for DVT prophylaxis.  Skin: surgical incision to left knee, TERRENCE, no s/s infection. Oxygen: RA. LDA: PIV left forearm. Has been continent of bowel and continent of bladder. LBM 8/28/24. Chair/bed alarms in use and call light in reach. Will monitor for safety.

## 2024-08-28 NOTE — PROGRESS NOTES
Physical Therapy  Facility/Department: [address] [facility]  Rehabilitation Physical Therapy Treatment Note    NAME: Mark Josue  : 1945 (79 y.o.)  MRN: [id number]  CODE STATUS: Full Code    Date of Service: 24       Restrictions:  Restrictions/Precautions: Fall Risk, Weight Bearing  Lower Extremity Weight Bearing Restrictions  Left Lower Extremity Weight Bearing: Weight Bearing As Tolerated     SUBJECTIVE  Subjective  Subjective: Pt notes increased bruising around L knee. No additional complaints.  Pain: 2-3/10 L knee pain    OBJECTIVE  Cognition  Overall Cognitive Status: WFL  Orientation  Overall Orientation Status: Within Functional Limits    Functional Mobility  Bed Mobility  Overall Assistance Level: Supervision  Additional Factors: Head of bed flat;Without handrails;Increased time to complete  Roll Left  Assistance Level: Supervision  Roll Right  Assistance Level: Supervision  Sit to Supine  Assistance Level: Supervision  Supine to Sit  Assistance Level: Supervision  Scooting  Assistance Level: Supervision  Balance  Sitting Balance: Supervision  Standing Balance: Stand by assistance (RW)  Transfers  Surface: Wheelchair;To chair with arms;From chair with arms;To mat;From mat  Additional Factors: Verbal cues;Increased time to complete  Sit to Stand  Assistance Level: Contact guard assist  Stand to Sit  Assistance Level: Contact guard assist  Car Transfer  Assistance Level: Stand by assist;Contact guard assist  Skilled Clinical Factors: cues for sequence and reminders for hand placement - no LOB. Increased time for stand from lower car surface (patient reports they have an SUV)      Environmental Mobility  Ambulation  Surface: Level surface  Device: Rolling walker  Distance: 20 + 40 ft with 2 turns  Activity: Within Unit  Additional Factors: Increased time to complete  Assistance Level: Contact guard assist;Minimal assistance  Gait Deviations: Slow karla;Decreased weight shift left;Decreased step  length right  Skilled Clinical Factors: slow step to gait pattern with RW, trunk flexed forward with pushing down on walker, increases karla with distance - limited by LLE pain and occasional posterior lean with up to Min A to recorrect posture  Stairs  Stair Height: 6''  Device: Bilateral handrails  Number of Stairs: 4  Additional Factors: Verbal cues;Non-reciprocal going up;Non-reciprocal going down;Increased time to complete  Assistance Level: Contact guard assist;Minimal assistance  Skilled Clinical Factors: up to Min A for trunk support (descent > ascend) but slow and controlled, cues for proper sequence  Curb  Curb Height: 6''  Device: Rolling walker  Number of Curbs: 1  Additional Factors: Verbal cues;Increased time to complete;Hand placement cues  Assistance Level: Contact guard assist;Minimal assistance  Skilled Clinical Factors: Min A to manage walker and steadiness - need CGA for turning on the platform w/ RW         PT Exercises  Exercise Treatment: Seated in chair: L knee flexion with towel x 20. B LAQ 2 x 10, hip ADD ball squeeze, HS curls and hip ABD with RTB    ASSESSMENT/PROGRESS TOWARDS GOALS       Assessment  Assessment: Pt is a 79 y.o. M. admitted 8/23 s/p L TKA at [facility]. This date patient shows improvement in functional mobility, able to amb household distances with RW, navigate 4 stairs w/ BHR and curb step with RW - cues for proper sequencing. Requires up to CGA-Min A with decreased dynamic balance and walker management at times. Limited by L knee ROM, weakness and pain. Patient benefits from therapy at high frequency on ARU to maximize his strength, ROM, balance, endurance, independence with mobility tasks, and to reduce knee pain and swelling. Anticipate safe return home with 24 hr sup/assist from spouse and HHPT.  Activity Tolerance: Patient limited by pain;Patient tolerated treatment well  Discharge Recommendations: Therapy recommended at discharge;Continue to assess pending progress;24

## 2024-08-28 NOTE — PROGRESS NOTES
Department of Physical Medicine & Rehabilitation  Progress Note    Patient Identification:  Mark Josue  [id number]  : 1945  Admit date: 2024    Chief Complaint: S/P TKR (total knee replacement), left    Subjective:   No acute events overnight.   Patient seen this afternoon resting in bed. He and wife feel he is making good functional progress. No further episodes of confusion or hallucinations reported. They have noted bruising behind his knee. Based on exam appears to be old blood products from posterior thigh ecchymosis.   Labs reviewed.     ROS: No f/c, n/v, cp     Objective:  Patient Vitals for the past 24 hrs:   BP Temp Temp src Pulse Resp SpO2 Weight   24 1611 136/78 -- -- 78 -- -- --   24 0742 134/89 97.5 °F (36.4 °C) Axillary 90 20 97 % --   24 0515 -- -- -- -- -- -- 119.3 kg (263 lb)   24 0004 -- -- -- -- -- 90 % --   24 1930 112/65 97.9 °F (36.6 °C) Oral 73 18 95 % --     Const: Alert. No distress, pleasant.   HEENT: Normocephalic, atraumatic. Normal sclera/conjunctiva. MMM.   CV: Regular rate and rhythm.   Resp: No respiratory distress. Lungs CTAB.   Abd: Soft, nontender, nondistended, NABS+   Ext/MSK: Left knee post op swelling and decreased ROM  Neuro: Alert, oriented, appropriately interactive.   Psych: Cooperative, appropriate mood and affect    Laboratory data: Available via EMR.   Last 24 hour lab  No results found for this or any previous visit (from the past 24 hour(s)).      Therapy progress:  Physical therapy:  Bed Mobility:  Overall Assistance Level: Supervision  Additional Factors: Head of bed flat, Without handrails, Increased time to complete  Sit>supine:  Assistance Level: Supervision  Supine>sit:  Assistance Level: Supervision  Transfers:  Surface: Wheelchair, To chair with arms, From chair with arms, To mat, From mat  Additional Factors: Verbal cues, Increased time to complete  Device: Walker  Sit>stand:  Assistance Level: Contact guard  Level: Set-up  Skilled Clinical Factors: Pt seated in WC at sink brushed teeth.  UE Bathing  Assistance Level: Stand by assist  Skilled Clinical Factors: Pt washed/dried UB by self including abd folds,  was ed on using LH sponge for back. He managed hose and water temp with Min A.  LE Bathing  Equipment Provided: Long-handled sponge  Assistance Level: Minimal assistance  Skilled Clinical Factors: Pt washed LB using LH sponge for feet and lower part of legs. He stood to grab bars CGA and washed buttocks and selvin area in stance CGA w/ 1 UE on grab bar. Stood additional time for OT to dry buttocks  UE Dressing  Assistance Level: Set-up  Skilled Clinical Factors: Pt doffed/donned clean shirt set-up  LE Dressing  Equipment Provided: Reachers  Assistance Level: Minimal assistance  Skilled Clinical Factors: Pt in stance in shower holding onto grab bars pulled pants and brief down from hips CGA for balabce. Seated he unthreaded w/ reacher. Pt threaded underwear and cargo shorts by self, he stood to grab bars and required assist to pull over hips in stance d/t fatigue.  Putting On/Taking Off Footwear  Equipment Provided: Sock aid, Reachers  Assistance Level: Moderate assistance  Skilled Clinical Factors: Pt doffed R footie and Lenore hose by leaning forward. he doffed L sock with reacher and required assist to dof L lenore hose. He was ed on sock chung to don socks (OT donned R on sock aide and pt was able to palce by foot and pull on), he donned L on sock aide and pulled on foot. Overall Mod A d/t Lenore hose.  Toileting  Assistance Level: Moderate assistance  Skilled Clinical Factors: Pt reports he just had BM.    Speech therapy:    ADULT DIET; Regular        Body mass index is 32.01 kg/m².    Rehabilitation Diagnosis:   Orthopedic, 8.61, Unilateral Knee Replacement        Assessment and Plan:     Impairments: Decreased left knee ROM, decreased balance      Arthritis left knee   -s/p Left TKA (8/20 with Dr. Deng)  -wound care per

## 2024-08-28 NOTE — PROGRESS NOTES
Occupational Therapy  Facility/Department: [address] [facility]  Rehabilitation Occupational Therapy Daily Treatment Note    Date: 24  Patient Name: Mark Josue       Room: M7Z-0439/3261-01  MRN: [id number]  Account: [id number]   : 1945  (79 y.o.) Gender: male                    Past Medical History:  has a past medical history of Anxiety, CAD (coronary artery disease), Depression, GERD (gastroesophageal reflux disease), Hyperlipidemia, Hypertension, TAM on CPAP, Thyroid disease, and Wears glasses.  Past Surgical History:   has a past surgical history that includes Knee arthroscopy (Right); Shoulder arthroscopy (Left, 15 years ago); Cardiac surgery (); Cholecystectomy (18 years ago); Tibia fracture surgery (Left, 2015); Cataract extraction (Bilateral); Colonoscopy (); and Total knee arthroplasty (Left, 2024).    Restrictions  Restrictions/Precautions: Fall Risk, Weight Bearing  Left Lower Extremity Weight Bearing: Weight Bearing As Tolerated    Subjective  Subjective: Pt met in room, reporting pain throughout the night 1/10. Pain now sitting in WC is 3/10. Pt agreeable to OT ADL Session  Restrictions/Precautions: Fall Risk;Weight Bearing             Objective     Cognition  Overall Cognitive Status: WFL  Orientation  Overall Orientation Status: Within Functional Limits         ADL  Grooming/Oral Hygiene  Assistance Level: Set-up  Skilled Clinical Factors: Pt seated in WC at sink brushed teeth.  Upper Extremity Bathing  Assistance Level: Stand by assist  Skilled Clinical Factors: Pt washed/dried UB by self including abd folds,  was ed on using LH sponge for back. He managed hose and water temp with Min A.  Lower Extremity Bathing  Equipment Provided: Long-handled sponge  Assistance Level: Minimal assistance  Skilled Clinical Factors: Pt washed LB using LH sponge for feet and lower part of legs. He stood to grab bars CGA and washed buttocks and selvin area in stance CGA w/ 1 UE on grab    Minutes 30          Coni Booker, OT

## 2024-08-28 NOTE — PLAN OF CARE
Problem: Discharge Planning  Goal: Discharge to home or other facility with appropriate resources  8/27/2024 2352 by Amanda Aldana RN  Outcome: Progressing  Flowsheets (Taken 8/27/2024 2000)  Discharge to home or other facility with appropriate resources: Identify barriers to discharge with patient and caregiver  8/27/2024 1342 by Radha Dorantes RN  Outcome: Progressing  Flowsheets (Taken 8/27/2024 0809)  Discharge to home or other facility with appropriate resources: Identify barriers to discharge with patient and caregiver     Problem: Safety - Adult  Goal: Free from fall injury  8/27/2024 2352 by Amanda Aldana RN  Outcome: Progressing  8/27/2024 1342 by Radha Dorantes RN  Outcome: Progressing     Problem: Pain  Goal: Verbalizes/displays adequate comfort level or baseline comfort level  8/27/2024 2352 by Amanda Aldana RN  Outcome: Progressing  Flowsheets (Taken 8/27/2024 1930)  Verbalizes/displays adequate comfort level or baseline comfort level: Encourage patient to monitor pain and request assistance  8/27/2024 1342 by Radha Dorantes RN  Outcome: Progressing     Problem: ABCDS Injury Assessment  Goal: Absence of physical injury  8/27/2024 2352 by Amanda Aldana RN  Outcome: Progressing  8/27/2024 1342 by Radha Dorantes RN  Outcome: Progressing  Flowsheets (Taken 8/27/2024 0809)  Absence of Physical Injury: Implement safety measures based on patient assessment     Problem: Skin/Tissue Integrity  Goal: Absence of new skin breakdown  Description: 1.  Monitor for areas of redness and/or skin breakdown  2.  Assess vascular access sites hourly  3.  Every 4-6 hours minimum:  Change oxygen saturation probe site  4.  Every 4-6 hours:  If on nasal continuous positive airway pressure, respiratory therapy assess nares and determine need for appliance change or resting period.  8/27/2024 2352 by Amanda Aldana RN  Outcome: Progressing  8/27/2024 1342 by Radha Dorantes RN  Outcome: Progressing     Problem:  Nutrition Deficit:  Goal: Optimize nutritional status  8/27/2024 2352 by Amanda Aldana, RN  Outcome: Progressing  8/27/2024 1342 by Radha Dorantes, RN  Outcome: Progressing

## 2024-08-28 NOTE — PLAN OF CARE
Problem: Discharge Planning  Goal: Discharge to home or other facility with appropriate resources  8/28/2024 1108 by Radha Dorantes RN  Outcome: Progressing  Flowsheets (Taken 8/28/2024 0748)  Discharge to home or other facility with appropriate resources: Identify barriers to discharge with patient and caregiver     Problem: Safety - Adult  Goal: Free from fall injury  8/28/2024 1108 by Radha Dorantes RN  Outcome: Progressing     Problem: Pain  Goal: Verbalizes/displays adequate comfort level or baseline comfort level  8/28/2024 1108 by Radha Dorantes RN  Outcome: Progressing     Problem: ABCDS Injury Assessment  Goal: Absence of physical injury  8/28/2024 1108 by Radha Dorantes RN  Outcome: Progressing  Flowsheets (Taken 8/28/2024 0918)  Absence of Physical Injury: Implement safety measures based on patient assessment     Problem: Skin/Tissue Integrity  Goal: Absence of new skin breakdown  Description: 1.  Monitor for areas of redness and/or skin breakdown  2.  Assess vascular access sites hourly  3.  Every 4-6 hours minimum:  Change oxygen saturation probe site  4.  Every 4-6 hours:  If on nasal continuous positive airway pressure, respiratory therapy assess nares and determine need for appliance change or resting period.  8/28/2024 1108 by Radha Dorantes RN  Outcome: Progressing     Problem: Nutrition Deficit:  Goal: Optimize nutritional status  8/28/2024 1108 by Radha Dorantes RN  Outcome: Progressing

## 2024-08-29 LAB
ANION GAP SERPL CALCULATED.3IONS-SCNC: 11 MMOL/L (ref 3–16)
BASOPHILS # BLD: 0.1 K/UL (ref 0–0.2)
BASOPHILS NFR BLD: 0.7 %
BUN SERPL-MCNC: 19 MG/DL (ref 7–20)
CALCIUM SERPL-MCNC: 9.1 MG/DL (ref 8.3–10.6)
CHLORIDE SERPL-SCNC: 100 MMOL/L (ref 99–110)
CO2 SERPL-SCNC: 23 MMOL/L (ref 21–32)
CREAT SERPL-MCNC: 0.9 MG/DL (ref 0.8–1.3)
DEPRECATED RDW RBC AUTO: 14.9 % (ref 12.4–15.4)
EOSINOPHIL # BLD: 0.1 K/UL (ref 0–0.6)
EOSINOPHIL NFR BLD: 1.8 %
GFR SERPLBLD CREATININE-BSD FMLA CKD-EPI: 88 ML/MIN/{1.73_M2}
GLUCOSE SERPL-MCNC: 88 MG/DL (ref 70–99)
HCT VFR BLD AUTO: 36.9 % (ref 40.5–52.5)
HGB BLD-MCNC: 12.8 G/DL (ref 13.5–17.5)
LYMPHOCYTES # BLD: 1.4 K/UL (ref 1–5.1)
LYMPHOCYTES NFR BLD: 17.2 %
MCH RBC QN AUTO: 33.5 PG (ref 26–34)
MCHC RBC AUTO-ENTMCNC: 34.7 G/DL (ref 31–36)
MCV RBC AUTO: 96.7 FL (ref 80–100)
MONOCYTES # BLD: 1 K/UL (ref 0–1.3)
MONOCYTES NFR BLD: 12.4 %
NEUTROPHILS # BLD: 5.4 K/UL (ref 1.7–7.7)
NEUTROPHILS NFR BLD: 67.9 %
PLATELET # BLD AUTO: 264 K/UL (ref 135–450)
PMV BLD AUTO: 8 FL (ref 5–10.5)
POTASSIUM SERPL-SCNC: 3.8 MMOL/L (ref 3.5–5.1)
POTASSIUM SERPL-SCNC: ABNORMAL MMOL/L (ref 3.5–5.1)
RBC # BLD AUTO: 3.82 M/UL (ref 4.2–5.9)
REASON FOR REJECTION: NORMAL
REJECTED TEST: NORMAL
SODIUM SERPL-SCNC: 134 MMOL/L (ref 136–145)
WBC # BLD AUTO: 8 K/UL (ref 4–11)

## 2024-08-29 PROCEDURE — 6370000000 HC RX 637 (ALT 250 FOR IP): Performed by: PHYSICAL MEDICINE & REHABILITATION

## 2024-08-29 PROCEDURE — 97530 THERAPEUTIC ACTIVITIES: CPT

## 2024-08-29 PROCEDURE — 97110 THERAPEUTIC EXERCISES: CPT

## 2024-08-29 PROCEDURE — 2580000003 HC RX 258: Performed by: PHYSICAL MEDICINE & REHABILITATION

## 2024-08-29 PROCEDURE — 84132 ASSAY OF SERUM POTASSIUM: CPT

## 2024-08-29 PROCEDURE — 80048 BASIC METABOLIC PNL TOTAL CA: CPT

## 2024-08-29 PROCEDURE — 94760 N-INVAS EAR/PLS OXIMETRY 1: CPT

## 2024-08-29 PROCEDURE — 97535 SELF CARE MNGMENT TRAINING: CPT

## 2024-08-29 PROCEDURE — 36415 COLL VENOUS BLD VENIPUNCTURE: CPT

## 2024-08-29 PROCEDURE — 94660 CPAP INITIATION&MGMT: CPT

## 2024-08-29 PROCEDURE — 97116 GAIT TRAINING THERAPY: CPT

## 2024-08-29 PROCEDURE — 85025 COMPLETE CBC W/AUTO DIFF WBC: CPT

## 2024-08-29 PROCEDURE — 1280000000 HC REHAB R&B

## 2024-08-29 RX ADMIN — BUPROPION HYDROCHLORIDE 150 MG: 150 TABLET, FILM COATED, EXTENDED RELEASE ORAL at 19:48

## 2024-08-29 RX ADMIN — SACUBITRIL AND VALSARTAN 1 TABLET: 49; 51 TABLET, FILM COATED ORAL at 20:06

## 2024-08-29 RX ADMIN — LORAZEPAM 0.5 MG: 0.5 TABLET ORAL at 07:46

## 2024-08-29 RX ADMIN — FLUTICASONE PROPIONATE 1 SPRAY: 50 SPRAY, METERED NASAL at 07:48

## 2024-08-29 RX ADMIN — SENNOSIDES AND DOCUSATE SODIUM 1 TABLET: 50; 8.6 TABLET ORAL at 07:46

## 2024-08-29 RX ADMIN — PANTOPRAZOLE SODIUM 40 MG: 40 TABLET, DELAYED RELEASE ORAL at 05:39

## 2024-08-29 RX ADMIN — CARVEDILOL 12.5 MG: 12.5 TABLET, FILM COATED ORAL at 16:40

## 2024-08-29 RX ADMIN — LORAZEPAM 0.5 MG: 0.5 TABLET ORAL at 19:49

## 2024-08-29 RX ADMIN — DULOXETINE HYDROCHLORIDE 60 MG: 60 CAPSULE, DELAYED RELEASE ORAL at 07:45

## 2024-08-29 RX ADMIN — ATORVASTATIN CALCIUM 10 MG: 10 TABLET, FILM COATED ORAL at 19:48

## 2024-08-29 RX ADMIN — ACETAMINOPHEN 325MG 650 MG: 325 TABLET ORAL at 19:49

## 2024-08-29 RX ADMIN — ACETAMINOPHEN 325MG 650 MG: 325 TABLET ORAL at 15:18

## 2024-08-29 RX ADMIN — SACUBITRIL AND VALSARTAN 1 TABLET: 49; 51 TABLET, FILM COATED ORAL at 07:48

## 2024-08-29 RX ADMIN — ASPIRIN 81 MG: 81 TABLET, COATED ORAL at 07:46

## 2024-08-29 RX ADMIN — BUPROPION HYDROCHLORIDE 150 MG: 150 TABLET, FILM COATED, EXTENDED RELEASE ORAL at 07:45

## 2024-08-29 RX ADMIN — SODIUM CHLORIDE, PRESERVATIVE FREE 8 ML: 5 INJECTION INTRAVENOUS at 19:51

## 2024-08-29 RX ADMIN — SENNOSIDES AND DOCUSATE SODIUM 1 TABLET: 50; 8.6 TABLET ORAL at 19:49

## 2024-08-29 RX ADMIN — ASPIRIN 81 MG: 81 TABLET, COATED ORAL at 19:48

## 2024-08-29 RX ADMIN — EMPAGLIFLOZIN 10 MG: 10 TABLET, FILM COATED ORAL at 07:46

## 2024-08-29 RX ADMIN — ACETAMINOPHEN 325MG 650 MG: 325 TABLET ORAL at 07:46

## 2024-08-29 RX ADMIN — CARVEDILOL 12.5 MG: 12.5 TABLET, FILM COATED ORAL at 07:45

## 2024-08-29 RX ADMIN — BUSPIRONE HYDROCHLORIDE 15 MG: 5 TABLET ORAL at 07:45

## 2024-08-29 RX ADMIN — SODIUM CHLORIDE, PRESERVATIVE FREE 10 ML: 5 INJECTION INTRAVENOUS at 07:48

## 2024-08-29 RX ADMIN — LEVOTHYROXINE SODIUM 125 MCG: 0.12 TABLET ORAL at 05:39

## 2024-08-29 RX ADMIN — BUSPIRONE HYDROCHLORIDE 15 MG: 5 TABLET ORAL at 19:48

## 2024-08-29 ASSESSMENT — PAIN DESCRIPTION - PAIN TYPE
TYPE: ACUTE PAIN
TYPE: SURGICAL PAIN
TYPE: ACUTE PAIN

## 2024-08-29 ASSESSMENT — PAIN SCALES - GENERAL
PAINLEVEL_OUTOF10: 3
PAINLEVEL_OUTOF10: 3
PAINLEVEL_OUTOF10: 4

## 2024-08-29 ASSESSMENT — PAIN DESCRIPTION - DESCRIPTORS
DESCRIPTORS: ACHING

## 2024-08-29 ASSESSMENT — PAIN - FUNCTIONAL ASSESSMENT
PAIN_FUNCTIONAL_ASSESSMENT: ACTIVITIES ARE NOT PREVENTED

## 2024-08-29 ASSESSMENT — PAIN DESCRIPTION - LOCATION
LOCATION: KNEE

## 2024-08-29 ASSESSMENT — PAIN DESCRIPTION - FREQUENCY
FREQUENCY: INTERMITTENT
FREQUENCY: CONTINUOUS
FREQUENCY: CONTINUOUS

## 2024-08-29 ASSESSMENT — PAIN DESCRIPTION - ORIENTATION
ORIENTATION: LEFT

## 2024-08-29 ASSESSMENT — PAIN DESCRIPTION - ONSET
ONSET: ON-GOING

## 2024-08-29 ASSESSMENT — PAIN SCALES - WONG BAKER: WONGBAKER_NUMERICALRESPONSE: HURTS LITTLE MORE

## 2024-08-29 NOTE — PROGRESS NOTES
Patient admitted to rehab with L knee replacement.  A/Ox4. Transfers with gait belt, walker. Mobility restrictions: WBAT. On regular diet, tolerating well. Medications taken whole with thins. On ASA for DVT prophylaxis.  Skin: scattered bruising, incision. Oxygen: RA. LDA: RAC. Has been continent of bowel and continent of bladder. LBM 8/28. Chair/bed alarms in use and call light in reach. Will monitor for safety.

## 2024-08-29 NOTE — PROGRESS NOTES
Physical Therapy  Facility/Department: [address] [facility]  Rehabilitation Physical Therapy Treatment Note    NAME: Mark Josue  : 1945 (79 y.o.)  MRN: [id number]  CODE STATUS: Full Code    Date of Service: 24       Restrictions:  Restrictions/Precautions: Fall Risk, Weight Bearing  Lower Extremity Weight Bearing Restrictions  Left Lower Extremity Weight Bearing: Weight Bearing As Tolerated     SUBJECTIVE  Subjective  Subjective: Pt notes increased L knee swelling and tenderness surrounding joint - has been icing/elevating LLE with rest.  Pain: 5/10 L knee pain    OBJECTIVE  Cognition  Overall Cognitive Status: WFL  Orientation  Overall Orientation Status: Within Functional Limits    Functional Mobility  Transfers  Surface: Wheelchair;To chair with arms;From chair with arms;From bed;To bed  Additional Factors: Verbal cues;Increased time to complete  Device: Walker  Sit to Stand  Assistance Level: Stand by assist;Contact guard assist  Stand to Sit  Assistance Level: Stand by assist;Contact guard assist  Bed To/From Chair  Technique: Stand step  Assistance Level: Stand by assist;Contact guard assist  Skilled Clinical Factors: with walker    Environmental Mobility  Ambulation  Surface: Level surface  Device: Rolling walker  Distance: 40 ft with 2 turns to access stairs, 15 ft in the room  Activity: Within Unit;Within Room  Additional Factors: Increased time to complete  Assistance Level: Stand by assist;Contact guard assist  Gait Deviations: Slow karla;Decreased weight shift left;Decreased step length right  Skilled Clinical Factors: slight improved step to gait pattern with RW, trunk flexed forward with pushing down on walker - limited by LLE pain, no LOB  Stairs  Stair Height: 6''  Device: Bilateral handrails  Number of Stairs: 4  Additional Factors: Verbal cues;Non-reciprocal going up;Non-reciprocal going down;Increased time to complete  Assistance Level: Stand by assist;Contact guard  bed.    Assessment: Patient has good tolerance for supine exercise to improve AROM of hip/knees in all directions - increased time and effortful LLE. Much improved L quad activation and control as noted by x10 SLR and maintained ~ full knee ext. Continue to progress as tolerated.      Safety Device - Type of devices:  []  All fall risk precautions in place [x] Bed alarm in place  [x] Call light within reach [x] Chair alarm in place [] Positioning belt [x] Gait belt [x] Patient at risk for falls [x] Left in bed [] Left in chair [] Telesitter in use [] Sitter present [] Nurse notified []  Care transferred to  [] Returned to room w/ transport      Therapy Time   Individual Co-treatment   Time In 1315    Time Out 1400    Minutes 45        Electronically signed by Han García PT on 8/29/2024 at 1:41 PM      Therapy Time   Individual Concurrent Group Co-treatment   Time In 1030         Time Out 1120         Minutes 50           Timed Code Treatment Minutes: 50 Minutes       Han García PT, 08/29/24 at 1:41 PM

## 2024-08-29 NOTE — PROGRESS NOTES
Department of Physical Medicine & Rehabilitation  Progress Note    Patient Identification:  Mark Josue  [id number]  : 1945  Admit date: 2024    Chief Complaint: S/P TKR (total knee replacement), left    Subjective:   No acute events overnight.   Patient seen this afternoon working with OT on upper body strengthening. He reports possible increased left knee swelling and asks about changed appearance of old scar. Swelling appears relatively stable to me compared to yesterday. We discussed that appearance of scars can change due to skin stretching in the setting of post op swelling.   Labs reviewed.     ROS: No f/c, n/v, cp     Objective:  Patient Vitals for the past 24 hrs:   BP Temp Temp src Pulse Resp SpO2 Weight   24 1640 (!) 165/94 -- -- 74 -- -- --   24 0900 -- -- -- -- -- 96 % --   24 0739 (!) 148/94 97.6 °F (36.4 °C) Oral 89 18 95 % --   24 0550 -- -- -- -- -- -- 113.5 kg (250 lb 3.6 oz)   24 2000 (!) 154/79 97.7 °F (36.5 °C) Oral 76 18 97 % --     Const: Alert. No distress, pleasant.   HEENT: Normocephalic, atraumatic. Normal sclera/conjunctiva. MMM.   CV: Regular rate and rhythm.   Resp: No respiratory distress. Lungs CTAB.   Abd: Soft, nontender, nondistended, NABS+   Ext/MSK: Left knee post op swelling and decreased ROM, incision c/d/i  Neuro: Alert, oriented, appropriately interactive.   Psych: Cooperative, appropriate mood and affect    Laboratory data: Available via EMR.   Last 24 hour lab  Recent Results (from the past 24 hour(s))   Basic Metabolic Panel w/ Reflex to MG    Collection Time: 24  5:48 AM   Result Value Ref Range    Sodium 134 (L) 136 - 145 mmol/L    Potassium reflex Magnesium see below 3.5 - 5.1 mmol/L    Chloride 100 99 - 110 mmol/L    CO2 23 21 - 32 mmol/L    Anion Gap 11 3 - 16    Glucose 88 70 - 99 mg/dL    BUN 19 7 - 20 mg/dL    Creatinine 0.9 0.8 - 1.3 mg/dL    Est, Glom Filt Rate 88 >60    Calcium 9.1 8.3 - 10.6 mg/dL   SPECIMEN  lower part of legs. He stood to grab bars CGA and washed buttocks and selvin area in stance CGA w/ 1 UE on grab bar. Stood additional time for OT to dry buttocks  UE Dressing  Assistance Level: Set-up  Skilled Clinical Factors: Pt doffed/donned clean shirt set-up  LE Dressing  Equipment Provided: Reachers  Assistance Level: Minimal assistance  Skilled Clinical Factors: Pt in stance in shower holding onto grab bars pulled pants and brief down from hips CGA for balabce. Seated he unthreaded w/ reacher. Pt threaded underwear and cargo shorts by self, he stood to grab bars and required assist to pull over hips in stance d/t fatigue.  Putting On/Taking Off Footwear  Equipment Provided: Sock aid, Reachers  Assistance Level: Minimal assistance  Skilled Clinical Factors: Pt Drake hose on wrong feet (green should be on L and blue on R). Pt doffed/donned R by self with no equipment. Pt required assist to doff L Drake hose d/t limited time, OT assist to place on L foot, pt able to pull up.. Overall Min A  Toileting  Assistance Level: Moderate assistance  Skilled Clinical Factors: Pt reports he just had BM.    Speech therapy:    ADULT DIET; Regular        Body mass index is 30.46 kg/m².    Rehabilitation Diagnosis:   Orthopedic, 8.61, Unilateral Knee Replacement        Assessment and Plan:     Impairments: Decreased left knee ROM, decreased balance      Arthritis left knee   -s/p Left TKA (8/20 with Dr. Deng)  -wound care per Ortho  -PT/OT    Altered mental status (8/22) -- resolved  -Code stroke requested by family (8/22) due to confusion/hallucinations. NIHSS 0  -Hospitalist neuro exam, CT head, and metabolic work-up unremarkable.   -Likely related to hospital environment, post-anesthesia, pain medication in addition to his multiple home anxiety medications.   -Regulate sleep/wake  -Minimize contributing medications  ---Mental status significantly improved with dc of oxycodone     Chronic sCHF  -continue Jardiance (sub for home  Farxiga), carvedilol, Entresto  -Daily wt     CAD  -continue ASA, statin, bb     HTN, uncontrolled  -Has had some symptoms of lightheadedness with standing -- now resolved  -continue carvedilol, Entresto with hold parameters -- monitor trend, overall controlled but increased past 24 hours     Prediabetes with post op hyperglycemia  -Improving, monitor blood sugar intermittently  -Dietitian consult     Hypothyroidism  -continue levothyroxine     TAM  -cotinue CPAP     Anxiety  -continue bupropion, buspirone, duloxetine, lorazepam     Bladder   -High risk retention   -Monitor PVRs, SC prn >400cc     Bowel   -High risk constipation   -senna+colace BID, PRN miralax, MoM, and bisacodyl supp.     Safety   -fall precautions     Pain control  -Dc'ed oxycodone due to cognitive side effectes and changed to low dose tramadol prn -- tolerating this      PPx  -DVT: ASA 81 BID x 14 days post-op per Ortho  -GI: pantoprazole    Rehab Progress: Interdisciplinary team conference was held today with entire rehab treatment team including PT, OT, SLP (if applicable), Dietician, RN, and SW. Discussion focused on progress toward rehab goals and discharge planning. Making progress. Overall CGA-Norma for mobility. Set-up to modA for ADLs. Barriers include: decreased left knee ROM, balance, pain, steps to enter home. We as a medical team, and I as the physician , made a plan to work on these barriers to facilitate safe discharge. Plan will be presented to patient/family (if available).   Anticipated Dispo: home with spouse  Services:  PT, OT, RN  DME: Hip kit, walker basket  ELOS: 9/5      Nuris Salas MD 8/29/2024, 5:52 PM

## 2024-08-29 NOTE — PROGRESS NOTES
Patient admitted to rehab with left total knee replacement.  A/Ox4. Transfers with Walker x 1. Mobility restrictions: WBAT. On Regular diet, tolerating well. Medications taken whole with thin liquids. On ASA for DVT prophylaxis.  Skin: surgical incision to left knee. Oxygen: RA (CPAP at HS). LDA: PIV RFA. Has been continent of bowel and incontinent at times of bladder. LBM 8/28 Chair/bed alarms in use and call light in reach.

## 2024-08-29 NOTE — PLAN OF CARE
Problem: Discharge Planning  Goal: Discharge to home or other facility with appropriate resources  8/29/2024 1128 by Juanita Crane RN  Outcome: Progressing  Flowsheets (Taken 8/29/2024 0739)  Discharge to home or other facility with appropriate resources: Identify barriers to discharge with patient and caregiver  8/29/2024 0104 by Erika Anne RN  Outcome: Progressing  Flowsheets (Taken 8/28/2024 2000)  Discharge to home or other facility with appropriate resources: Identify barriers to discharge with patient and caregiver     Problem: Safety - Adult  Goal: Free from fall injury  8/29/2024 1128 by Juanita Crane RN  Outcome: Progressing  Flowsheets (Taken 8/29/2024 0745)  Free From Fall Injury: Instruct family/caregiver on patient safety  8/29/2024 0104 by Erika Anne RN  Outcome: Progressing  Note: Fall risk band on patient. Orange light on outside of room. Non skid footwear in place. Alarms used appropriately. Patient instructed to call and wait for staff before getting up. Rounding done to anticipate needs. Appropriate safety devices used for transfers.     Problem: Pain  Goal: Verbalizes/displays adequate comfort level or baseline comfort level  8/29/2024 1128 by Juanita Crane RN  Outcome: Progressing  8/29/2024 0104 by Erika Anne RN  Outcome: Progressing     Problem: ABCDS Injury Assessment  Goal: Absence of physical injury  8/29/2024 1128 by Juanita Crane RN  Outcome: Progressing  Flowsheets (Taken 8/29/2024 0745)  Absence of Physical Injury: Implement safety measures based on patient assessment  8/29/2024 0104 by Erika Anne RN  Outcome: Progressing     Problem: Skin/Tissue Integrity  Goal: Absence of new skin breakdown  Description: 1.  Monitor for areas of redness and/or skin breakdown  2.  Assess vascular access sites hourly  3.  Every 4-6 hours minimum:  Change oxygen saturation probe site  4.  Every 4-6 hours:  If on nasal continuous positive airway pressure, respiratory therapy

## 2024-08-29 NOTE — PLAN OF CARE
Problem: Safety - Adult  Goal: Free from fall injury  8/29/2024 0104 by Erika Anne RN  Outcome: Progressing  Note: Fall risk band on patient. Orange light on outside of room. Non skid footwear in place. Alarms used appropriately. Patient instructed to call and wait for staff before getting up. Rounding done to anticipate needs. Appropriate safety devices used for transfers.

## 2024-08-29 NOTE — PROGRESS NOTES
Occupational Therapy  Facility/Department: [address] [facility]  Rehabilitation Occupational Therapy Daily Treatment AM and PM Note    Date: 24  Patient Name: Mark Josue       Room: C0V-2561/3261-01  MRN: [id number]  Account: [id number]   : 1945  (79 y.o.) Gender: male                    Past Medical History:  has a past medical history of Anxiety, CAD (coronary artery disease), Depression, GERD (gastroesophageal reflux disease), Hyperlipidemia, Hypertension, TAM on CPAP, Thyroid disease, and Wears glasses.  Past Surgical History:   has a past surgical history that includes Knee arthroscopy (Right); Shoulder arthroscopy (Left, 15 years ago); Cardiac surgery (); Cholecystectomy (18 years ago); Tibia fracture surgery (Left, 2015); Cataract extraction (Bilateral); Colonoscopy (); and Total knee arthroplasty (Left, 2024).    Restrictions  Restrictions/Precautions: Fall Risk, Weight Bearing  Left Lower Extremity Weight Bearing: Weight Bearing As Tolerated    Subjective  Subjective: Pt met in room, with wife Donna present. Pt reporting pain in L knee rating 5/10. Pt agreeable to OT session.  Restrictions/Precautions: Fall Risk;Weight Bearing             Objective     Cognition  Overall Cognitive Status: WFL  Orientation  Overall Orientation Status: Within Functional Limits         ADL  Putting On/Taking Off Footwear  Assistance Level: Minimal assistance  Skilled Clinical Factors: Pt Drake hose on wrong feet (green should be on L and blue on R). Pt doffed/donned R by self with no equipment. Pt required assist to doff L Drake hose d/t limited time, OT assist to place on L foot, pt able to pull up.. Overall Min A    Instrumental ADL's  Instrumental ADLs: Yes  Meal Prep  Meal Prep Level: Walker  Meal Prep Level of Assistance: Contact guard assistance  Meal Preparation: Pt amb to kitchen w/ RW CGA, retrieved drink from top level of fridge and placed in RW basket. Was ed on RW basket and proper  himself.  Short Term Goals  Time Frame for Short Term Goals: 1.5 weeks from eval  Short Term Goal 1: Pt will complete toileting Min A  Short Term Goal 2: Pt will complete UB/LB bathing w/ use of AE/AD Min A  Short Term Goal 3: Pt will complete UB/LB dressing w/ use of AE/AD Min A  Short Term Goal 4: Pt will complete functional mobility/ADL transfers w/ LRAD Min A  Short Term Goal 5: Pt will complete standing ADL task for > 3 min CGA  Long Term Goals  Time Frame for Long Term Goals : 3 weeks from eval  Long Term Goal 1: Pt will complete toileting Mod I  Long Term Goal 2: Pt will complete UB/LB bathing w/ use of AE/AD Mod I  Long Term Goal 3: Pt will complete UB/LB dressing Mod I  Long Term Goal 4: Pt will complete functional mobility/ADL transfers w/ LRAD Mod I  Long Term Goal 5: Pt will participate in UE HEP to improve activity tolerance/strength to complete simple IADL task in stance for > 5 min Mod I  Long Term Goal 6: Address home DME recommendations in prep for safe DC home        Therapy Time   Individual Concurrent Group Co-treatment   Time In 0945         Time Out 1030         Minutes 45         Timed Code Treatment Minutes: 45 Minutes     Therapy Time     Individual Co-treatment   Time In 1445    Time Out 1530    Minutes 45        KAVYA Dominguez/L

## 2024-08-30 PROCEDURE — 97110 THERAPEUTIC EXERCISES: CPT

## 2024-08-30 PROCEDURE — 2580000003 HC RX 258: Performed by: PHYSICAL MEDICINE & REHABILITATION

## 2024-08-30 PROCEDURE — 6370000000 HC RX 637 (ALT 250 FOR IP): Performed by: PHYSICAL MEDICINE & REHABILITATION

## 2024-08-30 PROCEDURE — 97116 GAIT TRAINING THERAPY: CPT

## 2024-08-30 PROCEDURE — 94760 N-INVAS EAR/PLS OXIMETRY 1: CPT

## 2024-08-30 PROCEDURE — 1280000000 HC REHAB R&B

## 2024-08-30 PROCEDURE — 97530 THERAPEUTIC ACTIVITIES: CPT

## 2024-08-30 PROCEDURE — 97535 SELF CARE MNGMENT TRAINING: CPT

## 2024-08-30 RX ADMIN — CARVEDILOL 12.5 MG: 12.5 TABLET, FILM COATED ORAL at 08:19

## 2024-08-30 RX ADMIN — BUPROPION HYDROCHLORIDE 150 MG: 150 TABLET, FILM COATED, EXTENDED RELEASE ORAL at 21:27

## 2024-08-30 RX ADMIN — SACUBITRIL AND VALSARTAN 1 TABLET: 49; 51 TABLET, FILM COATED ORAL at 21:27

## 2024-08-30 RX ADMIN — EMPAGLIFLOZIN 10 MG: 10 TABLET, FILM COATED ORAL at 08:19

## 2024-08-30 RX ADMIN — BUSPIRONE HYDROCHLORIDE 15 MG: 5 TABLET ORAL at 21:27

## 2024-08-30 RX ADMIN — SODIUM CHLORIDE, PRESERVATIVE FREE 10 ML: 5 INJECTION INTRAVENOUS at 08:19

## 2024-08-30 RX ADMIN — ACETAMINOPHEN 325MG 650 MG: 325 TABLET ORAL at 10:50

## 2024-08-30 RX ADMIN — LEVOTHYROXINE SODIUM 125 MCG: 0.12 TABLET ORAL at 05:57

## 2024-08-30 RX ADMIN — SENNOSIDES AND DOCUSATE SODIUM 1 TABLET: 50; 8.6 TABLET ORAL at 08:19

## 2024-08-30 RX ADMIN — CARVEDILOL 12.5 MG: 12.5 TABLET, FILM COATED ORAL at 16:00

## 2024-08-30 RX ADMIN — LORAZEPAM 0.5 MG: 0.5 TABLET ORAL at 21:27

## 2024-08-30 RX ADMIN — ACETAMINOPHEN 325MG 650 MG: 325 TABLET ORAL at 21:56

## 2024-08-30 RX ADMIN — ATORVASTATIN CALCIUM 10 MG: 10 TABLET, FILM COATED ORAL at 21:27

## 2024-08-30 RX ADMIN — ASPIRIN 81 MG: 81 TABLET, COATED ORAL at 21:27

## 2024-08-30 RX ADMIN — ACETAMINOPHEN 325MG 650 MG: 325 TABLET ORAL at 16:01

## 2024-08-30 RX ADMIN — BUPROPION HYDROCHLORIDE 150 MG: 150 TABLET, FILM COATED, EXTENDED RELEASE ORAL at 08:19

## 2024-08-30 RX ADMIN — ACETAMINOPHEN 325MG 650 MG: 325 TABLET ORAL at 05:57

## 2024-08-30 RX ADMIN — SODIUM CHLORIDE, PRESERVATIVE FREE 10 ML: 5 INJECTION INTRAVENOUS at 21:27

## 2024-08-30 RX ADMIN — SACUBITRIL AND VALSARTAN 1 TABLET: 49; 51 TABLET, FILM COATED ORAL at 08:19

## 2024-08-30 RX ADMIN — ASPIRIN 81 MG: 81 TABLET, COATED ORAL at 08:19

## 2024-08-30 RX ADMIN — DULOXETINE HYDROCHLORIDE 60 MG: 60 CAPSULE, DELAYED RELEASE ORAL at 08:19

## 2024-08-30 RX ADMIN — BUSPIRONE HYDROCHLORIDE 15 MG: 5 TABLET ORAL at 08:19

## 2024-08-30 RX ADMIN — LORAZEPAM 0.5 MG: 0.5 TABLET ORAL at 08:19

## 2024-08-30 RX ADMIN — PANTOPRAZOLE SODIUM 40 MG: 40 TABLET, DELAYED RELEASE ORAL at 05:57

## 2024-08-30 RX ADMIN — SENNOSIDES AND DOCUSATE SODIUM 1 TABLET: 50; 8.6 TABLET ORAL at 21:27

## 2024-08-30 ASSESSMENT — PAIN SCALES - GENERAL
PAINLEVEL_OUTOF10: 5
PAINLEVEL_OUTOF10: 0
PAINLEVEL_OUTOF10: 3
PAINLEVEL_OUTOF10: 0
PAINLEVEL_OUTOF10: 5
PAINLEVEL_OUTOF10: 4

## 2024-08-30 ASSESSMENT — PAIN DESCRIPTION - ORIENTATION
ORIENTATION: LEFT

## 2024-08-30 ASSESSMENT — PAIN DESCRIPTION - FREQUENCY: FREQUENCY: INTERMITTENT

## 2024-08-30 ASSESSMENT — PAIN DESCRIPTION - DESCRIPTORS
DESCRIPTORS: ACHING
DESCRIPTORS: ACHING
DESCRIPTORS: SORE
DESCRIPTORS: ACHING

## 2024-08-30 ASSESSMENT — PAIN DESCRIPTION - LOCATION
LOCATION: KNEE

## 2024-08-30 ASSESSMENT — PAIN DESCRIPTION - ONSET: ONSET: ON-GOING

## 2024-08-30 ASSESSMENT — PAIN DESCRIPTION - PAIN TYPE: TYPE: ACUTE PAIN

## 2024-08-30 ASSESSMENT — PAIN - FUNCTIONAL ASSESSMENT: PAIN_FUNCTIONAL_ASSESSMENT: ACTIVITIES ARE NOT PREVENTED

## 2024-08-30 NOTE — PROGRESS NOTES
Modified Barium Swallowing Study    Past Medical History:   Diagnosis Date    Anxiety     Asthma     Bipolar 1 disorder     Cirrhosis, alcoholic     Cocaine abuse     COPD (chronic obstructive pulmonary disease)     Depression     Schizophrenia     Seizures      Past Surgical History:   Procedure Laterality Date    APPENDECTOMY      CRANIECTOMY Left 01/31/2019    ESOPHAGOGASTRODUODENOSCOPY      ESOPHAGOSCOPY N/A 12/9/2014    Performed [facility] 2ND FLR    LARYNGOSCOPY BRONCHOSCOPY-DIRECT N/A 12/9/2014    Performed by [facility] at [facility] OR 2ND FLR    PEG W/TRACHEOSTOMY PLACEMENT  02/16/2019     Pt was referred to assess swallowing due to long-term trach with [place] Speaking Valve, NPO with PEG tube status, and possible non-compliance with NPO status.  Hx large SDH, hypoxia, craniectomy; now receiving rehab tx at [place].  Pt was accompanied by her respiratory therapist.  Pt was alert & cooperative.   Today, pt was found to have mild oral prep dysphagia due to edentulous status.  Pharyngeal stage was marked by very reduced hyolaryngeal rise & tilt due to presence of trach tube, but remarkably good transit of all boluses except liquids via cup & straw sips.  Thin & nectar thick liquids produced high penetration with micro residue.  Residue was ejected with cued throat clearing.  Chin tuck did not eliminate penetration on either thin or nectar liquids.  A barium pill presented in puree passed lips to stomach without problem.  An esophageal sweep revealed nothing which affected the pharyngeal swallow.     Of note, pt produced strong coughing after initial bolus of puree, altho there was no penetration or aspiration seen.  Prior to that, she had produced small, congested coughs.   Recommend mech soft tetxures, finely chopped meats, thin liquids.  Pt should be educated to take small sips of liquid.  Training to produce throat clearing after sips of liquid will address  Patient admitted to rehab with left total knee replacement.  A/Ox4. Transfers with Walker x 1. Mobility restrictions: WBAT. On Regular diet, tolerating well. Medications taken whole with thin liquids. On ASA for DVT prophylaxis.  Skin: surgical incision to left knee. Oxygen: RA (CPAP at HS). LDA: PIV RFA. Has been continent of bowel and incontinent at times of bladder. LBM 8/28 Chair/bed alarms in use and call light in reach    micro-penetration.  G Codes Swallowing Current CI     Goal CH     D/C CI     07/15/19 1000   Speech Time Calculation   Speech Start Time 1020   Speech Stop Time 1040   Speech Total (min) 20 min   General Information   SLP Treatment Date 07/15/19   Current Respiratory Status speaking valve   General Precautions aspiration;fall;NPO   Current Diet   Current Diet Textures NPO   Current Liquid Consistencies NPO   Current Non-Oral Diet PEG tube   Subjective   Patient states I want to eat   Oral Musculature Evaluation   Oral Musculature WFL   Dentition edentulous   Secretion Management adequate   Mucosal Quality adequate   Mandibular Strength and Mobility WFL   Oral Labial Strength and Mobility WFL   Lingual Strength and Mobility WFL   Velar Elevation WFL   Buccal Strength and Mobility WFL   Volitional Cough weak   Voice Prior to PO Intake clear, mild dysarthria        MBS Eval: Thin Liquid Trial   Mode of Presentation, Thin Liquid spoon;fed by clinician;cup;straw;self fed   Volume of Thin Liquid Presented tsp, cup sip, straw sip, serial straw swallows   Oral Prep/Phase, Thin Liquid WFL   Pharyngeal Phase, Thin Liquid impaired;reduced hyolaryngeal excursion;reduction in laryngeal elevation;delayed epiglottic inversion   Rosenbeck's 8-Point Penetration-Aspiration Scale, Thin Liquids (3) Material enters the airway, remains above the vocal folds, and is not ejected from the airway.   Penetration/Aspiration, Thin Liquid high penetration with micro residue, ejected with cued throat clear   Esophageal Phase, Thin aerophagia   Successful Strategies Trialed During Procedure, Thin Liquid decreased bolus size  (tsp sized had no penetration)   Additional Comments Mild pharyngeal dysphagia        MBS Eval: Nectar Thick Liquid Trial   Mode of Presentation, Nectar spoon;fed by clinician;straw;self fed   Volume of Nectar Presented tsp, straw sip   Oral Prep/Phase, [place] WFL   Pharyngeal Phase, Nectar reduction in laryngeal  elevation;reduced hyolaryngeal excursion;delayed pharyngeal swallow   Rosenbeck's 8-Point Penetration-Aspiration Scale, Nectar Thick Liquids (3) Material enters the airway, remains above the vocal folds, and is not ejected from the airway.;(1) Material does not enter airway.  (did not penetrate on spoon sized boluses)   Penetration/Aspiration, Nectar high penetration; ejected with cued throat clear   Successful Strategies Trialed During Procedure, Nectar   (throat clear)   Diagnostic Statement Mild pharygneal dysphagia        MBS Eval: Pureed Trial   Mode of Presentation, Puree spoon   Volume of Puree Presented tsp, tsp woth barium pill   Oral Prep/Phase, Puree WFL   Pharyngeal Phase, Puree impaired;reduction in laryngeal elevation;reduced hyolaryngeal excursion   Rosenbeck's 8-Point Penetration-Aspiration Scale, Pureed (1) Material does not enter airway.   Diagnostic Statement WFL oropharyngeal stages        MBS Eval: Soft Solid Trial   Mode of Presentation, Semisolid spoon   Volume of Semisolid Food Presented tsp peaches in thin barium   Oral Prep/Phase, Semisolid prolonged chewing  (due to edentulous)   Pharyngeal Phase, Semisolid reduction in laryngeal elevation;reduced hyolaryngeal excursion   Rosenbeck's 8-Point Penetration-Aspiration Scale, Semisolid (1) Material does not enter airway.   Diagnostic Statement mild oral prep dysphagia, WFL pharyngeal stage        MBS Eval: Solid Food Texture Trial   Mode of Presentation, Solid spoon   Volume of Solid Food Presented 1/2 cookie with barium pudding   Oral Prep/Phase, Solid prolonged chewing  (bolus not entirely well chewed)   Pharyngeal Phase, Solid impaired;reduction in laryngeal elevation;reduced hyolaryngeal excursion   Rosenbeck's 8-Point Penetration-Aspiration Scale, Solid (1) Material does not enter airway.   Diagnostic Statement Mild oral prep dysphagia, WFL pharyngeal stage.   Recommendations   Solid Diet Level Mechanical soft;Finely chopped meat   Liquid  Diet Level Thin   Additional Recommendations meds whole in puree   Plan   Plan Dysphagia Therapy  (teach swallowing precautions)   SLP Follow-up   SLP Follow-up? Yes   Treatment/Billable Minutes   Eval Swallow and Oral Function 10   Motion Fluoro Swallow, Cine/Vid 10   Total Time 20

## 2024-08-30 NOTE — CARE COORDINATION
Team Conference held on Thursday, 8-  Team reviewed barriers:  L knee pain, Decreased balance, decreased range of motion.  Team recommends continued stay on rehab to further his progress with DC planned for Thursday, 9-5-2024.  DME recs:   3 piece hip kit, walker basket or bag.  Home Care orders for SN/PT/OT.    Met with patient and wife to review.  [facility] while wife is outside the home.  They have been approved for 15 hours weekly.  They are in agreement of home care.  Informed them [facility] had called me to report they have been approved by [facility] for [facility] care.  Informed them  is between 10 - 12 noon.  He reports he has a follow up appt with Dr Deng at 11 am on Thursday, 9-5- and they were hopeful he could be seen here.  Placed this request on Sticky note.   Wife to obtain DME from [facility].    They both are in agreement with this plan.    SOL Bocanegra     Case Management   [phone]    8/30/2024  3:10 PM

## 2024-08-30 NOTE — PROGRESS NOTES
Comprehensive Nutrition Assessment    Type and Reason for Visit:  Reassess    Nutrition Recommendations/Plan:   Continue current diet     Malnutrition Assessment:  Malnutrition Status:  At risk for malnutrition (Comment) (Continue to monitor intake) (08/30/24 1015)    Context:  Acute Illness     Findings of the 6 clinical characteristics of malnutrition:  Energy Intake:  No significant decrease in energy intake  Weight Loss:  No significant weight loss     Body Fat Loss:  No significant body fat loss     Muscle Mass Loss:  No significant muscle mass loss    Fluid Accumulation:  Mild Extremities   Strength:  Not Performed    Nutrition Assessment:    F/u - Pt without any complaints/concerns. Denies NVDC and abd pain. With adequate intake %. Few meals with poor intake, pt states he is getting used to the food and chose something he didn't like. Discussed prioritizing protein foods for healing as he does not want an ONS. Will continue to monitor.    Nutrition Related Findings:    liver labs, LBM 8/28 Wound Type: Surgical Incision (no issues noted)       Current Nutrition Intake & Therapies:    Average Meal Intake: %, 0%  Average Supplements Intake: None Ordered  ADULT DIET; Regular    Anthropometric Measures:  Height: 193 cm (6' 4\")  Ideal Body Weight (IBW): 202 lbs (92 kg)    Admission Body Weight: 134.7 kg (297 lb)  Current Body Weight: 114.2 kg (251 lb 12.3 oz),   IBW. Weight Source: Bed Scale  Current BMI (kg/m2): 30.7  Usual Body Weight:  (250-260 lbs)                       BMI Categories: Obese Class 1 (BMI 30.0-34.9)    Estimated Daily Nutrient Needs:        Energy (kcal/day): 3413-4535 (15-18 x  kg)     Protein (g/day): 110-138 (1.2-1.5 x IBW 92 kg)  Method Used for Fluid Requirements: 1 ml/kcal  Fluid (ml/day): 1140 ml    Nutrition Diagnosis:   Increased nutrient needs related to increase demand for energy/nutrients as evidenced by wounds (surgical incision)    Nutrition Interventions:

## 2024-08-30 NOTE — PROGRESS NOTES
Department of Physical Medicine & Rehabilitation  Progress Note    Patient Identification:  Mark Josue  [id number]  : 1945  Admit date: 2024    Chief Complaint: S/P TKR (total knee replacement), left    Subjective:   No acute events overnight.   Patient seen this afternoon sitting up In bed. He reports ongoing left knee pain and swelling. Provided education on expected swelling post knee replacement. He has been diligent with icing and elevating between therapy sessions.   Labs reviewed.     ROS: No f/c, n/v, cp     Objective:  Patient Vitals for the past 24 hrs:   BP Temp Temp src Pulse Resp SpO2 Weight   24 0850 -- -- -- -- -- 97 % --   24 0802 (!) 153/83 97.4 °F (36.3 °C) Oral 84 18 97 % --   24 0600 -- -- -- -- -- -- 114.2 kg (251 lb 12.3 oz)   24 0408 -- -- -- -- -- -- 113.1 kg (249 lb 5.4 oz)   24 2000 (!) 152/87 97.6 °F (36.4 °C) Oral 72 18 96 % --   24 1640 (!) 165/94 -- -- 74 -- -- --     Const: Alert. No distress, pleasant.   HEENT: Normocephalic, atraumatic. Normal sclera/conjunctiva. MMM.   CV: Regular rate and rhythm.   Resp: No respiratory distress. Lungs CTAB.   Abd: Soft, nontender, nondistended, NABS+   Ext/MSK: Left knee post op swelling and decreased ROM, incision c/d/i  Neuro: Alert, oriented, appropriately interactive.   Psych: Cooperative, appropriate mood and affect    Laboratory data: Available via EMR.   Last 24 hour lab  No results found for this or any previous visit (from the past 24 hour(s)).        Therapy progress:  Physical therapy:  Bed Mobility:  Overall Assistance Level: Supervision  Additional Factors: Head of bed flat, Without handrails, Increased time to complete  Sit>supine:  Assistance Level: Supervision  Supine>sit:  Assistance Level: Supervision  Skilled Clinical Factors: HOB slight elevated, needs assist to doff ice machine LLE  Transfers:  Surface: Wheelchair, To chair with arms, From chair with arms, From bed, To  therapy:   Feeding     Grooming/Oral Hygiene  Assistance Level: Set-up  Skilled Clinical Factors: Pt seated in WC at sink brushed teeth.  UE Bathing  Assistance Level: Supervision  Skilled Clinical Factors: OT covered IV site. Pt washed/dried UB by self including abd folds,  was ed on using LH sponge for back. He managed hose and water temp by self.  LE Bathing  Equipment Provided: Long-handled sponge  Assistance Level: Stand by assist  Skilled Clinical Factors: Pt washed/dried LB using LH sponge for feet and lower part of legs. He 1/2 stood to grab bars supervision and washed buttocks and selvin area in stance CGA w/ 1 UE on grab bar. Stood additional time for OT to dry buttocks  UE Dressing  Assistance Level: Set-up  Skilled Clinical Factors: Pt doffed/donned clean shirt set-up  LE Dressing  Equipment Provided: Reachers  Assistance Level: Minimal assistance  Skilled Clinical Factors: Pt in stance at RW pulled pants and brief down from hips SBA for balance. Seated he unthreaded w/ reacher. Threaded brief with cues to hold reacher to get LLE into opening. Pt donned pants by self, CGA to pull clothing over hips in stance.  Putting On/Taking Off Footwear  Equipment Provided: Sock aid, Reachers  Assistance Level: Minimal assistance  Skilled Clinical Factors: Pt doffed hospital socks with reacher. Pt donned L Drake hose with sock aide and min A over heel (foot moist from shower), donned L sock with sock aide by self. Pt donned R Drake hose by corssing RLE, no sock aide  Toileting  Assistance Level: Moderate assistance  Skilled Clinical Factors: Pt reports he just had BM.    Speech therapy:    ADULT DIET; Regular        Body mass index is 30.65 kg/m².    Rehabilitation Diagnosis:   Orthopedic, 8.61, Unilateral Knee Replacement        Assessment and Plan:     Impairments: Decreased left knee ROM, decreased balance      Arthritis left knee   -s/p Left TKA (8/20 with Dr. Deng)  -wound care per Ortho  -PT/OT    Altered mental

## 2024-08-30 NOTE — PROGRESS NOTES
Patient admitted to rehab with L knee replacement.  A/Ox4. Transfers with gait belt, walker. Mobility restrictions: WBAT. On regular diet, tolerating well. Medications taken whole with thins. On ASA for DVT prophylaxis.  Skin: incision, generalized bruising. Oxygen: RA. LDA: LFA. Has been continent of bowel and continent of bladder. LBM 8/28. Chair/bed alarms in use and call light in reach. Will monitor for safety.

## 2024-08-30 NOTE — PLAN OF CARE
Problem: Discharge Planning  Goal: Discharge to home or other facility with appropriate resources  8/30/2024 1213 by Juanita Crane RN  Outcome: Progressing  Flowsheets (Taken 8/30/2024 0802)  Discharge to home or other facility with appropriate resources: Identify barriers to discharge with patient and caregiver  8/30/2024 0150 by Erika Anne RN  Outcome: Progressing  Flowsheets (Taken 8/29/2024 1930)  Discharge to home or other facility with appropriate resources: Identify barriers to discharge with patient and caregiver     Problem: Safety - Adult  Goal: Free from fall injury  8/30/2024 1213 by Juanita Crane RN  Outcome: Progressing  Flowsheets (Taken 8/30/2024 0802)  Free From Fall Injury: Instruct family/caregiver on patient safety  8/30/2024 0150 by Erika Anne RN  Outcome: Progressing  Note: Fall risk band on patient. Orange light on outside of room. Non skid footwear in place. Alarms used appropriately. Patient instructed to call and wait for staff before getting up. Rounding done to anticipate needs. Appropriate safety devices used for transfers.     Problem: Pain  Goal: Verbalizes/displays adequate comfort level or baseline comfort level  8/30/2024 1213 by Juanita Crane RN  Outcome: Progressing  8/30/2024 0150 by Erika Anne RN  Outcome: Progressing     Problem: ABCDS Injury Assessment  Goal: Absence of physical injury  8/30/2024 1213 by Juanita Crane RN  Outcome: Progressing  8/30/2024 0150 by Erika Anne RN  Outcome: Progressing     Problem: Skin/Tissue Integrity  Goal: Absence of new skin breakdown  Description: 1.  Monitor for areas of redness and/or skin breakdown  2.  Assess vascular access sites hourly  3.  Every 4-6 hours minimum:  Change oxygen saturation probe site  4.  Every 4-6 hours:  If on nasal continuous positive airway pressure, respiratory therapy assess nares and determine need for appliance change or resting period.  8/30/2024 1213 by Juanita Crane RN  Outcome:

## 2024-08-30 NOTE — PROGRESS NOTES
Occupational Therapy  Facility/Department: [address] [facility]  Rehabilitation Occupational Therapy Daily Treatment AM and PM Note    Date: 24  Patient Name: Mark Josue       Room: T5Q-8146/3261-01  MRN: [id number]  Account: [id number]   : 1945  (79 y.o.) Gender: male         Past Medical History:  has a past medical history of Anxiety, CAD (coronary artery disease), Depression, GERD (gastroesophageal reflux disease), Hyperlipidemia, Hypertension, TAM on CPAP, Thyroid disease, and Wears glasses.  Past Surgical History:   has a past surgical history that includes Knee arthroscopy (Right); Shoulder arthroscopy (Left, 15 years ago); Cardiac surgery (); Cholecystectomy (18 years ago); Tibia fracture surgery (Left, 2015); Cataract extraction (Bilateral); Colonoscopy (); and Total knee arthroplasty (Left, 2024).    Restrictions  Restrictions/Precautions: Fall Risk, Weight Bearing  Left Lower Extremity Weight Bearing: Weight Bearing As Tolerated    Subjective  Subjective: Pt met in room, supine in bed. Pt reporting pain in L knee rating 8/10. Agreeable to therapy.  Restrictions/Precautions: Fall Risk;Weight Bearing             Objective     Cognition  Overall Cognitive Status: WFL  Orientation  Overall Orientation Status: Within Functional Limits         ADL  Upper Extremity Bathing  Assistance Level: Supervision  Skilled Clinical Factors: OT covered IV site. Pt washed/dried UB by self including abd folds,  was ed on using LH sponge for back. He managed hose and water temp by self.  Lower Extremity Bathing  Assistance Level: Stand by assist  Skilled Clinical Factors: Pt washed/dried LB using LH sponge for feet and lower part of legs. He 1/2 stood to grab bars supervision and washed buttocks and selvin area in stance CGA w/ 1 UE on grab bar. Stood additional time for OT to dry buttocks  Upper Extremity Dressing  Assistance Level: Set-up  Skilled Clinical Factors: Pt doffed/donned clean  shirt set-up  Lower Extremity Dressing  Equipment Provided: Reachers  Skilled Clinical Factors: Pt in stance at RW pulled pants and brief down from hips SBA for balance. Seated he unthreaded w/ reacher. Threaded brief with cues to hold reacher to get LLE into opening. Pt donned pants by self, CGA to pull clothing over hips in stance.  Putting On/Taking Off Footwear  Skilled Clinical Factors: Pt doffed hospital socks with reacher. Pt donned L Drake hose with sock aide and min A over heel (foot moist from shower), donned L sock with sock aide by self. Pt donned R Drake hose by corssing RLE, no sock aide  Tub/Shower Transfers  Type: Shower  Transfer To: Tub transfer bench  Assistance Level: Contact guard assist;Stand by assist  Skilled Clinical Factors: RW > TTB SBA/CGA, cues for hand palcement.          Functional Mobility  Device: Rolling walker  Activity: To/From bathroom  Assistance Level: Contact guard assist;Stand by assist  Skilled Clinical Factors: Pt amb bed > bathroom w RW SBA/CGA.  Bed To/From Chair  Assistance Level: Contact guard assist;Stand by assist  Skilled Clinical Factors: RW > recliner, cues to keep RW with him during turn         PM Session:    Pt met in room, Dr. Salas and ADA Tan present speaking with pt and pt's wife. Session late to start; pt agreeable to therapy.     Pt sitting EOB was ed on and completed UE therex w/ red resistance band. Encouraged to complete over weekend to maintain strength/activity tolerance for carryover to transfers/ADL tasks.      Access Code: [email or web address]  URL: [email or web address]  Date: 08/30/2024  Prepared by: Coni Booker    Exercises  - Seated Elbow Flexion with Self-Anchored Resistance  - 15 reps  - Seated Elbow Extension with Self-Anchored Resistance  15 reps  - Seated Single Shoulder Retraction with Resistance  15 reps  - Seated Shoulder Horizontal Abduction with Resistance 15 reps  - Seated Shoulder Flexion with Self-Anchored Resistance 15 reps  -

## 2024-08-30 NOTE — PROGRESS NOTES
Physical Therapy  Facility/Department: [address] [facility]  Rehabilitation Physical Therapy Treatment Note    NAME: Mark Josue  : 1945 (79 y.o.)  MRN: [id number]  CODE STATUS: Full Code    Date of Service: 24       Restrictions:  Restrictions/Precautions: Fall Risk, Weight Bearing  Lower Extremity Weight Bearing Restrictions  Left Lower Extremity Weight Bearing: Weight Bearing As Tolerated     SUBJECTIVE  Subjective  Subjective: Patient supine in bed, ice machine donned. No complaints other than swelling L knee.  Pain: 5/10 L knee pain >>> 8/10 following session    OBJECTIVE  Cognition  Overall Cognitive Status: WFL  Orientation  Overall Orientation Status: Within Functional Limits    Functional Mobility  Bed Mobility  Overall Assistance Level: Supervision  Additional Factors: Head of bed flat;Without handrails;Increased time to complete  Sit to Supine  Assistance Level: Supervision  Supine to Sit  Assistance Level: Supervision  Skilled Clinical Factors: HOB slight elevated, needs assist to doff ice machine LLE  Scooting  Assistance Level: Supervision  Balance  Sitting Balance: Supervision  Standing Balance: Stand by assistance (RW)  Standing Balance  Time: 3 mins  Comments: used urinal while seated in the chair  Transfers  Surface: Wheelchair;To chair with arms;From chair with arms;From bed;To bed  Additional Factors: Verbal cues;Increased time to complete  Device: Walker  Sit to Stand  Assistance Level: Stand by assist  Stand to Sit  Assistance Level: Stand by assist  Bed To/From Chair  Technique: Stand step  Assistance Level: Stand by assist  Skilled Clinical Factors: with walker      Environmental Mobility  Ambulation  Surface: Level surface  Device: Rolling walker  Distance: 40 ft x 2 with a few turns to access stairs  Activity: Within Unit;Within Room  Additional Factors: Increased time to complete  Assistance Level: Stand by assist  Gait Deviations: Slow karla;Decreased weight shift  Care transferred to  [] Returned to room w/ transport      Therapy Time   Individual Co-treatment   Time In 1230    Time Out 1300    Minutes 30        Electronically signed by Han García PT on 8/30/2024 at 1:16 PM        Therapy Time   Individual Concurrent Group Co-treatment   Time In 0845         Time Out 0945         Minutes 60           Timed Code Treatment Minutes: 60 Minutes       Han García, PT, 08/30/24 at 1:16 PM

## 2024-08-31 PROCEDURE — 1280000000 HC REHAB R&B

## 2024-08-31 PROCEDURE — 97110 THERAPEUTIC EXERCISES: CPT

## 2024-08-31 PROCEDURE — 97535 SELF CARE MNGMENT TRAINING: CPT

## 2024-08-31 PROCEDURE — 94760 N-INVAS EAR/PLS OXIMETRY 1: CPT

## 2024-08-31 PROCEDURE — 6370000000 HC RX 637 (ALT 250 FOR IP): Performed by: PHYSICAL MEDICINE & REHABILITATION

## 2024-08-31 PROCEDURE — 2580000003 HC RX 258: Performed by: PHYSICAL MEDICINE & REHABILITATION

## 2024-08-31 PROCEDURE — 97530 THERAPEUTIC ACTIVITIES: CPT

## 2024-08-31 PROCEDURE — 97116 GAIT TRAINING THERAPY: CPT

## 2024-08-31 RX ADMIN — BUSPIRONE HYDROCHLORIDE 15 MG: 5 TABLET ORAL at 21:38

## 2024-08-31 RX ADMIN — CARVEDILOL 12.5 MG: 12.5 TABLET, FILM COATED ORAL at 09:19

## 2024-08-31 RX ADMIN — EMPAGLIFLOZIN 10 MG: 10 TABLET, FILM COATED ORAL at 09:18

## 2024-08-31 RX ADMIN — LEVOTHYROXINE SODIUM 125 MCG: 0.12 TABLET ORAL at 06:18

## 2024-08-31 RX ADMIN — FLUTICASONE PROPIONATE 1 SPRAY: 50 SPRAY, METERED NASAL at 09:22

## 2024-08-31 RX ADMIN — ACETAMINOPHEN 325MG 650 MG: 325 TABLET ORAL at 21:38

## 2024-08-31 RX ADMIN — LORAZEPAM 0.5 MG: 0.5 TABLET ORAL at 21:38

## 2024-08-31 RX ADMIN — BUSPIRONE HYDROCHLORIDE 15 MG: 5 TABLET ORAL at 09:19

## 2024-08-31 RX ADMIN — PANTOPRAZOLE SODIUM 40 MG: 40 TABLET, DELAYED RELEASE ORAL at 06:18

## 2024-08-31 RX ADMIN — BUPROPION HYDROCHLORIDE 150 MG: 150 TABLET, FILM COATED, EXTENDED RELEASE ORAL at 21:38

## 2024-08-31 RX ADMIN — CARVEDILOL 12.5 MG: 12.5 TABLET, FILM COATED ORAL at 15:35

## 2024-08-31 RX ADMIN — LORAZEPAM 0.5 MG: 0.5 TABLET ORAL at 09:18

## 2024-08-31 RX ADMIN — BUPROPION HYDROCHLORIDE 150 MG: 150 TABLET, FILM COATED, EXTENDED RELEASE ORAL at 09:18

## 2024-08-31 RX ADMIN — DULOXETINE HYDROCHLORIDE 60 MG: 60 CAPSULE, DELAYED RELEASE ORAL at 09:18

## 2024-08-31 RX ADMIN — ATORVASTATIN CALCIUM 10 MG: 10 TABLET, FILM COATED ORAL at 21:38

## 2024-08-31 RX ADMIN — ASPIRIN 81 MG: 81 TABLET, COATED ORAL at 21:38

## 2024-08-31 RX ADMIN — SACUBITRIL AND VALSARTAN 1 TABLET: 49; 51 TABLET, FILM COATED ORAL at 21:38

## 2024-08-31 RX ADMIN — SODIUM CHLORIDE, PRESERVATIVE FREE 10 ML: 5 INJECTION INTRAVENOUS at 21:43

## 2024-08-31 RX ADMIN — ASPIRIN 81 MG: 81 TABLET, COATED ORAL at 09:19

## 2024-08-31 RX ADMIN — ACETAMINOPHEN 325MG 650 MG: 325 TABLET ORAL at 09:18

## 2024-08-31 RX ADMIN — SODIUM CHLORIDE, PRESERVATIVE FREE 10 ML: 5 INJECTION INTRAVENOUS at 09:23

## 2024-08-31 RX ADMIN — SACUBITRIL AND VALSARTAN 1 TABLET: 49; 51 TABLET, FILM COATED ORAL at 09:18

## 2024-08-31 RX ADMIN — ACETAMINOPHEN 325MG 650 MG: 325 TABLET ORAL at 15:36

## 2024-08-31 ASSESSMENT — PAIN DESCRIPTION - ONSET
ONSET: ON-GOING

## 2024-08-31 ASSESSMENT — PAIN DESCRIPTION - FREQUENCY
FREQUENCY: INTERMITTENT

## 2024-08-31 ASSESSMENT — PAIN DESCRIPTION - PAIN TYPE
TYPE: ACUTE PAIN

## 2024-08-31 ASSESSMENT — PAIN - FUNCTIONAL ASSESSMENT
PAIN_FUNCTIONAL_ASSESSMENT: ACTIVITIES ARE NOT PREVENTED

## 2024-08-31 ASSESSMENT — PAIN DESCRIPTION - LOCATION
LOCATION: KNEE

## 2024-08-31 ASSESSMENT — PAIN DESCRIPTION - ORIENTATION
ORIENTATION: LEFT

## 2024-08-31 ASSESSMENT — PAIN SCALES - GENERAL
PAINLEVEL_OUTOF10: 0
PAINLEVEL_OUTOF10: 3
PAINLEVEL_OUTOF10: 5
PAINLEVEL_OUTOF10: 4
PAINLEVEL_OUTOF10: 0
PAINLEVEL_OUTOF10: 5

## 2024-08-31 ASSESSMENT — PAIN DESCRIPTION - DESCRIPTORS
DESCRIPTORS: ACHING

## 2024-08-31 NOTE — PLAN OF CARE
Problem: Discharge Planning  Goal: Discharge to home or other facility with appropriate resources  8/30/2024 2345 by Whit Guzman RN  Outcome: Progressing  Flowsheets (Taken 8/30/2024 2134)  Discharge to home or other facility with appropriate resources: Identify barriers to discharge with patient and caregiver     Problem: Safety - Adult  Goal: Free from fall injury  8/30/2024 2345 by Whit Guzman RN  Outcome: Progressing     Problem: Pain  Goal: Verbalizes/displays adequate comfort level or baseline comfort level  8/30/2024 2345 by Whit Guzman RN  Outcome: Progressing     Problem: ABCDS Injury Assessment  Goal: Absence of physical injury  8/30/2024 2345 by Whit Guzman RN  Outcome: Progressing     Problem: Skin/Tissue Integrity  Goal: Absence of new skin breakdown  Description: 1.  Monitor for areas of redness and/or skin breakdown  2.  Assess vascular access sites hourly  3.  Every 4-6 hours minimum:  Change oxygen saturation probe site  4.  Every 4-6 hours:  If on nasal continuous positive airway pressure, respiratory therapy assess nares and determine need for appliance change or resting period.  8/30/2024 2345 by Whit Guzman RN  Outcome: Progressing     Problem: Nutrition Deficit:  Goal: Optimize nutritional status  8/30/2024 2345 by Whit Guzman RN  Outcome: Progressing

## 2024-08-31 NOTE — PROGRESS NOTES
Physical Therapy  Facility/Department: [address] [facility]  Rehabilitation Physical Therapy Treatment Note    NAME: Mark Josue  : 1945 (79 y.o.)  MRN: [id number]  CODE STATUS: Full Code    Date of Service: 24       Restrictions:  Restrictions/Precautions: Fall Risk, Weight Bearing  Lower Extremity Weight Bearing Restrictions  Left Lower Extremity Weight Bearing: Weight Bearing As Tolerated     SUBJECTIVE  Subjective  Subjective: Pt returning to bed with OT as PT arrived.  Pain: 5/10 L knee pain, has had tylenol, cannot take anything stronger due to med interactions/reactions       OBJECTIVE  Cognition  Overall Cognitive Status: WFL  Orientation  Overall Orientation Status: Within Functional Limits    Functional Mobility  Bed Mobility  Overall Assistance Level: Supervision  Additional Factors: Head of bed flat;Without handrails;Increased time to complete  Bridging  Assistance Level: Modified independent  Sit to Supine  Assistance Level: Supervision  Scooting  Assistance Level: Modified independent    PT Exercises  Exercise Treatment: supine per HEP: AP, GS, QS, add sets, heel slides with sliding sheet, hip abduction slides with sliding sheet all x 30 (some with rest breaks), SLR x 20.  Ice machine applied after therapy.      ASSESSMENT/PROGRESS TOWARDS GOALS       Assessment  Assessment: Pt is a 79 y.o. M. admitted  s/p L TKA at [facility]. Pt seen this morning as he was returning to bed after OT session: bed mobility S to modif I.  Pt performed supine ex per HEP this morning, will continue with funcitonal mobility after lunch. Slightly more limited on SLR by pain and increased edema. Patient benefits from therapy at high frequency on ARU to maximize his strength, ROM, balance, endurance, independence with mobility tasks, and to reduce knee pain and swelling. Anticipate safe return home on  with PRN assist from spouse and HHPT.  Activity Tolerance: Patient limited by pain;Patient tolerated

## 2024-08-31 NOTE — PROGRESS NOTES
Occupational Therapy  Facility/Department: [address] [facility]  Rehabilitation Occupational Therapy Daily Treatment Note    Date: 24  Patient Name: Mark Josue       Room: Q5B-9853/3261-01  MRN: [id number]  Account: [id number]   : 1945  (79 y.o.) Gender: male                    Past Medical History:  has a past medical history of Anxiety, CAD (coronary artery disease), Depression, GERD (gastroesophageal reflux disease), Hyperlipidemia, Hypertension, TAM on CPAP, Thyroid disease, and Wears glasses.  Past Surgical History:   has a past surgical history that includes Knee arthroscopy (Right); Shoulder arthroscopy (Left, 15 years ago); Cardiac surgery (); Cholecystectomy (18 years ago); Tibia fracture surgery (Left, 2015); Cataract extraction (Bilateral); Colonoscopy (); and Total knee arthroplasty (Left, 2024).    Restrictions  Restrictions/Precautions: Fall Risk, Weight Bearing  Left Lower Extremity Weight Bearing: Weight Bearing As Tolerated    Subjective  Subjective: Pt seen bedside and agreed to OT treatment.  Restrictions/Precautions: Fall Risk;Weight Bearing             Objective               ADL  Grooming/Oral Hygiene  Assistance Level: Supervision  Skilled Clinical Factors: Pt stood at the sink to complete oral care, comb his hair.  Upper Extremity Dressing  Assistance Level: Set-up  Skilled Clinical Factors: Pt gathered clothing from closet using the RW.  Lower Extremity Dressing  Equipment Provided: Reachers  Assistance Level: Contact guard assist  Skilled Clinical Factors: Used the reacher to petar left leg in holes of depends and pants.  Pt donned right foot without AE.  Pt stood with CGA to pull up depends and pants.  Putting On/Taking Off Footwear  Equipment Provided: Sock aid;Reachers  Assistance Level: Stand by assist  Skilled Clinical Factors: He used his reacher to doff both socks.  Pt placed the GRAHAM half on the sock aid as he had difficulty pulling the GRAHAM all the  way on the sock aid.  This placed the GRAHAM past his right heel.  He then was able to lean down and pull the GRAHAM up the rest of the way.  He donned Left GRAHAM with out AE.  He donned his left  sock using sock aid with cues to pull the sock all the way on the sock aid.  Pt able to cross his left leg to petar left sock.          Functional Mobility  Device: Rolling walker  Activity: To/From bathroom  Assistance Level: Contact guard assist;Stand by assist  Supine to Sit  Assistance Level: Stand by assist  Skilled Clinical Factors: HOB slightly elevated.  Sit to Stand  Assistance Level: Contact guard assist;Stand by assist  Stand to Sit  Assistance Level: Contact guard assist;Stand by assist  Bed To/From Chair  Assistance Level: Contact guard assist;Stand by assist  Skilled Clinical Factors: Transfer to chair with arms at the sink, recliner, bed.   OT Exercises  Exercise Treatment: Completed UE exercise using 3 lb weights.  Completed overhead press, chest press, horizontal ab/adduction, elbow flexion/extension x 15 reps.     Assessment  Assessment  Assessment: Pt tolerated session well.  Pt was unaware of therapy this date but participated.  After ADL activities pt did ask to stay in his room instead of going to the gym to finish therapy.  He completed dressing tasks this date using reacher, and sock aid.  He completed grooming seated at the sink with setup.  He completed mobility in the room using a RW with CGA/SBA.  He finished with UE exrecise.  Plan is DC home Thursday 9/5 with HHOT and PRN assist from his wife.  Activity Tolerance: Patient limited by pain;Patient tolerated treatment well;Patient limited by fatigue  Discharge Recommendations: S Level 1;Home with assist PRN;Patient would benefit from continued therapy after discharge;Home with Home health OT  OT Equipment Recommendations  Other: Cont to assess- hip kit, has TSF/raised toilet seat and shower chair  Safety Devices  Type of devices: Gait belt (PT with pt  Time-based billing (NON-critical care)

## 2024-08-31 NOTE — PROGRESS NOTES
Patient admitted to rehab with left total knee replacement.  A/Ox4. Transfers with walker x1. Mobility restrictions: WBAT. On regular diet, tolerating well. Medications taken whole with thins. On aspirin, lenore hose for DVT prophylaxis.  Skin: surgical incision to left knee. Oxygen: RA. LDA: PIV right forearm. Has been continent of bowel and continent of bladder. LBM 8/31/24. Chair/bed alarms in use and call light in reach. Will monitor for safety. Electronically signed by LUIS TENORIO RN on 8/31/2024 at 2:36 PM

## 2024-08-31 NOTE — PLAN OF CARE
Problem: Discharge Planning  Goal: Discharge to home or other facility with appropriate resources  8/31/2024 1309 by Courtney Mcarthur RN  Outcome: Progressing  Flowsheets (Taken 8/31/2024 0915)  Discharge to home or other facility with appropriate resources:   Identify barriers to discharge with patient and caregiver   Arrange for needed discharge resources and transportation as appropriate   Identify discharge learning needs (meds, wound care, etc)   Refer to discharge planning if patient needs post-hospital services based on physician order or complex needs related to functional status, cognitive ability or social support system     Problem: Safety - Adult  Goal: Free from fall injury  8/31/2024 1309 by Courtney Mcarthur RN  Outcome: Progressing  Flowsheets (Taken 8/31/2024 0825)  Free From Fall Injury: Instruct family/caregiver on patient safety     Problem: Pain  Goal: Verbalizes/displays adequate comfort level or baseline comfort level  8/31/2024 1309 by Courtney Mcarthur RN  Outcome: Progressing  Flowsheets (Taken 8/31/2024 0915)  Verbalizes/displays adequate comfort level or baseline comfort level:   Encourage patient to monitor pain and request assistance   Assess pain using appropriate pain scale   Administer analgesics based on type and severity of pain and evaluate response   Implement non-pharmacological measures as appropriate and evaluate response     Problem: ABCDS Injury Assessment  Goal: Absence of physical injury  8/31/2024 1309 by Courtney Mcarthur RN  Outcome: Progressing  Flowsheets (Taken 8/31/2024 0825)  Absence of Physical Injury: Implement safety measures based on patient assessment     Problem: Skin/Tissue Integrity  Goal: Absence of new skin breakdown  Description: 1.  Monitor for areas of redness and/or skin breakdown  2.  Assess vascular access sites hourly  3.  Every 4-6 hours minimum:  Change oxygen saturation probe site  4.  Every 4-6 hours:  If on nasal continuous positive airway

## 2024-08-31 NOTE — PROGRESS NOTES
Patient admitted to rehab after elective total knee replacement.  A/Ox4, pleasant and cooperative with care. Transfers with walker x 1, gait-belt, and CGA x 1, tolerating fairly well. Mobility restrictions: WBAT. On regular diet, tolerating well. Medications taken whole with thin liquids without difficulty of swallowing. On knee high GRAHAM hose for DVT prophylaxis.  Skin: Surgical incision to left knee without s/s of infection. Ice machine in use to manage pain. Patient denied pain or discomfort Oxygen: RA. LDA: None. Has been continent of bowel and bladder. LBM 8/29. Chair/bed alarms in use and call light in reach. Will continue to monitor.

## 2024-09-01 VITALS
BODY MASS INDEX: 30.36 KG/M2 | DIASTOLIC BLOOD PRESSURE: 91 MMHG | HEIGHT: 76 IN | TEMPERATURE: 97.4 F | RESPIRATION RATE: 17 BRPM | HEART RATE: 90 BPM | WEIGHT: 249.34 LBS | OXYGEN SATURATION: 96 % | SYSTOLIC BLOOD PRESSURE: 152 MMHG

## 2024-09-01 PROCEDURE — 2580000003 HC RX 258: Performed by: PHYSICAL MEDICINE & REHABILITATION

## 2024-09-01 PROCEDURE — 1280000000 HC REHAB R&B

## 2024-09-01 PROCEDURE — 94760 N-INVAS EAR/PLS OXIMETRY 1: CPT

## 2024-09-01 PROCEDURE — 6370000000 HC RX 637 (ALT 250 FOR IP): Performed by: PHYSICAL MEDICINE & REHABILITATION

## 2024-09-01 RX ORDER — ACETAMINOPHEN 500 MG
1000 TABLET ORAL EVERY 8 HOURS SCHEDULED
Status: DISPENSED | OUTPATIENT
Start: 2024-09-01

## 2024-09-01 RX ADMIN — SODIUM CHLORIDE, PRESERVATIVE FREE 10 ML: 5 INJECTION INTRAVENOUS at 08:45

## 2024-09-01 RX ADMIN — LORAZEPAM 0.5 MG: 0.5 TABLET ORAL at 20:11

## 2024-09-01 RX ADMIN — SACUBITRIL AND VALSARTAN 1 TABLET: 49; 51 TABLET, FILM COATED ORAL at 08:41

## 2024-09-01 RX ADMIN — ASPIRIN 81 MG: 81 TABLET, COATED ORAL at 20:11

## 2024-09-01 RX ADMIN — ACETAMINOPHEN 1000 MG: 500 TABLET ORAL at 15:55

## 2024-09-01 RX ADMIN — BUSPIRONE HYDROCHLORIDE 15 MG: 5 TABLET ORAL at 20:11

## 2024-09-01 RX ADMIN — LORAZEPAM 0.5 MG: 0.5 TABLET ORAL at 08:41

## 2024-09-01 RX ADMIN — BUPROPION HYDROCHLORIDE 150 MG: 150 TABLET, FILM COATED, EXTENDED RELEASE ORAL at 08:41

## 2024-09-01 RX ADMIN — BUPROPION HYDROCHLORIDE 150 MG: 150 TABLET, FILM COATED, EXTENDED RELEASE ORAL at 20:10

## 2024-09-01 RX ADMIN — ATORVASTATIN CALCIUM 10 MG: 10 TABLET, FILM COATED ORAL at 20:11

## 2024-09-01 RX ADMIN — BUSPIRONE HYDROCHLORIDE 15 MG: 5 TABLET ORAL at 08:41

## 2024-09-01 RX ADMIN — EMPAGLIFLOZIN 10 MG: 10 TABLET, FILM COATED ORAL at 08:41

## 2024-09-01 RX ADMIN — ACETAMINOPHEN 325MG 650 MG: 325 TABLET ORAL at 09:54

## 2024-09-01 RX ADMIN — SACUBITRIL AND VALSARTAN 1 TABLET: 49; 51 TABLET, FILM COATED ORAL at 20:09

## 2024-09-01 RX ADMIN — ACETAMINOPHEN 1000 MG: 500 TABLET ORAL at 20:10

## 2024-09-01 RX ADMIN — SODIUM CHLORIDE, PRESERVATIVE FREE 10 ML: 5 INJECTION INTRAVENOUS at 20:16

## 2024-09-01 RX ADMIN — LEVOTHYROXINE SODIUM 125 MCG: 0.12 TABLET ORAL at 07:03

## 2024-09-01 RX ADMIN — CARVEDILOL 12.5 MG: 12.5 TABLET, FILM COATED ORAL at 16:54

## 2024-09-01 RX ADMIN — SENNOSIDES AND DOCUSATE SODIUM 1 TABLET: 50; 8.6 TABLET ORAL at 20:11

## 2024-09-01 RX ADMIN — DULOXETINE HYDROCHLORIDE 60 MG: 60 CAPSULE, DELAYED RELEASE ORAL at 08:41

## 2024-09-01 RX ADMIN — FLUTICASONE PROPIONATE 1 SPRAY: 50 SPRAY, METERED NASAL at 08:42

## 2024-09-01 RX ADMIN — PANTOPRAZOLE SODIUM 40 MG: 40 TABLET, DELAYED RELEASE ORAL at 07:03

## 2024-09-01 RX ADMIN — ASPIRIN 81 MG: 81 TABLET, COATED ORAL at 08:41

## 2024-09-01 RX ADMIN — ACETAMINOPHEN 325MG 650 MG: 325 TABLET ORAL at 03:40

## 2024-09-01 RX ADMIN — CARVEDILOL 12.5 MG: 12.5 TABLET, FILM COATED ORAL at 08:41

## 2024-09-01 ASSESSMENT — PAIN DESCRIPTION - DESCRIPTORS
DESCRIPTORS: STABBING
DESCRIPTORS: ACHING
DESCRIPTORS: ACHING

## 2024-09-01 ASSESSMENT — PAIN DESCRIPTION - ORIENTATION
ORIENTATION: LEFT

## 2024-09-01 ASSESSMENT — PAIN - FUNCTIONAL ASSESSMENT
PAIN_FUNCTIONAL_ASSESSMENT: ACTIVITIES ARE NOT PREVENTED

## 2024-09-01 ASSESSMENT — PAIN DESCRIPTION - PAIN TYPE
TYPE: ACUTE PAIN
TYPE: ACUTE PAIN

## 2024-09-01 ASSESSMENT — PAIN SCALES - GENERAL
PAINLEVEL_OUTOF10: 5
PAINLEVEL_OUTOF10: 3
PAINLEVEL_OUTOF10: 0
PAINLEVEL_OUTOF10: 5
PAINLEVEL_OUTOF10: 0

## 2024-09-01 ASSESSMENT — PAIN DESCRIPTION - ONSET
ONSET: ON-GOING
ONSET: ON-GOING

## 2024-09-01 ASSESSMENT — PAIN DESCRIPTION - FREQUENCY
FREQUENCY: INTERMITTENT
FREQUENCY: INTERMITTENT

## 2024-09-01 ASSESSMENT — PAIN DESCRIPTION - LOCATION
LOCATION: KNEE

## 2024-09-01 NOTE — PLAN OF CARE
Problem: Safety - Adult  Goal: Free from fall injury  Outcome: Progressing     Problem: Pain  Goal: Verbalizes/displays adequate comfort level or baseline comfort level  Outcome: Progressing     Problem: ABCDS Injury Assessment  Goal: Absence of physical injury  Outcome: Progressing  Flowsheets (Taken 9/1/2024 4765)  Absence of Physical Injury: Implement safety measures based on patient assessment     Problem: Skin/Tissue Integrity  Goal: Absence of new skin breakdown  Description: 1.  Monitor for areas of redness and/or skin breakdown  2.  Assess vascular access sites hourly  3.  Every 4-6 hours minimum:  Change oxygen saturation probe site  4.  Every 4-6 hours:  If on nasal continuous positive airway pressure, respiratory therapy assess nares and determine need for appliance change or resting period.  Outcome: Progressing

## 2024-09-01 NOTE — PROGRESS NOTES
Patient admitted to rehab with left total knee replacement.  A/Ox4. Transfers with walker x1. Mobility restrictions: WBAT. On regular diet, tolerating well. Medications taken whole with thins. On aspirin, lenore hose for DVT prophylaxis.  Skin: surgical incision left knee. Oxygen: RA. LDA: PIV right forearm. Has been continent of bowel and continent of bladder. LBM 8/31/24. Chair/bed alarms in use and call light in reach. Will monitor for safety. Electronically signed by LUIS TENORIO RN on 9/1/2024 at 10:48 AM

## 2024-09-01 NOTE — PLAN OF CARE
Problem: Discharge Planning  Goal: Discharge to home or other facility with appropriate resources  9/1/2024 1028 by Courtney Mcarthur, RN  Outcome: Progressing  Flowsheets (Taken 9/1/2024 0836)  Discharge to home or other facility with appropriate resources:   Identify barriers to discharge with patient and caregiver   Arrange for needed discharge resources and transportation as appropriate   Identify discharge learning needs (meds, wound care, etc)   Refer to discharge planning if patient needs post-hospital services based on physician order or complex needs related to functional status, cognitive ability or social support system     Problem: Safety - Adult  Goal: Free from fall injury  9/1/2024 1028 by Courtney Mcarthur, RN  Outcome: Progressing  Flowsheets (Taken 9/1/2024 0722)  Free From Fall Injury: Instruct family/caregiver on patient safety     Problem: Pain  Goal: Verbalizes/displays adequate comfort level or baseline comfort level  9/1/2024 1028 by Courtney Mcarthur, RN  Outcome: Progressing  Flowsheets  Taken 9/1/2024 0954  Verbalizes/displays adequate comfort level or baseline comfort level:   Encourage patient to monitor pain and request assistance   Assess pain using appropriate pain scale   Administer analgesics based on type and severity of pain and evaluate response   Implement non-pharmacological measures as appropriate and evaluate response

## 2024-09-02 VITALS
HEART RATE: 98 BPM | HEIGHT: 76 IN | WEIGHT: 248.46 LBS | SYSTOLIC BLOOD PRESSURE: 149 MMHG | TEMPERATURE: 97.6 F | BODY MASS INDEX: 30.26 KG/M2 | DIASTOLIC BLOOD PRESSURE: 91 MMHG | OXYGEN SATURATION: 96 % | RESPIRATION RATE: 16 BRPM

## 2024-09-02 LAB
ANION GAP SERPL CALCULATED.3IONS-SCNC: 8 MMOL/L (ref 3–16)
BASOPHILS # BLD: 0.1 K/UL (ref 0–0.2)
BASOPHILS NFR BLD: 0.5 %
BUN SERPL-MCNC: 15 MG/DL (ref 7–20)
CALCIUM SERPL-MCNC: 9 MG/DL (ref 8.3–10.6)
CHLORIDE SERPL-SCNC: 100 MMOL/L (ref 99–110)
CO2 SERPL-SCNC: 27 MMOL/L (ref 21–32)
CREAT SERPL-MCNC: 1 MG/DL (ref 0.8–1.3)
DEPRECATED RDW RBC AUTO: 14.6 % (ref 12.4–15.4)
EOSINOPHIL # BLD: 0.2 K/UL (ref 0–0.6)
EOSINOPHIL NFR BLD: 2.2 %
GFR SERPLBLD CREATININE-BSD FMLA CKD-EPI: 77 ML/MIN/{1.73_M2}
GLUCOSE SERPL-MCNC: 107 MG/DL (ref 70–99)
HCT VFR BLD AUTO: 38.5 % (ref 40.5–52.5)
HGB BLD-MCNC: 13.2 G/DL (ref 13.5–17.5)
LYMPHOCYTES # BLD: 1.5 K/UL (ref 1–5.1)
LYMPHOCYTES NFR BLD: 15.3 %
MCH RBC QN AUTO: 33.1 PG (ref 26–34)
MCHC RBC AUTO-ENTMCNC: 34.4 G/DL (ref 31–36)
MCV RBC AUTO: 96.3 FL (ref 80–100)
MONOCYTES # BLD: 1.1 K/UL (ref 0–1.3)
MONOCYTES NFR BLD: 11.3 %
NEUTROPHILS # BLD: 6.8 K/UL (ref 1.7–7.7)
NEUTROPHILS NFR BLD: 70.7 %
PLATELET # BLD AUTO: 345 K/UL (ref 135–450)
PMV BLD AUTO: 7.7 FL (ref 5–10.5)
POTASSIUM SERPL-SCNC: 3.9 MMOL/L (ref 3.5–5.1)
RBC # BLD AUTO: 4 M/UL (ref 4.2–5.9)
SODIUM SERPL-SCNC: 135 MMOL/L (ref 136–145)
WBC # BLD AUTO: 9.7 K/UL (ref 4–11)

## 2024-09-02 PROCEDURE — 94760 N-INVAS EAR/PLS OXIMETRY 1: CPT

## 2024-09-02 PROCEDURE — 85025 COMPLETE CBC W/AUTO DIFF WBC: CPT

## 2024-09-02 PROCEDURE — 80048 BASIC METABOLIC PNL TOTAL CA: CPT

## 2024-09-02 PROCEDURE — 2580000003 HC RX 258: Performed by: PHYSICAL MEDICINE & REHABILITATION

## 2024-09-02 PROCEDURE — 36415 COLL VENOUS BLD VENIPUNCTURE: CPT

## 2024-09-02 PROCEDURE — 1280000000 HC REHAB R&B

## 2024-09-02 PROCEDURE — 6370000000 HC RX 637 (ALT 250 FOR IP): Performed by: PHYSICAL MEDICINE & REHABILITATION

## 2024-09-02 RX ADMIN — CARVEDILOL 12.5 MG: 12.5 TABLET, FILM COATED ORAL at 08:08

## 2024-09-02 RX ADMIN — DULOXETINE HYDROCHLORIDE 60 MG: 60 CAPSULE, DELAYED RELEASE ORAL at 08:08

## 2024-09-02 RX ADMIN — BUPROPION HYDROCHLORIDE 150 MG: 150 TABLET, FILM COATED, EXTENDED RELEASE ORAL at 08:08

## 2024-09-02 RX ADMIN — SENNOSIDES AND DOCUSATE SODIUM 1 TABLET: 50; 8.6 TABLET ORAL at 08:08

## 2024-09-02 RX ADMIN — LORAZEPAM 0.5 MG: 0.5 TABLET ORAL at 20:52

## 2024-09-02 RX ADMIN — PANTOPRAZOLE SODIUM 40 MG: 40 TABLET, DELAYED RELEASE ORAL at 05:41

## 2024-09-02 RX ADMIN — ASPIRIN 81 MG: 81 TABLET, COATED ORAL at 20:51

## 2024-09-02 RX ADMIN — LEVOTHYROXINE SODIUM 125 MCG: 0.12 TABLET ORAL at 05:41

## 2024-09-02 RX ADMIN — ATORVASTATIN CALCIUM 10 MG: 10 TABLET, FILM COATED ORAL at 20:52

## 2024-09-02 RX ADMIN — EMPAGLIFLOZIN 10 MG: 10 TABLET, FILM COATED ORAL at 08:08

## 2024-09-02 RX ADMIN — SACUBITRIL AND VALSARTAN 1 TABLET: 49; 51 TABLET, FILM COATED ORAL at 08:08

## 2024-09-02 RX ADMIN — ASPIRIN 81 MG: 81 TABLET, COATED ORAL at 08:08

## 2024-09-02 RX ADMIN — ACETAMINOPHEN 1000 MG: 500 TABLET ORAL at 20:52

## 2024-09-02 RX ADMIN — LORAZEPAM 0.5 MG: 0.5 TABLET ORAL at 08:08

## 2024-09-02 RX ADMIN — ACETAMINOPHEN 1000 MG: 500 TABLET ORAL at 14:11

## 2024-09-02 RX ADMIN — SODIUM CHLORIDE, PRESERVATIVE FREE 10 ML: 5 INJECTION INTRAVENOUS at 08:12

## 2024-09-02 RX ADMIN — BUSPIRONE HYDROCHLORIDE 15 MG: 5 TABLET ORAL at 20:51

## 2024-09-02 RX ADMIN — BUSPIRONE HYDROCHLORIDE 15 MG: 5 TABLET ORAL at 08:08

## 2024-09-02 RX ADMIN — SACUBITRIL AND VALSARTAN 1 TABLET: 49; 51 TABLET, FILM COATED ORAL at 20:52

## 2024-09-02 RX ADMIN — BUPROPION HYDROCHLORIDE 150 MG: 150 TABLET, FILM COATED, EXTENDED RELEASE ORAL at 20:52

## 2024-09-02 RX ADMIN — ACETAMINOPHEN 1000 MG: 500 TABLET ORAL at 05:42

## 2024-09-02 RX ADMIN — CARVEDILOL 12.5 MG: 12.5 TABLET, FILM COATED ORAL at 16:21

## 2024-09-02 RX ADMIN — FLUTICASONE PROPIONATE 1 SPRAY: 50 SPRAY, METERED NASAL at 08:09

## 2024-09-02 ASSESSMENT — PAIN DESCRIPTION - DESCRIPTORS
DESCRIPTORS: STABBING
DESCRIPTORS: ACHING

## 2024-09-02 ASSESSMENT — PAIN DESCRIPTION - LOCATION
LOCATION: KNEE
LOCATION: LEG

## 2024-09-02 ASSESSMENT — PAIN - FUNCTIONAL ASSESSMENT: PAIN_FUNCTIONAL_ASSESSMENT: ACTIVITIES ARE NOT PREVENTED

## 2024-09-02 ASSESSMENT — PAIN DESCRIPTION - FREQUENCY: FREQUENCY: INTERMITTENT

## 2024-09-02 ASSESSMENT — PAIN SCALES - GENERAL
PAINLEVEL_OUTOF10: 3
PAINLEVEL_OUTOF10: 3
PAINLEVEL_OUTOF10: 0

## 2024-09-02 ASSESSMENT — PAIN DESCRIPTION - ORIENTATION
ORIENTATION: LEFT
ORIENTATION: LEFT

## 2024-09-02 ASSESSMENT — PAIN DESCRIPTION - ONSET: ONSET: ON-GOING

## 2024-09-02 ASSESSMENT — PAIN DESCRIPTION - PAIN TYPE: TYPE: ACUTE PAIN

## 2024-09-02 NOTE — PLAN OF CARE
Problem: Discharge Planning  Goal: Discharge to home or other facility with appropriate resources  9/2/2024 0907 by Courtney Mcarthur RN  Outcome: Progressing  Flowsheets (Taken 9/2/2024 0804)  Discharge to home or other facility with appropriate resources:   Identify barriers to discharge with patient and caregiver   Arrange for needed discharge resources and transportation as appropriate   Identify discharge learning needs (meds, wound care, etc)   Refer to discharge planning if patient needs post-hospital services based on physician order or complex needs related to functional status, cognitive ability or social support system     Problem: Safety - Adult  Goal: Free from fall injury  9/2/2024 0907 by Courtney Mcarthur RN  Outcome: Progressing  Flowsheets (Taken 9/2/2024 0727)  Free From Fall Injury: Instruct family/caregiver on patient safety     Problem: Pain  Goal: Verbalizes/displays adequate comfort level or baseline comfort level  9/2/2024 0907 by Courtney Mcarthur RN  Outcome: Progressing  Flowsheets (Taken 9/2/2024 0804)  Verbalizes/displays adequate comfort level or baseline comfort level:   Encourage patient to monitor pain and request assistance   Assess pain using appropriate pain scale   Administer analgesics based on type and severity of pain and evaluate response   Implement non-pharmacological measures as appropriate and evaluate response     Problem: ABCDS Injury Assessment  Goal: Absence of physical injury  9/2/2024 0907 by Courtney Mcarthur RN  Outcome: Progressing  Flowsheets (Taken 9/2/2024 0727)  Absence of Physical Injury: Implement safety measures based on patient assessment     Problem: Skin/Tissue Integrity  Goal: Absence of new skin breakdown  Description: 1.  Monitor for areas of redness and/or skin breakdown  2.  Assess vascular access sites hourly  3.  Every 4-6 hours minimum:  Change oxygen saturation probe site  4.  Every 4-6 hours:  If on nasal continuous positive airway pressure,

## 2024-09-02 NOTE — PROGRESS NOTES
Patient admitted to rehab with left total knee replacement.  A/Ox4. Transfers with walker x1. Mobility restrictions: WBAT, contact guard. On regular diet, tolerating well. Medications taken whole with thins. On aspirin, lenore hose for DVT prophylaxis.  Skin: surgical incision to left knee. Oxygen: RA. LDA: PIV right forearm. Has been continent of bowel and continent of bladder. LBM 8/31/24. Chair/bed alarms in use and call light in reach. Will monitor for safety. Electronically signed by LUIS TENORIO RN on 9/2/2024 at 11:49 AM

## 2024-09-02 NOTE — PROGRESS NOTES
Patient admitted to rehab with left total knee replacement. A/Ox4. Transfers with Walker x 1. Mobility restrictions: WBAT. On Regular diet, tolerating well. Medications taken whole with thin liquids. On ASA for DVT prophylaxis. Skin: surgical incision to left knee. Oxygen: RA (CPAP at HS). LDA: PIV RFA. Has been continent of bowel and bladder. LBM 8/31 Chair/bed alarms in use and call light in reach

## 2024-09-02 NOTE — PROGRESS NOTES
Bathing  Assistance Level: Supervision  Skilled Clinical Factors: OT covered IV site. Pt washed/dried UB by self including abd folds,  was ed on using LH sponge for back. He managed hose and water temp by self.  LE Bathing  Equipment Provided: Long-handled sponge  Assistance Level: Stand by assist  Skilled Clinical Factors: Pt washed/dried LB using LH sponge for feet and lower part of legs. He 1/2 stood to grab bars supervision and washed buttocks and selvin area in stance CGA w/ 1 UE on grab bar. Stood additional time for OT to dry buttocks  UE Dressing  Assistance Level: Set-up  Skilled Clinical Factors: Pt gathered clothing from closet using the RW.  LE Dressing  Equipment Provided: Reachers  Assistance Level: Contact guard assist  Skilled Clinical Factors: Used the reacher to petar left leg in holes of depends and pants.  Pt donned right foot without AE.  Pt stood with CGA to pull up depends and pants.  Putting On/Taking Off Footwear  Equipment Provided: Sock aid, Reachers  Assistance Level: Stand by assist  Skilled Clinical Factors: He used his reacher to doff both socks.  Pt placed the GRAHAM half on the sock aid as he had difficulty pulling the GRAHAM all the way on the sock aid.  This placed the GRAHAM past his right heel.  He then was able to lean down and pull the GRAHAM up the rest of the way.  He donned Left GRAHAM with out AE.  He donned his left  sock using sock aid with cues to pull the sock all the way on the sock aid.  Pt able to cross his left leg to petar left sock.  Toileting  Assistance Level: Moderate assistance  Skilled Clinical Factors: Pt reports he just had BM.    Speech therapy:    ADULT DIET; Regular        Body mass index is 30.35 kg/m².    Rehabilitation Diagnosis:   Orthopedic, 8.61, Unilateral Knee Replacement        Assessment and Plan:     Impairments: Decreased left knee ROM, decreased balance      Arthritis left knee   -s/p Left TKA (8/20 with Dr. Deng)  -wound care per Ortho  -PT/OT    Altered

## 2024-09-03 PROCEDURE — 97110 THERAPEUTIC EXERCISES: CPT

## 2024-09-03 PROCEDURE — 2580000003 HC RX 258: Performed by: PHYSICAL MEDICINE & REHABILITATION

## 2024-09-03 PROCEDURE — 97535 SELF CARE MNGMENT TRAINING: CPT

## 2024-09-03 PROCEDURE — 94660 CPAP INITIATION&MGMT: CPT

## 2024-09-03 PROCEDURE — 97530 THERAPEUTIC ACTIVITIES: CPT

## 2024-09-03 PROCEDURE — 97116 GAIT TRAINING THERAPY: CPT

## 2024-09-03 PROCEDURE — 94760 N-INVAS EAR/PLS OXIMETRY 1: CPT

## 2024-09-03 PROCEDURE — 1280000000 HC REHAB R&B

## 2024-09-03 PROCEDURE — 6370000000 HC RX 637 (ALT 250 FOR IP): Performed by: PHYSICAL MEDICINE & REHABILITATION

## 2024-09-03 ASSESSMENT — PAIN DESCRIPTION - ONSET: ONSET: ON-GOING

## 2024-09-03 ASSESSMENT — PAIN SCALES - GENERAL
PAINLEVEL_OUTOF10: 4
PAINLEVEL_OUTOF10: 3
PAINLEVEL_OUTOF10: 0

## 2024-09-03 ASSESSMENT — PAIN DESCRIPTION - LOCATION: LOCATION: KNEE

## 2024-09-03 ASSESSMENT — PAIN DESCRIPTION - FREQUENCY: FREQUENCY: INTERMITTENT

## 2024-09-03 ASSESSMENT — PAIN DESCRIPTION - DESCRIPTORS: DESCRIPTORS: STABBING

## 2024-09-03 ASSESSMENT — PAIN DESCRIPTION - PAIN TYPE: TYPE: ACUTE PAIN

## 2024-09-03 ASSESSMENT — PAIN - FUNCTIONAL ASSESSMENT: PAIN_FUNCTIONAL_ASSESSMENT: ACTIVITIES ARE NOT PREVENTED

## 2024-09-03 ASSESSMENT — PAIN DESCRIPTION - ORIENTATION: ORIENTATION: LEFT

## 2024-09-03 NOTE — PLAN OF CARE
Problem: Discharge Planning  Goal: Discharge to home or other facility with appropriate resources  9/3/2024 1021 by Courtney Mcarthur RN  Outcome: Progressing  Flowsheets (Taken 9/3/2024 0736)  Discharge to home or other facility with appropriate resources:   Identify barriers to discharge with patient and caregiver   Arrange for needed discharge resources and transportation as appropriate   Identify discharge learning needs (meds, wound care, etc)   Refer to discharge planning if patient needs post-hospital services based on physician order or complex needs related to functional status, cognitive ability or social support system     Problem: Safety - Adult  Goal: Free from fall injury  9/3/2024 1021 by Courtney Mcarthur RN  Outcome: Progressing  Flowsheets (Taken 9/3/2024 0738)  Free From Fall Injury: Instruct family/caregiver on patient safety     Problem: Pain  Goal: Verbalizes/displays adequate comfort level or baseline comfort level  9/3/2024 1021 by Courtney Mcarthur RN  Outcome: Progressing  Flowsheets (Taken 9/3/2024 0736)  Verbalizes/displays adequate comfort level or baseline comfort level:   Encourage patient to monitor pain and request assistance   Assess pain using appropriate pain scale     Problem: ABCDS Injury Assessment  Goal: Absence of physical injury  9/3/2024 1021 by Courtney Mcarthur RN  Outcome: Progressing  Flowsheets (Taken 9/3/2024 0738)  Absence of Physical Injury: Implement safety measures based on patient assessment     Problem: Skin/Tissue Integrity  Goal: Absence of new skin breakdown  Description: 1.  Monitor for areas of redness and/or skin breakdown  2.  Assess vascular access sites hourly  3.  Every 4-6 hours minimum:  Change oxygen saturation probe site  4.  Every 4-6 hours:  If on nasal continuous positive airway pressure, respiratory therapy assess nares and determine need for appliance change or resting period.  9/3/2024 1021 by Courtney Mcarthur, RN  Outcome: Progressing

## 2024-09-03 NOTE — PROGRESS NOTES
Patient admitted to rehab with left total knee replacement.  A/Ox4. Transfers with walker x1. Mobility restrictions: WBAT, contact guard. On regular diet, tolerating well. Medications taken whole with thins. On aspirin, lenore hose for DVT prophylaxis.  Skin: surgical incision left knee. Oxygen: RA, CPAP @ HS. LDA: PIV right forearm. Has been continent of bowel and continent of bladder. LBM 8/31/24. Chair/bed alarms in use and call light in reach. Will monitor for safety. Electronically signed by LUIS TENORIO RN on 9/3/2024 at 10:24 AM

## 2024-09-03 NOTE — PROGRESS NOTES
Patient admitted to rehab after having elective total knee replacement.  A/Ox4, pleasant and cooperative with care. Transfers with walker x 1, gait-belt, and CGA x 1, tolerating well. Mobility restrictions: WBAT. On regular diet, tolerating well. Medications taken whole with thin liquids without difficulty of swallowing. On knee high GRAHAM hose for DVT prophylaxis.  Skin: Surgical incision to left knee without s/s of infection. Incision is well-approximated with prineo dressing intact. Ice machine in use to manage pain as well as scheduled Tylenol.  Oxygen: RA. LDA: PIV to right forearm, flushes easily. Has been continent of bowel and bladder. LBM 8/31. Chair/bed alarms in use and call light in reach. Will continue to monitor.

## 2024-09-03 NOTE — PROGRESS NOTES
Physical Therapy  Facility/Department: [address] [facility]  Rehabilitation Physical Therapy Treatment Note    NAME: Mark Josue  : 1945 (79 y.o.)  MRN: [id number]  CODE STATUS: Full Code    Date of Service: 9/3/24  Restrictions:  Restrictions/Precautions: Fall Risk, Weight Bearing  Lower Extremity Weight Bearing Restrictions  Left Lower Extremity Weight Bearing: Weight Bearing As Tolerated     SUBJECTIVE  Subjective  Subjective: Pt friendly anf agreeable to therapy  Pain: Pt reports pain throughout session a between -9/10  OBJECTIVE  Orientation  Overall Orientation Status: Within Functional Limits    Functional Mobility  Bed Mobility  Overall Assistance Level: Independent  Roll Left  Assistance Level: Independent  Roll Right  Assistance Level: Independent  Sit to Supine  Assistance Level: Independent  Supine to Sit  Assistance Level: Independent  Scooting  Assistance Level: Independent  Balance  Sitting Balance: Independent  Standing Balance: Supervision  Sit to Stand  Assistance Level: Modified independent  Skilled Clinical Factors: with rolling walker  Stand to Sit  Assistance Level: Modified independent    Environmental Mobility  Ambulation  Surface: Level surface  Device: Rolling walker  Distance: Pt ambulates 100' x 1 c supervision reporting fatigue secondary to this mornings difficult session  Activity: Within Unit  Assistance Level: Supervision  Gait Deviations: Slow karla;Decreased weight shift left;Decreased step length right;Decreased heel strike left  Stairs  Stair Height: 6''  Number of Stairs: 8  Additional Factors: Non-reciprocal going up;Non-reciprocal going down  Assistance Level: Contact guard assist  Skilled Clinical Factors: Pt performs 4 steps x 2 ascending c R leg first, descending c L foot first; non-reciprocal gait pattern performed proficiently    PT Exercises  Exercise Treatment: Supine L SLR 3x5, quad set c rolled pillow case under knee x5 c 2 sec hold then x5 c 5 sec hold; 30

## 2024-09-03 NOTE — DISCHARGE INSTR - COC
Continuity of Care Form    Patient Name: Mark Josue   :  1945  MRN:  [id number]    Admit date:  2024  Discharge date:  24    Code Status Order: Full Code   Advance Directives:   Advance Care Flowsheet Documentation             Admitting Physician:  Nuris Salas MD  PCP: Darius Sow MD    Discharging Nurse: ISA JENKINS  Discharging Hospital Unit/Room#: U7A-9519/3261-01  Discharging Unit Phone Number: [phone]    Emergency Contact:   Extended Emergency Contact Information  Primary Emergency Contact: Aria Pearl  Address: [address] [place] [address] [place] [address] [facility]  Home Phone: [phone]  Relation: Spouse  Secondary Emergency Contact: Sj Josue, [place]  Home Phone: [phone]  Relation: Child    Past Surgical History:  Past Surgical History:   Procedure Laterality Date    CARDIAC SURGERY      stent    CATARACT EXTRACTION Bilateral     CHOLECYSTECTOMY  18 years ago    COLONOSCOPY      KNEE ARTHROSCOPY Right     SHOULDER ARTHROSCOPY Left 15 years ago    TIBIA FRACTURE SURGERY Left 2015    ORIF left tibia nonunion fracture    TOTAL KNEE ARTHROPLASTY Left 2024    LEFT TOTAL KNEE REPLACEMENT WITH  ROBOTIC ASSISTANCE performed by Rory Deng MD at [facility] OR       Immunization History:   Immunization History   Administered Date(s) Administered    COVID-19, PFIZER Bivalent, DO NOT Dilute, (age 12y+), IM, 30 mcg/0.3 mL 2022    COVID-19, PFIZER GRAY top, DO NOT Dilute, (age 12 y+), IM, 30 mcg/0.3 mL 2022    COVID-19, PFIZER PURPLE top, DILUTE for use, (age 12 y+), 30mcg/0.3mL 2021, 2021, 2021    COVID-19, PFIZER, ( formula), (age 12y+), IM, 30mcg/0.3mL 2023       Active Problems:  Patient Active Problem List   Diagnosis Code    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

## 2024-09-03 NOTE — PLAN OF CARE
Problem: Discharge Planning  Goal: Discharge to home or other facility with appropriate resources  Outcome: Progressing  Flowsheets (Taken 9/2/2024 2045)  Discharge to home or other facility with appropriate resources: Identify barriers to discharge with patient and caregiver     Problem: Safety - Adult  Goal: Free from fall injury  Outcome: Progressing     Problem: Pain  Goal: Verbalizes/displays adequate comfort level or baseline comfort level  Outcome: Progressing     Problem: ABCDS Injury Assessment  Goal: Absence of physical injury  Outcome: Progressing     Problem: Skin/Tissue Integrity  Goal: Absence of new skin breakdown  Description: 1.  Monitor for areas of redness and/or skin breakdown  2.  Assess vascular access sites hourly  3.  Every 4-6 hours minimum:  Change oxygen saturation probe site  4.  Every 4-6 hours:  If on nasal continuous positive airway pressure, respiratory therapy assess nares and determine need for appliance change or resting period.  Outcome: Progressing     Problem: Nutrition Deficit:  Goal: Optimize nutritional status  Outcome: Progressing

## 2024-09-03 NOTE — PROGRESS NOTES
Physical Therapy  [address]  Rehabilitation Physical Therapy Treatment Note    NAME: Mark Josue  : 1945 (79 y.o.)  MRN: [id number]  CODE STATUS: Full Code    Date of Service: 9/3/24       Restrictions:  Restrictions/Precautions: Fall Risk, Weight Bearing  Lower Extremity Weight Bearing Restrictions  Left Lower Extremity Weight Bearing: Weight Bearing As Tolerated     SUBJECTIVE  Subjective  Subjective: Pt reports 5/10 L knee pain this morning.  Agreeable to activity.        Post Treatment Pain Screening         OBJECTIVE  Orientation  Overall Orientation Status: Within Functional Limits    Functional Mobility  Bed Mobility  Overall Assistance Level: Independent  Roll Left  Assistance Level: Independent  Roll Right  Assistance Level: Independent  Sit to Supine  Assistance Level: Independent  Supine to Sit  Assistance Level: Independent  Scooting  Assistance Level: Independent  Balance  Sitting Balance: Independent  Standing Balance: Supervision      Environmental Mobility  Ambulation  Surface: Level surface  Device: Rolling walker  Distance: 180', 250'  Activity: Within Unit  Assistance Level: Supervision  Gait Deviations: Slow karla;Decreased weight shift left;Decreased step length right  Skilled Clinical Factors: Tends to push walker slightly too far in front             PT Exercises  Exercise Treatment: Supine: LLE Heel slide x 20.  L knee contract relax stretch x 10.  Knee Flex AROM = 95.  PROM = 100.  LLE quad set 20 x 5 s. hold.  Ext AROM = 3.  PROM = 0.  LLE LAQ 3 x 10, 2.5# ankle weight.      ASSESSMENT/PROGRESS TOWARDS GOALS       Assessment  Assessment: Pt tolerated L knee strength/ROM well, demonstrating mildly improved ROM.  Still reports moderate pain in L knee.  Walking is much improved compared to initial evaluation.  Recommend continued therapy to further improve strength, ROM, balanc, endurance, and independence ambulating.  Activity Tolerance: Patient

## 2024-09-03 NOTE — PROGRESS NOTES
Occupational Therapy  Facility/Department: [address] [facility]  Rehabilitation Occupational Therapy Daily Treatment Note    Date: 9/3/24  Patient Name: Mark Josue       Room: X1V-8893/3261-01  MRN: [id number]  Account: [id number]   : 1945  (79 y.o.) Gender: male                    Past Medical History:  has a past medical history of Anxiety, CAD (coronary artery disease), Depression, GERD (gastroesophageal reflux disease), Hyperlipidemia, Hypertension, TAM on CPAP, Thyroid disease, and Wears glasses.  Past Surgical History:   has a past surgical history that includes Knee arthroscopy (Right); Shoulder arthroscopy (Left, 15 years ago); Cardiac surgery (); Cholecystectomy (18 years ago); Tibia fracture surgery (Left, 2015); Cataract extraction (Bilateral); Colonoscopy (); and Total knee arthroplasty (Left, 2024).    Restrictions  Restrictions/Precautions: Fall Risk, Weight Bearing  Left Lower Extremity Weight Bearing: Weight Bearing As Tolerated    Subjective  Subjective: Pt met in room, supine in bed. Pt reporting pain in L knee rating 4/10.  Restrictions/Precautions: Fall Risk;Weight Bearing             Objective     Orientation  Overall Orientation Status: Within Functional Limits         ADL  Grooming/Oral Hygiene  Assistance Level: Supervision  Skilled Clinical Factors: Pt stood at the sink to complete oral care, comb his hair.  Upper Extremity Bathing  Assistance Level: Supervision  Skilled Clinical Factors: OT covered IV site. Pt washed/dried UB by self including abd folds,  was ed on using LH sponge for back. He managed hose and water temp by self.  Lower Extremity Bathing  Assistance Level: Stand by assist  Skilled Clinical Factors: OT covered L knee incision site. Pt washed LB with LH sponge. He stood to grab bars supervision and washed buttocks and selvin area in stance SBA, used BUE to wash.  Upper Extremity Dressing  Assistance Level: Set-up  Skilled Clinical Factors: Pt

## 2024-09-04 ENCOUNTER — APPOINTMENT (OUTPATIENT)
Dept: GENERAL RADIOLOGY | Age: 79
DRG: 560 | End: 2024-09-04
Attending: PHYSICAL MEDICINE & REHABILITATION
Payer: MEDICARE

## 2024-09-04 ENCOUNTER — TELEPHONE (OUTPATIENT)
Dept: ORTHOPEDICS UNIT | Age: 79
End: 2024-09-04

## 2024-09-04 PROCEDURE — 97110 THERAPEUTIC EXERCISES: CPT

## 2024-09-04 PROCEDURE — 97535 SELF CARE MNGMENT TRAINING: CPT

## 2024-09-04 PROCEDURE — 2580000003 HC RX 258: Performed by: PHYSICAL MEDICINE & REHABILITATION

## 2024-09-04 PROCEDURE — 97116 GAIT TRAINING THERAPY: CPT

## 2024-09-04 PROCEDURE — 73560 X-RAY EXAM OF KNEE 1 OR 2: CPT

## 2024-09-04 PROCEDURE — 1280000000 HC REHAB R&B

## 2024-09-04 PROCEDURE — 97530 THERAPEUTIC ACTIVITIES: CPT

## 2024-09-04 PROCEDURE — 6370000000 HC RX 637 (ALT 250 FOR IP): Performed by: PHYSICAL MEDICINE & REHABILITATION

## 2024-09-04 PROCEDURE — 94760 N-INVAS EAR/PLS OXIMETRY 1: CPT

## 2024-09-04 PROCEDURE — 94660 CPAP INITIATION&MGMT: CPT

## 2024-09-04 ASSESSMENT — PAIN SCALES - GENERAL
PAINLEVEL_OUTOF10: 4
PAINLEVEL_OUTOF10: 0
PAINLEVEL_OUTOF10: 3

## 2024-09-04 ASSESSMENT — PAIN - FUNCTIONAL ASSESSMENT
PAIN_FUNCTIONAL_ASSESSMENT: PREVENTS OR INTERFERES WITH MANY ACTIVE NOT PASSIVE ACTIVITIES
PAIN_FUNCTIONAL_ASSESSMENT: ACTIVITIES ARE NOT PREVENTED

## 2024-09-04 ASSESSMENT — PAIN SCALES - WONG BAKER
WONGBAKER_NUMERICALRESPONSE: NO HURT
WONGBAKER_NUMERICALRESPONSE: NO HURT

## 2024-09-04 ASSESSMENT — PAIN DESCRIPTION - DESCRIPTORS
DESCRIPTORS: ACHING
DESCRIPTORS: ACHING

## 2024-09-04 ASSESSMENT — PAIN DESCRIPTION - LOCATION
LOCATION: HIP
LOCATION: KNEE

## 2024-09-04 ASSESSMENT — PAIN DESCRIPTION - ORIENTATION
ORIENTATION: LEFT
ORIENTATION: LEFT

## 2024-09-04 NOTE — PROGRESS NOTES
Perfect serve sent to Tiffanie Paz NP re: pt d/c tomorrow and has appt sched w/ Dr Deng tomorrow. Pt & family requesting he be seen here today.

## 2024-09-04 NOTE — PLAN OF CARE
Monitor for areas of redness and/or skin breakdown  2.  Assess vascular access sites hourly  3.  Every 4-6 hours minimum:  Change oxygen saturation probe site  4.  Every 4-6 hours:  If on nasal continuous positive airway pressure, respiratory therapy assess nares and determine need for appliance change or resting period.  9/3/2024 2320 by Amanda Aldana, RN  Outcome: Progressing  9/3/2024 1021 by Courtney Mcarthur, RN  Outcome: Progressing     Problem: Nutrition Deficit:  Goal: Optimize nutritional status  9/3/2024 2320 by Amanda Aldana, RN  Outcome: Progressing  9/3/2024 1021 by Courtney Mcarthur, RN  Outcome: Progressing

## 2024-09-04 NOTE — PROGRESS NOTES
Physical Therapy  Facility/Department: [address] [facility]  Rehabilitation Physical Therapy Treatment Note/Discharge Note    NAME: Mark Josue  : 1945 (79 y.o.)  MRN: [id number]  CODE STATUS: Full Code    Date of Service: 24    Restrictions:  Restrictions/Precautions: Fall Risk, Weight Bearing  Lower Extremity Weight Bearing Restrictions  Left Lower Extremity Weight Bearing: Weight Bearing As Tolerated     SUBJECTIVE       OBJECTIVE  Cognition  Overall Cognitive Status: WFL  Orientation  Overall Orientation Status: Within Functional Limits    Functional Mobility  Bed Mobility  Overall Assistance Level: Independent  Roll Left  Assistance Level: Independent  Roll Right  Assistance Level: Independent  Sit to Supine  Assistance Level: Independent  Supine to Sit  Assistance Level: Independent  Scooting  Assistance Level: Independent  Comment: all bed mobility tasks performed with HOB flat and no bed rail  Balance  Sitting Balance: Independent  Standing Balance: Modified independent   Sit to Stand  Assistance Level: Modified independent  Skilled Clinical Factors: with rolling walker  Stand to Sit  Assistance Level: Modified independent  Car Transfer  Assistance Level: Supervision  Skilled Clinical Factors: with rolling walker, verbal cues for placement of walker    Environmental Mobility  Ambulation  Surface: Level surface  Device: Rolling walker  Distance: Pt ambulates 200' x 2, 20' x2 on carpet c RW  Activity: Within Unit  Assistance Level: Modified independent  Gait Deviations: Slow karla;Decreased weight shift left;Decreased step length right;Decreased heel strike left  Stairs  Stair Height: 6''  Number of Stairs: 12  Additional Factors: Non-reciprocal going up;Non-reciprocal going down  Assistance Level: modified independent  Skilled Clinical Factors: Pt performs 4 steps x 3 ascending c R leg first, descending c L foot first; non-reciprocal gait pattern performed proficiently  Curb  Curb Height:

## 2024-09-04 NOTE — PROGRESS NOTES
Patient admitted to rehab with total knee replacement.  A/Ox4. Transfers with walker x1. Mobility restrictions: WBAT. On Regular diet, tolerating well. Medications taken whole with thins. On ASA, GRAHAM hose for DVT prophylaxis.  Skin: surgical incision left knee. Oxygen: RA. LDA: PIV RFA. Has been continent of bowel and of bladder. LBM today. Chair/bed alarms in use and call light in reach. Will monitor for safety.

## 2024-09-04 NOTE — PROGRESS NOTES
0755         Time Out 0855         Minutes 60         Timed Code Treatment Minutes: 60 Minutes       Janae Noe, SHERYL/L [id number]          PM Session:    Pt met in room, reporting min pain in L knee initially, progressing with movement. Pt agreeable to OT session. Pt transferred bed > WC w/ RW mod I. Taken to gym for session.     Pt met last standing goal as he stood for over 5 min to complete simple meal prep task. Pt reached into fridge, transported bread to counter and made toast. Demo'd good safe awareness, completed Mod I.     Pt completed DRY shower transfer per home set-up. Pt required min cues for positioning of RW but demo'd good balance and use of grab bars throughout. He stepped over 4.5\" ledge, sat to shower chair with arms. He stood pushing from shower chair and stepped over ledge to RW. Overall pt supervision, states his wife will be present for shower transfers once he is home.    Pt progressed well towards goals since initial eval where he required up to Max A for ADL tasks. He achieved Mod I for ADL tasks/functional mobility. Recommend pt's wife provide supervision for shower transfer; he verbalized understanding and agreement.  Pt is safe to return home with HHOT to progress endurance/balance and PRN assist from wife.     Safety Device - Type of devices: Pt left in dept for PT session  []  All fall risk precautions in place [] Bed alarm in place  [] Call light within reach [] Chair alarm in place [] Positioning belt [] Gait belt [] Patient at risk for falls [] Left in bed [x] Left in chair [] Telesitter in use [] Sitter present [] Nurse notified []  None    Therapy Time     Individual Co-treatment   Time In 1230    Time Out 1300    Minutes 30      Electronically signed by Coni Booker OT on 9/4/2024 at 1:10 PM

## 2024-09-04 NOTE — PLAN OF CARE
Problem: Discharge Planning  Goal: Discharge to home or other facility with appropriate resources  9/4/2024 1059 by Shreya Tatum LPN  Outcome: Progressing  Flowsheets (Taken 9/4/2024 1059)  Discharge to home or other facility with appropriate resources:   Identify barriers to discharge with patient and caregiver   Identify discharge learning needs (meds, wound care, etc)     Problem: Safety - Adult  Goal: Free from fall injury  9/4/2024 1059 by Shreya Tatum LPN  Outcome: Progressing  Flowsheets (Taken 9/4/2024 1059)  Free From Fall Injury: Instruct family/caregiver on patient safety     Problem: Pain  Goal: Verbalizes/displays adequate comfort level or baseline comfort level  9/4/2024 1059 by Shreya Tatum LPN  Outcome: Progressing  Flowsheets (Taken 9/4/2024 1059)  Verbalizes/displays adequate comfort level or baseline comfort level:   Encourage patient to monitor pain and request assistance   Assess pain using appropriate pain scale     Problem: ABCDS Injury Assessment  Goal: Absence of physical injury  9/4/2024 1059 by Shreya Tatum LPN  Outcome: Progressing  Flowsheets (Taken 9/4/2024 1059)  Absence of Physical Injury: Implement safety measures based on patient assessment     Problem: Skin/Tissue Integrity  Goal: Absence of new skin breakdown  Description: 1.  Monitor for areas of redness and/or skin breakdown  2.  Assess vascular access sites hourly  3.  Every 4-6 hours minimum:  Change oxygen saturation probe site  4.  Every 4-6 hours:  If on nasal continuous positive airway pressure, respiratory therapy assess nares and determine need for appliance change or resting period.  9/4/2024 1059 by Shreya Tatum LPN  Outcome: Progressing     Problem: Nutrition Deficit:  Goal: Optimize nutritional status  9/4/2024 1059 by Shreya Tatum LPN  Outcome: Progressing  Flowsheets (Taken 9/4/2024 1059)  Nutrient intake appropriate for improving, restoring, or maintaining nutritional needs: Monitor oral intake, labs, and

## 2024-09-04 NOTE — PATIENT CARE CONFERENCE
[facility]  Inpatient Rehabilitation  Weekly Team Conference Note      Date: 2024  Patient Name:  Mark Josue    MRN: [id number]  : 1945  Gender: Male  Physician: Dr. Josue MAYERS  Diagnosis: S/P TKR (total knee replacement), left [Z96.652]    CASE MANAGEMENT  Assessment: Goal is home with wife, agreeable to home care services.       PHYSICAL THERAPY    Bed Mobility:  Overall Assistance Level: Supervision  Additional Factors: Head of bed flat, Without handrails, Increased time to complete  Sit>supine:  Assistance Level: Supervision  Supine>sit:  Assistance Level: Supervision    Transfers:  Surface: Wheelchair, To chair with arms, From chair with arms, To mat, From mat  Additional Factors: Verbal cues, Increased time to complete  Device: Walker  Sit>stand:  Assistance Level: Contact guard assist  Skilled Clinical Factors: hand placement for Stedy  Stand>sit:  Assistance Level: Contact guard assist  Bed<>chair  Technique: Stand step  Assistance Level: Contact guard assist  Skilled Clinical Factors: chair <> mat table with RW  Stand Pivot:  Assistance Level: Contact guard assist  Skilled Clinical Factors: recliner <> w/c with RW    Car transfer:  Assistance Level: Stand by assist, Contact guard assist  Skilled Clinical Factors: cues for sequence and reminders for hand placement - no LOB. Increased time for stand from lower car surface (patient reports they have an SUV)    Ambulation:  Surface: Level surface  Device: Rolling walker  Distance: 20 + 40 ft with 2 turns  Activity: Within Unit  Additional Factors: Increased time to complete  Assistance Level: Contact guard assist, Minimal assistance  Gait Deviations: Slow karla, Decreased weight shift left, Decreased step length right  Skilled Clinical Factors: slow step to gait pattern with RW, trunk flexed forward with pushing down on walker, increases karla with distance - limited by LLE pain and occasional posterior lean with up to 
commode   [] W/C: _____  [] Rolling Walker [] Standard walker [] Gait belt [] cane: _________  [] Sliding board [] Alternate seating/furniture [] O2 [] Hip Kit: RW basket, wide sock aide  [] Life Line [] Other: _______  Factors facilitating achievement of predicted outcomes: Family support, Motivated, Cooperative, Pleasant, Good insight into deficits, and Has needed Durable Medical Equipment at home  Barriers to the achievement of predicted outcomes/Interventions:        Interdisciplinary Individualized Plan of Care Review:    Continue Current Plan of Care: Yes    Modifications:_____________________________    Special Needs in the Upcoming Week :    [] Family/Caregiver Education  [] Home visit  []Therapeutic Pass   [] Consults:_______    [] Other;_______    Patient Rehab Team Goals for the Upcoming Week:  1. Ensure safe D/C home  2.   3.           Team Members Present at Conference:  Physician:Dr. Josue MAYERS  : ALEX Bocanegra  Occupational Therapist: Ernestina Leyva, OTR/L#[id number]  Physical Therapist:Bria Jackman PT   Speech Therapist:   ARU Supervisor:Magali Noe RN CRRN  Dietician: Lynn Hawkins, RD, LD   Psychologist:  Other:      I attest to leading the interdisciplinary team meeting, required attendees are present as listed above. I approve the established interdisciplinary plan of care as documented within the medical record of Mark Josue.    MD: Nuris Salas MD 9/5/2024, 3:10 PM

## 2024-09-04 NOTE — TELEPHONE ENCOUNTER
Spoke with Patient regarding post discharge from hospital. Currently at [facility].    Incision status: No drainage, odor, or redness noted    Edema/Swelling/Teds: reports edema noted to operative site and using ice   reports wearing lenore hose as prescribed    Pain level and status: 4-6/10    Use of pain medications: taking tylenol prn    Blood thinner: Aspirin 81mg ; Verified with patient that they are taking their anticoagulant as prescribed twice a day.     Bowels:  last bowel movement  today    Therapy active: active with therapy at [facility].    Do you have all of your medications: Yes    Changes in medications: not to patient's knowledge.    States he thinks he will be discharged home tomorrow but has not spoken with a  yet. Bridget notified by this RN.    No other questions/concerns at this time. Encouraged patient to call Orthopedic Nurse Navigator Grisel Kaur or Orthopedic office if has any questions/concerns.      Follow up appointments:    Future Appointments   Date Time Provider Department Center   9/5/2024 11:00 AM Rory Deng MD [facility]     Electronically signed by Grisel Kaur RN on 9/4/2024 at 3:33 PM

## 2024-09-04 NOTE — PROGRESS NOTES
Department of Physical Medicine & Rehabilitation  Progress Note    Patient Identification:  Mark Josue  [id number]  : 1945  Admit date: 2024    Chief Complaint: S/P TKR (total knee replacement), left    Subjective:   No acute events overnight.   Patient seen this afternoon sitting up in gym. He reports feeling slightly anxious about discharge but overall feeling prepared. We discussed plans for home care, medications, and follow-ups.      Labs reviewed.     ROS: No f/c, n/v, cp     Objective:  Patient Vitals for the past 24 hrs:   BP Temp Temp src Pulse Resp SpO2 Weight   24 0852 -- -- -- -- -- 97 % --   24 0745 (!) 156/80 97.4 °F (36.3 °C) Oral 72 16 96 % --   24 0645 -- -- -- -- -- -- 111.1 kg (244 lb 14.9 oz)   24 1915 (!) 154/91 97.6 °F (36.4 °C) Oral 78 16 97 % --     Const: Alert. No distress, pleasant.   HEENT: Normocephalic, atraumatic. Normal sclera/conjunctiva. MMM.   CV: Regular rate and rhythm.   Resp: No respiratory distress. Lungs CTAB.   Abd: Soft, nontender, nondistended, NABS+   Ext/MSK: Left knee post op swelling and decreased ROM, incision c/d/i  Neuro: Alert, oriented, appropriately interactive.   Psych: Cooperative, appropriate mood and affect    Laboratory data: Available via EMR.   Last 24 hour lab  No results found for this or any previous visit (from the past 24 hour(s)).        Therapy progress:  Physical therapy:  Bed Mobility:  Overall Assistance Level: Independent  Additional Factors: Head of bed flat, Without handrails, Increased time to complete  Sit>supine:  Assistance Level: Independent  Supine>sit:  Assistance Level: Independent  Skilled Clinical Factors: HOB slight elevated, needs assist to doff ice machine LLE  Transfers:  Surface: Wheelchair, To chair with arms, From chair with arms, From bed, To bed  Additional Factors: Verbal cues, Increased time to complete  Device: Walker  Sit>stand:  Assistance Level: Modified independent  Skilled

## 2024-09-05 VITALS
SYSTOLIC BLOOD PRESSURE: 151 MMHG | WEIGHT: 248.68 LBS | OXYGEN SATURATION: 98 % | RESPIRATION RATE: 18 BRPM | TEMPERATURE: 97.8 F | HEIGHT: 76 IN | BODY MASS INDEX: 30.28 KG/M2 | HEART RATE: 83 BPM | DIASTOLIC BLOOD PRESSURE: 89 MMHG

## 2024-09-05 LAB
ANION GAP SERPL CALCULATED.3IONS-SCNC: 8 MMOL/L (ref 3–16)
BASOPHILS # BLD: 0.1 K/UL (ref 0–0.2)
BASOPHILS NFR BLD: 0.8 %
BUN SERPL-MCNC: 16 MG/DL (ref 7–20)
CALCIUM SERPL-MCNC: 9.2 MG/DL (ref 8.3–10.6)
CHLORIDE SERPL-SCNC: 102 MMOL/L (ref 99–110)
CO2 SERPL-SCNC: 28 MMOL/L (ref 21–32)
CREAT SERPL-MCNC: 1 MG/DL (ref 0.8–1.3)
DEPRECATED RDW RBC AUTO: 15.1 % (ref 12.4–15.4)
EOSINOPHIL # BLD: 0.2 K/UL (ref 0–0.6)
EOSINOPHIL NFR BLD: 2.9 %
GFR SERPLBLD CREATININE-BSD FMLA CKD-EPI: 77 ML/MIN/{1.73_M2}
GLUCOSE SERPL-MCNC: 94 MG/DL (ref 70–99)
HCT VFR BLD AUTO: 40 % (ref 40.5–52.5)
HGB BLD-MCNC: 13.6 G/DL (ref 13.5–17.5)
LYMPHOCYTES # BLD: 1.5 K/UL (ref 1–5.1)
LYMPHOCYTES NFR BLD: 17.7 %
MCH RBC QN AUTO: 32.8 PG (ref 26–34)
MCHC RBC AUTO-ENTMCNC: 33.9 G/DL (ref 31–36)
MCV RBC AUTO: 96.9 FL (ref 80–100)
MONOCYTES # BLD: 0.9 K/UL (ref 0–1.3)
MONOCYTES NFR BLD: 10.4 %
NEUTROPHILS # BLD: 5.7 K/UL (ref 1.7–7.7)
NEUTROPHILS NFR BLD: 68.2 %
PLATELET # BLD AUTO: 349 K/UL (ref 135–450)
PMV BLD AUTO: 7.9 FL (ref 5–10.5)
POTASSIUM SERPL-SCNC: 4.4 MMOL/L (ref 3.5–5.1)
RBC # BLD AUTO: 4.13 M/UL (ref 4.2–5.9)
SODIUM SERPL-SCNC: 138 MMOL/L (ref 136–145)
WBC # BLD AUTO: 8.4 K/UL (ref 4–11)

## 2024-09-05 PROCEDURE — 85025 COMPLETE CBC W/AUTO DIFF WBC: CPT

## 2024-09-05 PROCEDURE — 80048 BASIC METABOLIC PNL TOTAL CA: CPT

## 2024-09-05 PROCEDURE — 94660 CPAP INITIATION&MGMT: CPT

## 2024-09-05 PROCEDURE — 36415 COLL VENOUS BLD VENIPUNCTURE: CPT

## 2024-09-05 PROCEDURE — 6370000000 HC RX 637 (ALT 250 FOR IP): Performed by: PHYSICAL MEDICINE & REHABILITATION

## 2024-09-05 PROCEDURE — 94760 N-INVAS EAR/PLS OXIMETRY 1: CPT

## 2024-09-05 ASSESSMENT — PAIN DESCRIPTION - LOCATION: LOCATION: KNEE;LEG

## 2024-09-05 ASSESSMENT — PAIN DESCRIPTION - ORIENTATION: ORIENTATION: LEFT

## 2024-09-05 ASSESSMENT — PAIN SCALES - WONG BAKER
WONGBAKER_NUMERICALRESPONSE: NO HURT
WONGBAKER_NUMERICALRESPONSE: NO HURT

## 2024-09-05 ASSESSMENT — PAIN DESCRIPTION - DESCRIPTORS: DESCRIPTORS: ACHING

## 2024-09-05 ASSESSMENT — PAIN SCALES - GENERAL
PAINLEVEL_OUTOF10: 0
PAINLEVEL_OUTOF10: 3

## 2024-09-05 ASSESSMENT — PAIN - FUNCTIONAL ASSESSMENT: PAIN_FUNCTIONAL_ASSESSMENT: PREVENTS OR INTERFERES WITH MANY ACTIVE NOT PASSIVE ACTIVITIES

## 2024-09-05 NOTE — PROGRESS NOTES
[facility] Orthopedic Surgery   Progress Note      S/P :  SUBJECTIVE  Up in recliner. Alert and oriented. Plan to go home today. Pain is   described in left knee and with the intensity of moderate. Pain is described as aching.       OBJECTIVE              Physical                      VITALS:  BP (!) 151/89   Pulse 83   Temp 97.8 °F (36.6 °C) (Oral)   Resp 18   Ht 1.93 m (6' 4\")   Wt 112.8 kg (248 lb 10.9 oz)   SpO2 98%   BMI 30.27 kg/m²                     MUSCULOSKELETAL:  left foot NVI. Wiggles toes to command. Able to plantarflex and dorsiflex ankle Pedal pulses are palpable. Left knee moderately swollen and bruised. Applied ice pad. GRAHAM hose on.                    NEUROLOGIC:                                  Sensory:  Touch:  Left Lower Extremity:  normal                                                 Surgical wound appears well approximated , Prineo is off.     Data       CBC:   Lab Results   Component Value Date/Time    WBC 8.4 09/05/2024 05:46 AM    RBC 4.13 09/05/2024 05:46 AM    HGB 13.6 09/05/2024 05:46 AM    HCT 40.0 09/05/2024 05:46 AM    MCV 96.9 09/05/2024 05:46 AM    MCH 32.8 09/05/2024 05:46 AM    MCHC 33.9 09/05/2024 05:46 AM    RDW 15.1 09/05/2024 05:46 AM     09/05/2024 05:46 AM    MPV 7.9 09/05/2024 05:46 AM        WBC:    Lab Results   Component Value Date/Time    WBC 8.4 09/05/2024 05:46 AM        Hemoglobin/Hematocrit:    Lab Results   Component Value Date/Time    HGB 13.6 09/05/2024 05:46 AM    HCT 40.0 09/05/2024 05:46 AM        PT/INR:    Lab Results   Component Value Date/Time    PROTIME 13.9 08/25/2024 04:23 PM    INR 1.05 08/25/2024 04:23 PM                       Left knee xray:     No change in appearance left in the arthroplasty component.  There is  decreased surrounding soft tissue swelling and soft tissue gas.     Remote fracture proximal tibia redemonstrated stabilized by plate and screws.  2 superior screws appear fractured, and the plate on the  tibia also

## 2024-09-05 NOTE — PLAN OF CARE
Problem: Discharge Planning  Goal: Discharge to home or other facility with appropriate resources  9/5/2024 0953 by Primitivo Watt RN  Outcome: Adequate for Discharge  9/5/2024 0952 by Primitivo Watt RN  Outcome: Progressing  Flowsheets (Taken 9/5/2024 0952)  Discharge to home or other facility with appropriate resources:   Identify discharge learning needs (meds, wound care, etc)   Identify barriers to discharge with patient and caregiver   Arrange for needed discharge resources and transportation as appropriate  9/5/2024 0123 by Jaylene Sims RN  Outcome: Progressing     Problem: Safety - Adult  Goal: Free from fall injury  9/5/2024 0953 by Primitivo Watt RN  Outcome: Adequate for Discharge  9/5/2024 0952 by Primitivo Watt RN  Outcome: Progressing  Flowsheets (Taken 9/5/2024 0952)  Free From Fall Injury: Instruct family/caregiver on patient safety  9/5/2024 0123 by Jaylene Sims RN  Outcome: Progressing     Problem: Pain  Goal: Verbalizes/displays adequate comfort level or baseline comfort level  9/5/2024 0953 by Primitivo Watt RN  Outcome: Adequate for Discharge  9/5/2024 0952 by Primitivo Watt RN  Outcome: Progressing  Flowsheets (Taken 9/5/2024 0952)  Verbalizes/displays adequate comfort level or baseline comfort level:   Encourage patient to monitor pain and request assistance   Administer analgesics based on type and severity of pain and evaluate response   Assess pain using appropriate pain scale   Implement non-pharmacological measures as appropriate and evaluate response  9/5/2024 0123 by Jaylene Sims RN  Outcome: Progressing     Problem: ABCDS Injury Assessment  Goal: Absence of physical injury  9/5/2024 0953 by Primitivo Watt RN  Outcome: Adequate for Discharge  9/5/2024 0952 by Primitivo Watt RN  Outcome: Progressing  Flowsheets (Taken 9/4/2024 1059 by Shreya Tatum LPN)  Absence of Physical Injury: Implement safety measures based on patient assessment  9/5/2024 0123 by Jaylene Sims

## 2024-09-05 NOTE — PROGRESS NOTES
1100 pt discharged home with [facility]. No prescriptions for pt to pickup at dc. Pt dc instructions reviewed with pt. All questions asked and answered. Paperwork given to pt wife. Pt saw ortho prior to DC will follow pup in 4 weeks. Pt belongings packed and with pt at dc. Pt transported to vehicle via staff assisted wc. No difficulty with transfer to vehicle.

## 2024-09-05 NOTE — CARE COORDINATION
Spoke with Van, Bedside RN about request to have Maddison B see this patient before he leaves today instead of his scheduled appt with Dr Deng at 11 am.    SOL Bocanegra     Case Management   [phone]    9/5/2024  8:35 AM

## 2024-09-05 NOTE — PLAN OF CARE
Problem: Discharge Planning  Goal: Discharge to home or other facility with appropriate resources  9/5/2024 0952 by Primitivo Watt RN  Outcome: Progressing  Flowsheets (Taken 9/5/2024 0952)  Discharge to home or other facility with appropriate resources:   Identify discharge learning needs (meds, wound care, etc)   Identify barriers to discharge with patient and caregiver   Arrange for needed discharge resources and transportation as appropriate  9/5/2024 0123 by Jaylene Sims RN  Outcome: Progressing     Problem: Safety - Adult  Goal: Free from fall injury  9/5/2024 0952 by Primitivo Watt RN  Outcome: Progressing  Flowsheets (Taken 9/5/2024 0952)  Free From Fall Injury: Instruct family/caregiver on patient safety  9/5/2024 0123 by Jaylene Sims RN  Outcome: Progressing     Problem: Pain  Goal: Verbalizes/displays adequate comfort level or baseline comfort level  9/5/2024 0952 by Primitivo Watt RN  Outcome: Progressing  Flowsheets (Taken 9/5/2024 0952)  Verbalizes/displays adequate comfort level or baseline comfort level:   Encourage patient to monitor pain and request assistance   Administer analgesics based on type and severity of pain and evaluate response   Assess pain using appropriate pain scale   Implement non-pharmacological measures as appropriate and evaluate response  9/5/2024 0123 by Jaylene Sims RN  Outcome: Progressing     Problem: ABCDS Injury Assessment  Goal: Absence of physical injury  9/5/2024 0952 by Primitivo Watt RN  Outcome: Progressing  Flowsheets (Taken 9/4/2024 1059 by Shreya Tatum LPN)  Absence of Physical Injury: Implement safety measures based on patient assessment  9/5/2024 0123 by Jaylene Sims RN  Outcome: Progressing     Problem: Skin/Tissue Integrity  Goal: Absence of new skin breakdown  Description: 1.  Monitor for areas of redness and/or skin breakdown  2.  Assess vascular access sites hourly  3.  Every 4-6 hours minimum:  Change oxygen saturation probe site  4.

## 2024-09-05 NOTE — DISCHARGE SUMMARY
basket      Discharge Condition: Stable    Follow-up:  See after visit summary from hospitalization    Discharge Medications:     Medication List        CONTINUE taking these medications      buPROPion 150 MG extended release tablet  Commonly known as: WELLBUTRIN SR     busPIRone 10 MG tablet  Commonly known as: BUSPAR     carvedilol 25 MG tablet  Commonly known as: COREG     dapagliflozin 10 MG tablet  Commonly known as: FARXIGA     fluticasone 50 MCG/ACT nasal spray  Commonly known as: FLONASE     ICOSAPENT ETHYL PO     levothyroxine 125 MCG tablet  Commonly known as: SYNTHROID     LORazepam 1 MG tablet  Commonly known as: ATIVAN     omeprazole 20 MG delayed release capsule  Commonly known as: PRILOSEC     sacubitril-valsartan 49-51 MG per tablet  Commonly known as: ENTRESTO     simvastatin 40 MG tablet  Commonly known as: ZOCOR     tadalafil 5 MG tablet  Commonly known as: CIALIS     vitamin D 25 MCG (1000 UT) Caps            STOP taking these medications      aspirin 81 MG EC tablet     oxyCODONE 5 MG immediate release tablet  Commonly known as: ROXICODONE                I spent over 35 minutes on this discharge encounter between counseling, coordination of care, and medication reconciliation.    Discharge order placed in advance to facilitate patient’s discharge needs.      Nuris Salas MD

## 2024-09-05 NOTE — CARE COORDINATION
Late Entry.  Met with patient to review DC for Thursday.  He is very hopeful he will not have to go see Surgeon on Thursday morning and is hopeful his RNC will see him before he leaves.  He is agreeable to home care and [facility].  Wife will pick him up around 10 am.  Meds to be sent to [facility].  IMM letter presented.  He denied any transportation issues.     Call also placed to wife who had left a message.  Reviewed Plan.  They both are in agreement with this plan.  SOL Bocanegra     Case Management   [phone]    9/5/2024  9:07 AM

## 2024-09-05 NOTE — FLOWSHEET NOTE
Provision of Current Reconciled Medication List to Subsequent Provider at Discharge  []No, current reconciled medication list not provided to the subsequent provider.  [x]Yes, current reconciled medication list provided to the subsequent provider. (**Select route of transmission below**)   [x] Via Electronic Health Record   []Via Health Information Exchange Organization  [] Verbal (e.g. in person, telephone, video conferencing)  []Paper-based (e.g. fax, copies, printouts)   []Other Methods (e.g. texting, email, CDs)    Provision of Current Reconciled Medication List to Patient at Discharge  []No, current reconciled medication list not provided to the patient, family and/or caregiver.   [x]Yes, current reconciled medication list provided to the patient, family and/or caregiver.  (**Select route of transmission below**)   [x] Via Electronic Health Record (e.g., electronic access to patient portal)   [] Via Health Information Exchange Organization  [] Verbal (e.g. in person, telephone, video conferencing)  []Paper-based (e.g. fax, copies, printouts)   []Other Methods (e.g. texting, email, CDs)    High Risk Drug Classes:  Use and Indication    Is taking: Check if the pt is taking any medications by pharmacological classification, not how it is used, in the following classes  Indication noted: If column 1 is checked, check if there is an indication noted for all meds in the drug class Is taking  (check all that apply) Indication noted (check all that apply)   Antipsychotic [x] [x]   Anticoagulant [x] [x]   Antibiotic [] []   Opioid [] []   Antiplatelet [] []   Hypoglycemic (including insulin) [] []   None of the above []     Special Treatments, Procedures, and Programs    Check all of the following treatments, procedures, and programs that apply at discharge. At Discharge (check all that apply)   Cancer Treatments   A1. Chemotherapy []           A2. IV []           A3. Oral []           A10. Other []   B1. Radiation []

## 2024-09-05 NOTE — CARE COORDINATION
SOCIAL WORK DISCHARGE SUMMARY        DATE OF DISCHARGE:  Thursday, 9-5-2024      LOCATION:   Home        Discharging to Facility/ Agency   Name: [facility]  Address:  [address] [place] [address] [address], [place] [address]   Phone:  [phone]  Fax:  [phone]     TIME:   10 am         PHARMACY:  [facility] [address]        [facility]   [facility]       Transportation Question  \"NO\"      IMM:9-4-2024    Referrals made:  [facility] for [facility].    SOL Bocanegra     Case Management   [phone]    9/5/2024  9:11 AM

## 2024-09-05 NOTE — PROGRESS NOTES
Patient admitted to rehab with Total L knee replacement.  A/Ox4. Transfers with walker x1. Mobility restrictions: WBAT. On regular diet, tolerating well. Medications taken whole with thins. On asa GRAHAM hose for DVT prophylaxis.  Skin: incision to L knee redness to buttocks, scattered bruising. Oxygen: RA. LDA: NONE. Has been continent of bowel and continent of bladder. LBM 9//4. Chair/bed alarms in use and call light in reach. Will monitor for safety.

## 2024-09-12 ENCOUNTER — TELEPHONE (OUTPATIENT)
Dept: ORTHOPEDIC SURGERY | Age: 79
End: 2024-09-12

## 2024-09-12 DIAGNOSIS — Z96.652 STATUS POST TOTAL LEFT KNEE REPLACEMENT: Primary | ICD-10-CM

## 2024-09-17 ENCOUNTER — TELEPHONE (OUTPATIENT)
Dept: ORTHOPEDIC SURGERY | Age: 79
End: 2024-09-17

## 2024-09-23 ENCOUNTER — HOSPITAL ENCOUNTER (OUTPATIENT)
Dept: PHYSICAL THERAPY | Age: 79
Setting detail: THERAPIES SERIES
Discharge: HOME | End: 2024-09-23
Payer: MEDICARE

## 2024-09-23 ENCOUNTER — PATIENT MESSAGE (OUTPATIENT)
Dept: ORTHOPEDIC SURGERY | Age: 79
End: 2024-09-23

## 2024-09-23 DIAGNOSIS — G89.18 ACUTE POSTOPERATIVE PAIN OF LEFT KNEE: Primary | ICD-10-CM

## 2024-09-23 DIAGNOSIS — M25.562 ACUTE POSTOPERATIVE PAIN OF LEFT KNEE: Primary | ICD-10-CM

## 2024-09-23 PROCEDURE — 97161 PT EVAL LOW COMPLEX 20 MIN: CPT

## 2024-09-23 PROCEDURE — 97112 NEUROMUSCULAR REEDUCATION: CPT

## 2024-09-23 PROCEDURE — 97530 THERAPEUTIC ACTIVITIES: CPT

## 2024-09-24 RX ORDER — AMOXICILLIN 500 MG/1
TABLET, FILM COATED ORAL
Qty: 4 TABLET | Refills: 0 | Status: SHIPPED | OUTPATIENT
Start: 2024-09-24

## 2024-09-30 ENCOUNTER — HOSPITAL ENCOUNTER (OUTPATIENT)
Dept: PHYSICAL THERAPY | Age: 79
Setting detail: THERAPIES SERIES
Discharge: HOME | End: 2024-09-30
Payer: MEDICARE

## 2024-09-30 PROCEDURE — 97110 THERAPEUTIC EXERCISES: CPT

## 2024-09-30 PROCEDURE — 97112 NEUROMUSCULAR REEDUCATION: CPT

## 2024-09-30 PROCEDURE — 97140 MANUAL THERAPY 1/> REGIONS: CPT

## 2024-09-30 NOTE — FLOWSHEET NOTE
[facility]- Outpatient Rehabilitation and Therapy [address], [place] [address] office: [phone] fax: [phone]         Physical Therapy: TREATMENT/PROGRESS NOTE   Patient: Mark Josue (79 y.o. male)   Examination Date: 2024   :  1945 MRN: [id number]   Visit #: 2   Insurance Allowable Auth Needed   11 visits thru 24 [x]Yes  via Carelon  []No    Insurance: Payor: [facility] MEDICARE / Plan: ANTHEM MEDIBLUE ESSENTIAL/PLUS / Product Type: *No Product type* /PT Insurance Information: [facility] Medicare, Prior authorization via Carelon , $35 copay, visits based on medical necessity  Insurance ID: [id number] - (Medicare Managed)  Secondary Insurance (if applicable): [employer]   Treatment Diagnosis:     ICD-10-CM    1. 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

## 2024-10-03 ENCOUNTER — OFFICE VISIT (OUTPATIENT)
Dept: ORTHOPEDIC SURGERY | Age: 79
End: 2024-10-03

## 2024-10-03 VITALS — WEIGHT: 248 LBS | BODY MASS INDEX: 30.2 KG/M2 | HEIGHT: 76 IN

## 2024-10-03 DIAGNOSIS — Z96.652 STATUS POST TOTAL LEFT KNEE REPLACEMENT: Primary | ICD-10-CM

## 2024-10-03 PROCEDURE — 99024 POSTOP FOLLOW-UP VISIT: CPT | Performed by: ORTHOPAEDIC SURGERY

## 2024-10-03 NOTE — PROGRESS NOTES
[facility] Orthopaedics and Spine  Office Visit    Chief Complaint: Follow-up s/p left total knee arthroplasty    HPI:  Mark Josue is a 79 y.o. who is here in follow-up of left total knee arthroplasty performed on August 20, 2024.  He walks with use of a cane.  He is in physical therapy.  He takes Tylenol as needed for pain.  He rates pain as 5/10.    Exam:  Appearance: sitting in exam room chair, appears to be in no acute distress, awake and alert  Resp: unlabored breathing on room air  Skin: warm, dry and intact with out erythema or significant increased temperature  Neuro: grossly intact both lower extremities. Intact sensation to light touch. Motor exam 4+ to 5/5 in all major motor groups.  Left knee: Incision is healed.  Range of motion is 5 to 110 degrees.  Sensation is intact light touch.  There is brisk capillary refill. There is 5/5 muscle strength in all muscle groups.    Imaging:  3 views of the left knee were performed and reviewed today. Significant for total knee arthroplasty prosthesis in place with no signs of osteolysis, loosening, fracture, or dislocation.    Assessment:  S/p left total knee arthroplasty    Plan:  He is recovering well postoperatively.  He will continue working with physical therapy.  Follow-up for repeat assessment and radiographs in 6 weeks.    This dictation was done with [employer] dictation and may contain mechanical errors related to translation.

## 2024-10-04 ENCOUNTER — HOSPITAL ENCOUNTER (OUTPATIENT)
Dept: PHYSICAL THERAPY | Age: 79
Setting detail: THERAPIES SERIES
End: 2024-10-04
Payer: MEDICARE

## 2024-10-08 ENCOUNTER — HOSPITAL ENCOUNTER (OUTPATIENT)
Dept: PHYSICAL THERAPY | Age: 79
Setting detail: THERAPIES SERIES
Discharge: HOME | End: 2024-10-08
Payer: MEDICARE

## 2024-10-08 PROCEDURE — 97140 MANUAL THERAPY 1/> REGIONS: CPT

## 2024-10-08 PROCEDURE — 97110 THERAPEUTIC EXERCISES: CPT

## 2024-10-08 PROCEDURE — 97112 NEUROMUSCULAR REEDUCATION: CPT

## 2024-10-08 NOTE — FLOWSHEET NOTE
[facility]- Outpatient Rehabilitation and Therapy [address], [place] [address] office: [phone] fax: [phone]         Physical Therapy: TREATMENT/PROGRESS NOTE   Patient: Mark Josue (79 y.o. male)   Examination Date: 10/08/2024   :  1945 MRN: [id number]   Visit #: 3   Insurance Allowable Auth Needed   11 visits thru 24 [x]Yes  via Carelon  []No    Insurance: Payor: [facility] MEDICARE / Plan: ANTHEM MEDIBLUE ESSENTIAL/PLUS / Product Type: *No Product type* /PT Insurance Information: [facility] Medicare, Prior authorization via Carelon , $35 copay, visits based on medical necessity  Insurance ID: [id number] - (Medicare Managed)  Secondary Insurance (if applicable): [employer]   Treatment Diagnosis:     ICD-10-CM    1. 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

## 2024-10-10 ENCOUNTER — HOSPITAL ENCOUNTER (OUTPATIENT)
Dept: PHYSICAL THERAPY | Age: 79
Setting detail: THERAPIES SERIES
Discharge: HOME | End: 2024-10-10
Payer: MEDICARE

## 2024-10-10 PROCEDURE — 97140 MANUAL THERAPY 1/> REGIONS: CPT | Performed by: PHYSICAL THERAPIST

## 2024-10-10 PROCEDURE — 97112 NEUROMUSCULAR REEDUCATION: CPT | Performed by: PHYSICAL THERAPIST

## 2024-10-10 PROCEDURE — 97110 THERAPEUTIC EXERCISES: CPT | Performed by: PHYSICAL THERAPIST

## 2024-10-14 ENCOUNTER — HOSPITAL ENCOUNTER (OUTPATIENT)
Dept: PHYSICAL THERAPY | Age: 79
Setting detail: THERAPIES SERIES
Discharge: HOME | End: 2024-10-14
Payer: MEDICARE

## 2024-10-14 PROCEDURE — 97140 MANUAL THERAPY 1/> REGIONS: CPT

## 2024-10-14 PROCEDURE — 97112 NEUROMUSCULAR REEDUCATION: CPT

## 2024-10-14 PROCEDURE — 97110 THERAPEUTIC EXERCISES: CPT

## 2024-10-14 NOTE — FLOWSHEET NOTE
[facility]- Outpatient Rehabilitation and Therapy [address], [place] [address] office: [phone] fax: [phone]         Physical Therapy: TREATMENT/PROGRESS NOTE   Patient: Mark Josue (79 y.o. male)   Examination Date: 10/14/2024   :  1945 MRN: [id number]   Visit #: 5   Insurance Allowable Auth Needed   11 visits thru 24 [x]Yes  via Carelon  []No    Insurance: Payor: [facility] MEDICARE / Plan: ANTHEM MEDIBLUE ESSENTIAL/PLUS / Product Type: *No Product type* /PT Insurance Information: [facility] Medicare, Prior authorization via Carelon , $35 copay, visits based on medical necessity  Insurance ID: [id number] - (Medicare Managed)  Secondary Insurance (if applicable): [employer]UM   Treatment Diagnosis:     ICD-10-CM    1. 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

## 2024-10-17 ENCOUNTER — HOSPITAL ENCOUNTER (OUTPATIENT)
Dept: PHYSICAL THERAPY | Age: 79
Setting detail: THERAPIES SERIES
Discharge: HOME | End: 2024-10-17
Payer: MEDICARE

## 2024-10-17 PROCEDURE — 97110 THERAPEUTIC EXERCISES: CPT

## 2024-10-17 PROCEDURE — 97112 NEUROMUSCULAR REEDUCATION: CPT

## 2024-10-17 PROCEDURE — 97140 MANUAL THERAPY 1/> REGIONS: CPT

## 2024-10-17 SDOH — HEALTH STABILITY: PHYSICAL HEALTH: ON AVERAGE, HOW MANY DAYS PER WEEK DO YOU ENGAGE IN MODERATE TO STRENUOUS EXERCISE (LIKE A BRISK WALK)?: 0 DAYS

## 2024-10-17 NOTE — FLOWSHEET NOTE
[facility]- Outpatient Rehabilitation and Therapy [address], [place] [address] office: [phone] fax: [phone]         Physical Therapy: TREATMENT/PROGRESS NOTE   Patient: Mark Josue (79 y.o. male)   Examination Date: 10/17/2024   :  1945 MRN: [id number]   Visit #: 6   Insurance Allowable Auth Needed   11 visits thru 24 [x]Yes  via Carelon  []No    Insurance: Payor: [facility] MEDICARE / Plan: ANTHEM MEDIBLUE ESSENTIAL/PLUS / Product Type: *No Product type* /PT Insurance Information: [facility] Medicare, Prior authorization via Carelon , $35 copay, visits based on medical necessity  Insurance ID: [id number] - (Medicare Managed)  Secondary Insurance (if applicable): [employer] OPTUM   Treatment Diagnosis:     ICD-10-CM    1. 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

## 2024-10-18 ENCOUNTER — OFFICE VISIT (OUTPATIENT)
Dept: ORTHOPEDIC SURGERY | Age: 79
End: 2024-10-18

## 2024-10-18 VITALS — WEIGHT: 248 LBS | HEIGHT: 76 IN | BODY MASS INDEX: 30.2 KG/M2

## 2024-10-18 DIAGNOSIS — M17.11 PRIMARY OSTEOARTHRITIS OF RIGHT KNEE: Primary | ICD-10-CM

## 2024-10-18 RX ORDER — BUPIVACAINE HYDROCHLORIDE 2.5 MG/ML
2 INJECTION, SOLUTION INFILTRATION; PERINEURAL ONCE
Status: COMPLETED | OUTPATIENT
Start: 2024-10-18 | End: 2024-10-18

## 2024-10-18 RX ORDER — TRIAMCINOLONE ACETONIDE 40 MG/ML
40 INJECTION, SUSPENSION INTRA-ARTICULAR; INTRAMUSCULAR ONCE
Status: COMPLETED | OUTPATIENT
Start: 2024-10-18 | End: 2024-10-18

## 2024-10-18 RX ADMIN — BUPIVACAINE HYDROCHLORIDE 5 MG: 2.5 INJECTION, SOLUTION INFILTRATION; PERINEURAL at 11:02

## 2024-10-18 RX ADMIN — TRIAMCINOLONE ACETONIDE 40 MG: 40 INJECTION, SUSPENSION INTRA-ARTICULAR; INTRAMUSCULAR at 11:02

## 2024-10-18 NOTE — PROGRESS NOTES
1. Caller Name: Evita Mckenna                        Call Back Number: [phone]  [facility] PCP or Specialty Provider: Yes Dr. Mathew        2.  Does patient have any active symptoms of respiratory illness (fever OR cough OR shortness of breath)? Yes, the patient reports the following respiratory symptoms: cough, max temp 99.4F, shortness of breath only with coughing episodes    Pt calling regarding rash - Pt sent My Chart message to PCP today,  Dr Mathew' MA sent  response via My Chart advising pt to call for RN Triage to determine if pt should be cleared to be put on Dr Mathew' schedule and also recommended using Virtual Visit if insurance offers that service.     Pt states had Knee Surgery on 3/5/2020, had cough prior to surgery but no fever or Shortness of Breath.     3.  Does patient have any comoribidities? None     4.  In the last 30 days, has the patient traveled outside of the country OR in a high risk area within the [place] OR have any known contact with someone who has or is suspected to have COVID-19?  No.    5. Disposition: Advised to schedule with their insurance's preferred virtual visit provider to limit potential exposure to others;   [employer]:    [facility] [phone]  or   [employer] [phone] also given self-care instructions, advised social isolation.     Pt states she has appt on 3/23/20 with Dr. Mathew, advised to call [facility] Scheduling [phone] to be forwarded if needed for RN Triage for clearance before appt if cough has not resolved.    Note routed to PCP: [facility] only.        [facility] Orthopaedics and Spine  Office Visit    Chief Complaint: Follow-up for right knee pain    HPI:  Mark Josue is a 79 y.o. who is here in follow-up for right knee pain.  He underwent left total knee arthroplasty about 2 months ago and his left knee is doing well.  He walks with a cane and occasionally walks without a.  He continues physical therapy for the left knee.  However, his right knee has now been hurting more than the left, as his left knee continues to improve.  He does have a known history of right knee osteoarthritis and last underwent a steroid injection about 5 months ago.  He reports mostly anterolateral knee pain in the right knee.  There is no recent injury.  He rates pain as up to 6/10.      Past Medical History:   Diagnosis Date    Anxiety     CAD (coronary artery disease)     Depression     Fractures Left leg    GERD (gastroesophageal reflux disease)     Hyperlipidemia     Hypertension     TAM on CPAP     Thyroid disease     Wears glasses         ROS:  Constitutional: denies fever, chills, weight loss  MSK: denies pain in other joints, muscle aches  Neurological: denies numbness, tingling, weakness    Exam:  Ht 1.93 m (6' 4\")   Wt 112.5 kg (248 lb)   BMI 30.19 kg/m²      Appearance: sitting in exam room chair, appears to be in no acute distress, awake and alert  Resp: unlabored breathing on room air  Skin: warm, dry and intact with out erythema or significant increased temperature  Neuro: grossly intact both lower extremities. Intact sensation to light touch. Motor exam 4+ to 5/5 in all major motor groups.  RLE: Examination reveals that active knee range of motion is 5 to 125 degrees.  There is varus deformity, positive crepitus, positive joint line tenderness, positive antalgic gait.  Neurologically, plantar flexion and dorsiflexion is intact. 5/5 strength.     Imagin views of the right knee were performed and interpreted today.  Significant for tricompartmental

## 2024-10-21 ENCOUNTER — HOSPITAL ENCOUNTER (OUTPATIENT)
Dept: PHYSICAL THERAPY | Age: 79
Setting detail: THERAPIES SERIES
Discharge: HOME | End: 2024-10-21
Payer: MEDICARE

## 2024-10-21 PROCEDURE — 97112 NEUROMUSCULAR REEDUCATION: CPT

## 2024-10-21 PROCEDURE — 97140 MANUAL THERAPY 1/> REGIONS: CPT

## 2024-10-21 PROCEDURE — 97110 THERAPEUTIC EXERCISES: CPT

## 2024-10-21 NOTE — FLOWSHEET NOTE
[facility]- Outpatient Rehabilitation and Therapy [address], [place] [address] office: [phone] fax: [phone]         Physical Therapy: TREATMENT/PROGRESS NOTE   Patient: Mark Josue (79 y.o. male)   Examination Date: 10/21/2024   :  1945 MRN: [id number]   Visit #: 7   Insurance Allowable Auth Needed   11 visits thru 24 [x]Yes  via Carelon  []No    Insurance: Payor: [facility] MEDICARE / Plan: ANTHEM MEDIBLUE ESSENTIAL/PLUS / Product Type: *No Product type* /PT Insurance Information: [facility] Medicare, Prior authorization via Carelon , $35 copay, visits based on medical necessity  Insurance ID: [id number] - (Medicare Managed)  Secondary Insurance (if applicable): [employer]   Treatment Diagnosis:     ICD-10-CM    1. 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

## 2024-10-23 ENCOUNTER — HOSPITAL ENCOUNTER (OUTPATIENT)
Dept: PHYSICAL THERAPY | Age: 79
Setting detail: THERAPIES SERIES
Discharge: HOME | End: 2024-10-23
Payer: MEDICARE

## 2024-10-23 PROCEDURE — 97110 THERAPEUTIC EXERCISES: CPT

## 2024-10-23 PROCEDURE — 97140 MANUAL THERAPY 1/> REGIONS: CPT

## 2024-10-23 PROCEDURE — 97112 NEUROMUSCULAR REEDUCATION: CPT

## 2024-10-28 ENCOUNTER — HOSPITAL ENCOUNTER (OUTPATIENT)
Dept: PHYSICAL THERAPY | Age: 79
Setting detail: THERAPIES SERIES
End: 2024-10-28
Payer: MEDICARE

## 2024-10-31 ENCOUNTER — HOSPITAL ENCOUNTER (OUTPATIENT)
Dept: PHYSICAL THERAPY | Age: 79
Setting detail: THERAPIES SERIES
Discharge: HOME | End: 2024-10-31
Payer: MEDICARE

## 2024-10-31 PROCEDURE — 97112 NEUROMUSCULAR REEDUCATION: CPT

## 2024-10-31 PROCEDURE — 97140 MANUAL THERAPY 1/> REGIONS: CPT

## 2024-10-31 PROCEDURE — 97110 THERAPEUTIC EXERCISES: CPT

## 2024-10-31 NOTE — FLOWSHEET NOTE
[facility]- Outpatient Rehabilitation and Therapy [address], [place] [address] office: [phone] fax: [phone]         Physical Therapy: TREATMENT/PROGRESS NOTE   Patient: Mark Josue (79 y.o. male)   Examination Date: 10/31/2024   :  1945 MRN: [id number]   Visit #: 9   Insurance Allowable Auth Needed   11 visits thru 24 [x]Yes  via Carelon  []No    Insurance: Payor: [facility] MEDICARE / Plan: ANTHEM MEDIBLUE ESSENTIAL/PLUS / Product Type: *No Product type* /PT Insurance Information: [facility] Medicare, Prior authorization via Carelon , $35 copay, visits based on medical necessity  Insurance ID: [id number] - (Medicare Managed)  Secondary Insurance (if applicable): [employer]   Treatment Diagnosis:     ICD-10-CM    1. 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

## 2024-11-04 ENCOUNTER — HOSPITAL ENCOUNTER (OUTPATIENT)
Dept: PHYSICAL THERAPY | Age: 79
Setting detail: THERAPIES SERIES
Discharge: HOME | End: 2024-11-04
Payer: MEDICARE

## 2024-11-04 PROCEDURE — 97110 THERAPEUTIC EXERCISES: CPT

## 2024-11-04 PROCEDURE — 97112 NEUROMUSCULAR REEDUCATION: CPT

## 2024-11-04 PROCEDURE — 97140 MANUAL THERAPY 1/> REGIONS: CPT

## 2024-11-04 NOTE — FLOWSHEET NOTE
Therapeutic Ex (07940)  resistance Sets/time Reps Notes/Cues/Progressions   NuStep Level 1 6 min  Seat, Ues #14   Calf Stretch  X 30 sec X 3 Incline board   Knee Flex Stretch  X 30 sec X 3 stairs                        Manual Intervention (79755)  TIME     Soft tissue mobilization L knee with focus on med and lat hamstrings, distal IT band, lat ret, incision region Mod aggressive X 15 min  reclined   Direction of energy/transverse release L knee  X 5 min  reclined   Patellar mob sup, inf, med, lat  X 1 each X 5 reclined                 NMR re-education (92362) resistance Sets/time Reps CUES NEEDED   Home Exercise Program  5 min  See below   Leg Extension 22# X 3 L/R X 10 Seat  no holes, tibia #12   Leg Curl 16# X 3 L/R X 10 Seat no holes, Tibia #12   Step Ups 6\" X 1 L X 10 B handrails - unable to perform R LE without increased pain   Step Downs 6\" X 1 L X 10 B handrails   stairclimbing 6\" X 3 X 4 Reciprocal pattern   Therapeutic Activity (92707)  Sets/time     Patient education    See below                                 Modalities:    No modalities applied this session    Education:Discussed at length anatomy and biomechanics of the knee, muscle reeducation, motor recruitment.   Home Exercise Program: Patient instructed in glut sets, quad sets, isometric hip add, SLR, heel slides, knee extension prop, calf stretch with towel, ankle pumps ; written instructions with pictures issued, patient able to demonstrate exercises.   10/14/24:   Patient instructed in sitting in chair hamstring stretch, longsitting hip IR/ER ; written instructions with pictures issued, patient able to demonstrate exercises.   11/4/24:   Patient instructed in bent leg fall out, sidelying clamshell with pillow; written instructions with pictures issued, patient able to demonstrate exercises.       ASSESSMENT   Assessment:   Mark Josue is a 79 y.o. male presenting today to Outpatient PT with signs and symptoms consistent with s/p L TKA.

## 2024-11-07 ENCOUNTER — HOSPITAL ENCOUNTER (OUTPATIENT)
Dept: PHYSICAL THERAPY | Age: 79
Setting detail: THERAPIES SERIES
Discharge: HOME | End: 2024-11-07
Payer: MEDICARE

## 2024-11-07 ENCOUNTER — APPOINTMENT (OUTPATIENT)
Dept: PHYSICAL THERAPY | Age: 79
End: 2024-11-07
Payer: MEDICARE

## 2024-11-07 PROCEDURE — 97112 NEUROMUSCULAR REEDUCATION: CPT

## 2024-11-07 PROCEDURE — 97140 MANUAL THERAPY 1/> REGIONS: CPT

## 2024-11-07 PROCEDURE — 97110 THERAPEUTIC EXERCISES: CPT

## 2024-11-07 NOTE — PLAN OF CARE
Hobbies:     Review Of Systems (ROS):  [x] Performed Review of systems (Integumentary, CardioPulmonary, Neurological) by intake and observation. Intake form has been scanned into medical record. Patient has been instructed to contact their primary care physician regarding ROS issues if not already being addressed at this time.    [x] Patient history, allergies, meds reviewed. Medical chart reviewed. See intake form.     OBJECTIVE EXAMINATION     Objective: min edema noted L knee    Tests and Measures:   DATE 9/23 9/30 10/8 10/10 10/17 10/23 10/31 11/7   L Knee ext 2 1 0 0 0 0 0 0   L Knee Flex 105 107 111 113 115 121 124 124                         10/10/24 AAROM 120  deg on EOB   Exercises/Interventions     Therapeutic Ex (81861)  resistance Sets/time Reps Notes/Cues/Progressions   NuStep Level 1 6 min  Seat, Ues #14   Calf Stretch  X 30 sec X 3 Incline board   Knee Flex Stretch  X 30 sec X 3 stairs                        Manual Intervention (18919)  TIME     Soft tissue mobilization L knee with focus on med and lat hamstrings, distal IT band, lat ret, incision region Mod aggressive X 15 min  reclined   Direction of energy/transverse release L knee  X 5 min  reclined   Patellar mob sup, inf, med, lat  X 1 each X 5 reclined                 NMR re-education (21640) resistance Sets/time Reps CUES NEEDED   Home Exercise Program  5 min  See below   Leg Extension 22# X 3 L/R X 10 Seat  no holes, tibia #12   Leg Curl 16# X 3 L/R X 10 Seat no holes, Tibia #12   Step Ups 6\" X 1 L X 10 B handrails - unable to perform R LE without increased pain   Step Downs 6\" X 1 L X 10 B handrails   stairclimbing 6\" X 3 X 4 Reciprocal pattern   Therapeutic Activity (06316)  Sets/time     Patient education    See below                                 Modalities:    No modalities applied this session    Education:Discussed at length anatomy and biomechanics of the knee, muscle reeducation, motor recruitment.   Home Exercise Program: Patient

## 2024-12-02 ENCOUNTER — TELEPHONE (OUTPATIENT)
Dept: ORTHOPEDIC SURGERY | Age: 79
End: 2024-12-02

## (undated) DEVICE — 3M™ COBAN™ NL STERILE NON-LATEX SELF-ADHERENT WRAP, 2086S, 6 IN X 5 YD (15 CM X 4,5 M), 12 ROLLS/CASE: Brand: 3M™ COBAN™

## (undated) DEVICE — SOLUTION WND IRRIGATION 450 ML 0.5 PVP-I 0.9 NACL

## (undated) DEVICE — TRANSFER SET 3": Brand: MEDLINE INDUSTRIES, INC.

## (undated) DEVICE — BASIC SINGLE BASIN 1-LF: Brand: MEDLINE INDUSTRIES, INC.

## (undated) DEVICE — ADHESIVE SKIN CLOSURE XL 42 CM 2.7 CC MESH LIQUIBAND SECUR

## (undated) DEVICE — SUTURE ABSORBABLE MONOFILAMENT 1 OS-8 36 CM 40 MM VIO PDS +

## (undated) DEVICE — ELECTRODE PT RET AD L9FT HI MOIST COND ADH HYDRGEL CORDED

## (undated) DEVICE — DRAPE,ORTHOMAX,EXTREMITY: Brand: MEDLINE

## (undated) DEVICE — TOWEL,OR,DSP,ST,BLUE,STD,4/PK,20PK/CS: Brand: MEDLINE

## (undated) DEVICE — SUTURE MONOCRYL STRATAFIX SPRL SZ 3-0 L12IN ABSRB UD FS-1 L30X30CM SXMP2B410

## (undated) DEVICE — KIT TRK KNEE PROC VIZADISC

## (undated) DEVICE — DUAL CUT SAGITTAL BLADE

## (undated) DEVICE — BANDAGE COMPR W6INXL15YD WHT BGE POLY COT WV E HK LOOP CLSR

## (undated) DEVICE — PADDING CAST W6INXL4YD COT LO LINTING WYTEX

## (undated) DEVICE — PAD,NON-ADHERENT,3X8,STERILE,LF,1/PK: Brand: MEDLINE

## (undated) DEVICE — SUTURE VICRYL + SZ 1 L18IN ABSRB UD L36MM CT-1 1/2 CIR VCP841D

## (undated) DEVICE — SOLUTION PREP PAINT POV IOD FOR SKIN MUCOUS MEM

## (undated) DEVICE — SOLUTION IV 1000ML 0.9% SOD CHL FOR IRRIG PLAS CONT

## (undated) DEVICE — GLOVE SURG SZ 8 L12IN FNGR THK79MIL GRN LTX FREE

## (undated) DEVICE — CORD RETRCT SIL - ORDER MULTIPLES OF 10 EACH

## (undated) DEVICE — PIN BNE FIX L110MM DIA32MM

## (undated) DEVICE — BOOT POS LEG DEMAYO

## (undated) DEVICE — KIT DRP FOR RIO ROBOTIC ARM ASST SYS

## (undated) DEVICE — HYPODERMIC SAFETY NEEDLE: Brand: MONOJECT

## (undated) DEVICE — SYRINGE MED 30ML STD CLR PLAS LUERLOCK TIP N CTRL DISP

## (undated) DEVICE — MERCY HEALTH WEST TURNOVER: Brand: MEDLINE INDUSTRIES, INC.

## (undated) DEVICE — GLOVE ORTHO 8   MSG9480

## (undated) DEVICE — SUTURE STRATAFIX SPRL SZ 2 0 L14IN ABSRB UD MH L36MM 1 2 CIR SXMD2B401

## (undated) DEVICE — SUTURE STRATAFIX SPRL SZ 2 0 L14IN ABSRB UD MH L36MM 1 2 CIR SXMP2B401

## (undated) DEVICE — GOWN SIRUS NONREIN XL W/TWL: Brand: MEDLINE INDUSTRIES, INC.

## (undated) DEVICE — PIN BNE FIX TEMP L140MM DIA4MM MAKO

## (undated) DEVICE — TOTAL KNEE: Brand: MEDLINE INDUSTRIES, INC.

## (undated) DEVICE — KIT INT FIX FEM TIB CKPT MAKOPLASTY